# Patient Record
Sex: MALE | Race: WHITE | NOT HISPANIC OR LATINO | URBAN - METROPOLITAN AREA
[De-identification: names, ages, dates, MRNs, and addresses within clinical notes are randomized per-mention and may not be internally consistent; named-entity substitution may affect disease eponyms.]

---

## 2017-03-08 ENCOUNTER — INPATIENT (INPATIENT)
Facility: HOSPITAL | Age: 77
LOS: 11 days | Discharge: NURSING, REHAB OR LONG-TERM CARE | End: 2017-03-20
Attending: SURGERY | Admitting: SURGERY
Payer: MEDICARE

## 2017-03-08 ENCOUNTER — APPOINTMENT (OUTPATIENT)
Dept: VASCULAR SURGERY | Facility: CLINIC | Age: 77
End: 2017-03-08

## 2017-03-08 VITALS
WEIGHT: 226 LBS | BODY MASS INDEX: 29.95 KG/M2 | HEART RATE: 80 BPM | SYSTOLIC BLOOD PRESSURE: 149 MMHG | HEIGHT: 73 IN | DIASTOLIC BLOOD PRESSURE: 72 MMHG | TEMPERATURE: 98.2 F

## 2017-03-08 VITALS
OXYGEN SATURATION: 95 % | HEART RATE: 87 BPM | RESPIRATION RATE: 18 BRPM | DIASTOLIC BLOOD PRESSURE: 64 MMHG | SYSTOLIC BLOOD PRESSURE: 146 MMHG | TEMPERATURE: 98 F

## 2017-03-08 DIAGNOSIS — I96 GANGRENE, NOT ELSEWHERE CLASSIFIED: ICD-10-CM

## 2017-03-08 LAB
ALBUMIN SERPL ELPH-MCNC: 3.9 G/DL — SIGNIFICANT CHANGE UP (ref 3.3–5)
ALP SERPL-CCNC: 81 U/L — SIGNIFICANT CHANGE UP (ref 40–120)
ALT FLD-CCNC: 22 U/L — SIGNIFICANT CHANGE UP (ref 4–41)
AST SERPL-CCNC: 21 U/L — SIGNIFICANT CHANGE UP (ref 4–40)
BASE EXCESS BLDV CALC-SCNC: 1.3 MMOL/L — SIGNIFICANT CHANGE UP
BASOPHILS # BLD AUTO: 0.02 K/UL — SIGNIFICANT CHANGE UP (ref 0–0.2)
BASOPHILS NFR BLD AUTO: 0.4 % — SIGNIFICANT CHANGE UP (ref 0–2)
BILIRUB SERPL-MCNC: 0.5 MG/DL — SIGNIFICANT CHANGE UP (ref 0.2–1.2)
BLD GP AB SCN SERPL QL: NEGATIVE — SIGNIFICANT CHANGE UP
BLOOD GAS VENOUS - CREATININE: 1.63 MG/DL — HIGH (ref 0.5–1.3)
BUN SERPL-MCNC: 26 MG/DL — HIGH (ref 7–23)
CALCIUM SERPL-MCNC: 9.6 MG/DL — SIGNIFICANT CHANGE UP (ref 8.4–10.5)
CHLORIDE BLDV-SCNC: 104 MMOL/L — SIGNIFICANT CHANGE UP (ref 96–108)
CHLORIDE SERPL-SCNC: 100 MMOL/L — SIGNIFICANT CHANGE UP (ref 98–107)
CO2 SERPL-SCNC: 25 MMOL/L — SIGNIFICANT CHANGE UP (ref 22–31)
CREAT SERPL-MCNC: 1.66 MG/DL — HIGH (ref 0.5–1.3)
EOSINOPHIL # BLD AUTO: 0.06 K/UL — SIGNIFICANT CHANGE UP (ref 0–0.5)
EOSINOPHIL NFR BLD AUTO: 1.1 % — SIGNIFICANT CHANGE UP (ref 0–6)
GAS PNL BLDV: 139 MMOL/L — SIGNIFICANT CHANGE UP (ref 136–146)
GLUCOSE BLDV-MCNC: 311 — HIGH (ref 70–99)
GLUCOSE SERPL-MCNC: 312 MG/DL — HIGH (ref 70–99)
HCO3 BLDV-SCNC: 24 MMOL/L — SIGNIFICANT CHANGE UP (ref 20–27)
HCT VFR BLD CALC: 37.1 % — LOW (ref 39–50)
HCT VFR BLDV CALC: 39 % — SIGNIFICANT CHANGE UP (ref 39–51)
HGB BLD-MCNC: 12.5 G/DL — LOW (ref 13–17)
HGB BLDV-MCNC: 12.7 G/DL — LOW (ref 13–17)
IMM GRANULOCYTES NFR BLD AUTO: 0 % — SIGNIFICANT CHANGE UP (ref 0–1.5)
INR BLD: 1.18 — SIGNIFICANT CHANGE UP (ref 0.87–1.18)
LACTATE BLDV-MCNC: 2 MMOL/L — SIGNIFICANT CHANGE UP (ref 0.5–2)
LYMPHOCYTES # BLD AUTO: 1.37 K/UL — SIGNIFICANT CHANGE UP (ref 1–3.3)
LYMPHOCYTES # BLD AUTO: 25.5 % — SIGNIFICANT CHANGE UP (ref 13–44)
MCHC RBC-ENTMCNC: 30.2 PG — SIGNIFICANT CHANGE UP (ref 27–34)
MCHC RBC-ENTMCNC: 33.7 % — SIGNIFICANT CHANGE UP (ref 32–36)
MCV RBC AUTO: 89.6 FL — SIGNIFICANT CHANGE UP (ref 80–100)
MONOCYTES # BLD AUTO: 0.4 K/UL — SIGNIFICANT CHANGE UP (ref 0–0.9)
MONOCYTES NFR BLD AUTO: 7.4 % — SIGNIFICANT CHANGE UP (ref 2–14)
NEUTROPHILS # BLD AUTO: 3.52 K/UL — SIGNIFICANT CHANGE UP (ref 1.8–7.4)
NEUTROPHILS NFR BLD AUTO: 65.6 % — SIGNIFICANT CHANGE UP (ref 43–77)
PCO2 BLDV: 47 MMHG — SIGNIFICANT CHANGE UP (ref 41–51)
PH BLDV: 7.36 PH — SIGNIFICANT CHANGE UP (ref 7.32–7.43)
PLATELET # BLD AUTO: 144 K/UL — LOW (ref 150–400)
PMV BLD: 10.7 FL — SIGNIFICANT CHANGE UP (ref 7–13)
PO2 BLDV: 32 MMHG — LOW (ref 35–40)
POTASSIUM BLDV-SCNC: 4.4 MMOL/L — SIGNIFICANT CHANGE UP (ref 3.4–4.5)
POTASSIUM SERPL-MCNC: 4.7 MMOL/L — SIGNIFICANT CHANGE UP (ref 3.5–5.3)
POTASSIUM SERPL-SCNC: 4.7 MMOL/L — SIGNIFICANT CHANGE UP (ref 3.5–5.3)
PROT SERPL-MCNC: 7.2 G/DL — SIGNIFICANT CHANGE UP (ref 6–8.3)
PROTHROM AB SERPL-ACNC: 13.5 SEC — HIGH (ref 10–13.1)
RBC # BLD: 4.14 M/UL — LOW (ref 4.2–5.8)
RBC # FLD: 13.7 % — SIGNIFICANT CHANGE UP (ref 10.3–14.5)
RH IG SCN BLD-IMP: POSITIVE — SIGNIFICANT CHANGE UP
SAO2 % BLDV: 56.5 % — LOW (ref 60–85)
SODIUM SERPL-SCNC: 140 MMOL/L — SIGNIFICANT CHANGE UP (ref 135–145)
WBC # BLD: 5.37 K/UL — SIGNIFICANT CHANGE UP (ref 3.8–10.5)
WBC # FLD AUTO: 5.37 K/UL — SIGNIFICANT CHANGE UP (ref 3.8–10.5)

## 2017-03-08 RX ORDER — INSULIN DETEMIR 100/ML (3)
30 INSULIN PEN (ML) SUBCUTANEOUS
Qty: 0 | Refills: 0 | Status: DISCONTINUED | OUTPATIENT
Start: 2017-03-08 | End: 2017-03-09

## 2017-03-08 RX ORDER — INSULIN LISPRO 100/ML
VIAL (ML) SUBCUTANEOUS
Qty: 0 | Refills: 0 | Status: DISCONTINUED | OUTPATIENT
Start: 2017-03-08 | End: 2017-03-16

## 2017-03-08 RX ORDER — DEXTROSE 50 % IN WATER 50 %
12.5 SYRINGE (ML) INTRAVENOUS ONCE
Qty: 0 | Refills: 0 | Status: DISCONTINUED | OUTPATIENT
Start: 2017-03-08 | End: 2017-03-16

## 2017-03-08 RX ORDER — HEPARIN SODIUM 5000 [USP'U]/ML
5000 INJECTION INTRAVENOUS; SUBCUTANEOUS EVERY 8 HOURS
Qty: 0 | Refills: 0 | Status: DISCONTINUED | OUTPATIENT
Start: 2017-03-08 | End: 2017-03-16

## 2017-03-08 RX ORDER — GLUCAGON INJECTION, SOLUTION 0.5 MG/.1ML
1 INJECTION, SOLUTION SUBCUTANEOUS ONCE
Qty: 0 | Refills: 0 | Status: DISCONTINUED | OUTPATIENT
Start: 2017-03-08 | End: 2017-03-16

## 2017-03-08 RX ORDER — DEXTROSE 50 % IN WATER 50 %
25 SYRINGE (ML) INTRAVENOUS ONCE
Qty: 0 | Refills: 0 | Status: DISCONTINUED | OUTPATIENT
Start: 2017-03-08 | End: 2017-03-16

## 2017-03-08 RX ORDER — VANCOMYCIN HCL 1 G
1000 VIAL (EA) INTRAVENOUS ONCE
Qty: 0 | Refills: 0 | Status: DISCONTINUED | OUTPATIENT
Start: 2017-03-08 | End: 2017-03-08

## 2017-03-08 RX ORDER — DEXTROSE 50 % IN WATER 50 %
1 SYRINGE (ML) INTRAVENOUS ONCE
Qty: 0 | Refills: 0 | Status: DISCONTINUED | OUTPATIENT
Start: 2017-03-08 | End: 2017-03-16

## 2017-03-08 RX ORDER — ASPIRIN/CALCIUM CARB/MAGNESIUM 324 MG
81 TABLET ORAL DAILY
Qty: 0 | Refills: 0 | Status: DISCONTINUED | OUTPATIENT
Start: 2017-03-08 | End: 2017-03-16

## 2017-03-08 RX ORDER — AMPICILLIN SODIUM AND SULBACTAM SODIUM 250; 125 MG/ML; MG/ML
3 INJECTION, POWDER, FOR SUSPENSION INTRAMUSCULAR; INTRAVENOUS ONCE
Qty: 0 | Refills: 0 | Status: COMPLETED | OUTPATIENT
Start: 2017-03-08 | End: 2017-03-08

## 2017-03-08 RX ORDER — PIPERACILLIN AND TAZOBACTAM 4; .5 G/20ML; G/20ML
3.38 INJECTION, POWDER, LYOPHILIZED, FOR SOLUTION INTRAVENOUS ONCE
Qty: 0 | Refills: 0 | Status: COMPLETED | OUTPATIENT
Start: 2017-03-08 | End: 2017-03-08

## 2017-03-08 RX ORDER — SODIUM CHLORIDE 9 MG/ML
1000 INJECTION INTRAMUSCULAR; INTRAVENOUS; SUBCUTANEOUS ONCE
Qty: 0 | Refills: 0 | Status: COMPLETED | OUTPATIENT
Start: 2017-03-08 | End: 2017-03-08

## 2017-03-08 RX ORDER — ACETAMINOPHEN 500 MG
650 TABLET ORAL EVERY 6 HOURS
Qty: 0 | Refills: 0 | Status: DISCONTINUED | OUTPATIENT
Start: 2017-03-08 | End: 2017-03-16

## 2017-03-08 RX ORDER — INSULIN LISPRO 100/ML
10 VIAL (ML) SUBCUTANEOUS
Qty: 0 | Refills: 0 | Status: DISCONTINUED | OUTPATIENT
Start: 2017-03-08 | End: 2017-03-08

## 2017-03-08 RX ORDER — SODIUM CHLORIDE 9 MG/ML
1000 INJECTION, SOLUTION INTRAVENOUS
Qty: 0 | Refills: 0 | Status: DISCONTINUED | OUTPATIENT
Start: 2017-03-08 | End: 2017-03-16

## 2017-03-08 RX ORDER — INSULIN LISPRO 100/ML
10 VIAL (ML) SUBCUTANEOUS
Qty: 0 | Refills: 0 | Status: DISCONTINUED | OUTPATIENT
Start: 2017-03-08 | End: 2017-03-16

## 2017-03-08 RX ADMIN — AMPICILLIN SODIUM AND SULBACTAM SODIUM 200 GRAM(S): 250; 125 INJECTION, POWDER, FOR SUSPENSION INTRAMUSCULAR; INTRAVENOUS at 19:24

## 2017-03-08 RX ADMIN — SODIUM CHLORIDE 1000 MILLILITER(S): 9 INJECTION INTRAMUSCULAR; INTRAVENOUS; SUBCUTANEOUS at 18:14

## 2017-03-08 RX ADMIN — PIPERACILLIN AND TAZOBACTAM 200 GRAM(S): 4; .5 INJECTION, POWDER, LYOPHILIZED, FOR SOLUTION INTRAVENOUS at 18:14

## 2017-03-08 NOTE — ED PROVIDER NOTE - OBJECTIVE STATEMENT
76 y/o male pmh htn, dm, PVD sent in by vascular Surgeon, Dr. Landeros for admission for gangrene to R 4th toe. Pt admits to having wound on R toe x3 weeks which has progressively worsened. Pt admits to numbness to toe. Pt was treated with PO amoxicillin. Pt denies chest pain, sob, abd pain, n/v/d, dizziness, weakness, fever or chills. denies drainage from wound.

## 2017-03-08 NOTE — ED PROVIDER NOTE - ATTENDING CONTRIBUTION TO CARE
ED Attending Dr. Cook: 78 yo male with AAA, DM, in ED with 2 weeks of worsening black discoloration to right 4th toe.  Sent to ED from podiatry due to need for likely amputation of toe.  On exam right 4th toe with dry gangrene to mid-medial aspect of digit with numbness to palpation.  No CP/SOB, N/V/D or abdominal pain.

## 2017-03-08 NOTE — ED PROVIDER NOTE - PMH
AAA (Abdominal Aortic Aneurysm)  hx of  Diabetes Mellitus    Gallstones    Pleural Effusion    Viral Meningitis

## 2017-03-08 NOTE — H&P ADULT. - ASSESSMENT
77 M former smoker with RLE claudications symptoms with R foot ulcer with necrosis and surrounding cellulitis

## 2017-03-08 NOTE — H&P ADULT. - PSH
Aortocoronary Artery Bypass Graft (ICD9 V45.81)    Cataract  bilateral IOL  History of Aortic Valve Replacement (ICD9 V43.3)    History of Appendectomy    S/P Laparoscopic Cholecystectomy  10/2009  S/P Tonsillectomy

## 2017-03-08 NOTE — H&P ADULT. - HISTORY OF PRESENT ILLNESS
77 male with a PMH of DM, HTN and AVR presents to ED for R foot ulcer. The patient has had some tinglings and numbness in his R foot for the past few weeks. He reports some symptoms of claudication in his R foot and calf. He says that his calf cramps up. At times, he also reports intermittent resting foot pain. No fever or chills. He is functional at home and is able to ambulate without a walker. He is a former smoker (200 pack years), quit 9 years ago.     PSH:  CABG x1 and Aortic Valve Repair and R VATS, Cholecystectomy

## 2017-03-08 NOTE — ED ADULT TRIAGE NOTE - CHIEF COMPLAINT QUOTE
Pt sent by PMD for IV antibiotics, states he has gangrene to right toe. Skin pink, warm to touch. Pt c/o pain to heel of right foot, intermittent. Denies fever. PMH of DM,

## 2017-03-08 NOTE — ED ADULT NURSE NOTE - PSH
Aortocoronary Artery Bypass Graft (ICD9 V45.81)    Cataract  bilateral IOL  Cataract    History of Aortic Valve Replacement (ICD9 V43.3)    History of Appendectomy    S/P Laparoscopic Cholecystectomy  10/2009  S/P Tonsillectomy

## 2017-03-08 NOTE — ED ADULT NURSE NOTE - OBJECTIVE STATEMENT
Receive pt in spot 17 alert and oriented x 3 sent by PCP with gangrene to R Large toe.  Redness, and swelling noted to area.. No drainage or odor noted.  Positive pulses.  IV placed.  Laba sent.  VS see charting Receive pt in spot 17 alert and oriented x 3 sent by PCP with gangrene to R 4 th toe.  Redness, and swelling noted to area.. No drainage or odor noted.  Positive pulses.  IV placed.  Laba sent.  VS see charting

## 2017-03-09 LAB
APTT BLD: 28.7 SEC — SIGNIFICANT CHANGE UP (ref 27.5–37.4)
BUN SERPL-MCNC: 23 MG/DL — SIGNIFICANT CHANGE UP (ref 7–23)
CALCIUM SERPL-MCNC: 9.6 MG/DL — SIGNIFICANT CHANGE UP (ref 8.4–10.5)
CHLORIDE SERPL-SCNC: 108 MMOL/L — HIGH (ref 98–107)
CO2 SERPL-SCNC: 24 MMOL/L — SIGNIFICANT CHANGE UP (ref 22–31)
CREAT SERPL-MCNC: 1.59 MG/DL — HIGH (ref 0.5–1.3)
GLUCOSE SERPL-MCNC: 289 MG/DL — HIGH (ref 70–99)
HBA1C BLD-MCNC: 11 % — HIGH (ref 4–5.6)
HCT VFR BLD CALC: 36.1 % — LOW (ref 39–50)
HGB BLD-MCNC: 12.4 G/DL — LOW (ref 13–17)
INR BLD: 1.2 — HIGH (ref 0.87–1.18)
MCHC RBC-ENTMCNC: 30.9 PG — SIGNIFICANT CHANGE UP (ref 27–34)
MCHC RBC-ENTMCNC: 34.3 % — SIGNIFICANT CHANGE UP (ref 32–36)
MCV RBC AUTO: 90 FL — SIGNIFICANT CHANGE UP (ref 80–100)
PLATELET # BLD AUTO: 140 K/UL — LOW (ref 150–400)
PMV BLD: 10.7 FL — SIGNIFICANT CHANGE UP (ref 7–13)
POTASSIUM SERPL-MCNC: 5.2 MMOL/L — SIGNIFICANT CHANGE UP (ref 3.5–5.3)
POTASSIUM SERPL-SCNC: 5.2 MMOL/L — SIGNIFICANT CHANGE UP (ref 3.5–5.3)
PROTHROM AB SERPL-ACNC: 13.7 SEC — HIGH (ref 10–13.1)
RBC # BLD: 4.01 M/UL — LOW (ref 4.2–5.8)
RBC # FLD: 13.6 % — SIGNIFICANT CHANGE UP (ref 10.3–14.5)
SODIUM SERPL-SCNC: 145 MMOL/L — SIGNIFICANT CHANGE UP (ref 135–145)
SPECIMEN SOURCE: SIGNIFICANT CHANGE UP
SPECIMEN SOURCE: SIGNIFICANT CHANGE UP
WBC # BLD: 5.63 K/UL — SIGNIFICANT CHANGE UP (ref 3.8–10.5)
WBC # FLD AUTO: 5.63 K/UL — SIGNIFICANT CHANGE UP (ref 3.8–10.5)

## 2017-03-09 PROCEDURE — 93306 TTE W/DOPPLER COMPLETE: CPT | Mod: 26

## 2017-03-09 PROCEDURE — 93010 ELECTROCARDIOGRAM REPORT: CPT

## 2017-03-09 PROCEDURE — 99232 SBSQ HOSP IP/OBS MODERATE 35: CPT

## 2017-03-09 PROCEDURE — 71010: CPT | Mod: 26

## 2017-03-09 RX ORDER — ACETYLCYSTEINE 200 MG/ML
1200 VIAL (ML) MISCELLANEOUS
Qty: 0 | Refills: 0 | Status: COMPLETED | OUTPATIENT
Start: 2017-03-09 | End: 2017-03-11

## 2017-03-09 RX ORDER — ASCORBIC ACID 60 MG
500 TABLET,CHEWABLE ORAL DAILY
Qty: 0 | Refills: 0 | Status: DISCONTINUED | OUTPATIENT
Start: 2017-03-09 | End: 2017-03-16

## 2017-03-09 RX ORDER — SODIUM CHLORIDE 9 MG/ML
1000 INJECTION, SOLUTION INTRAVENOUS
Qty: 0 | Refills: 0 | Status: DISCONTINUED | OUTPATIENT
Start: 2017-03-09 | End: 2017-03-09

## 2017-03-09 RX ORDER — AMPICILLIN SODIUM AND SULBACTAM SODIUM 250; 125 MG/ML; MG/ML
1.5 INJECTION, POWDER, FOR SUSPENSION INTRAMUSCULAR; INTRAVENOUS EVERY 6 HOURS
Qty: 0 | Refills: 0 | Status: DISCONTINUED | OUTPATIENT
Start: 2017-03-09 | End: 2017-03-16

## 2017-03-09 RX ORDER — ATORVASTATIN CALCIUM 80 MG/1
40 TABLET, FILM COATED ORAL AT BEDTIME
Qty: 0 | Refills: 0 | Status: COMPLETED | OUTPATIENT
Start: 2017-03-09 | End: 2017-03-09

## 2017-03-09 RX ORDER — SODIUM CHLORIDE 9 MG/ML
1000 INJECTION, SOLUTION INTRAVENOUS
Qty: 0 | Refills: 0 | Status: DISCONTINUED | OUTPATIENT
Start: 2017-03-10 | End: 2017-03-10

## 2017-03-09 RX ORDER — MAGNESIUM SULFATE 500 MG/ML
2 VIAL (ML) INJECTION ONCE
Qty: 0 | Refills: 0 | Status: COMPLETED | OUTPATIENT
Start: 2017-03-09 | End: 2017-03-09

## 2017-03-09 RX ORDER — INSULIN DETEMIR 100/ML (3)
15 INSULIN PEN (ML) SUBCUTANEOUS
Qty: 0 | Refills: 0 | Status: DISCONTINUED | OUTPATIENT
Start: 2017-03-09 | End: 2017-03-16

## 2017-03-09 RX ADMIN — Medication 10 UNIT(S): at 13:03

## 2017-03-09 RX ADMIN — HEPARIN SODIUM 5000 UNIT(S): 5000 INJECTION INTRAVENOUS; SUBCUTANEOUS at 13:03

## 2017-03-09 RX ADMIN — Medication 50 GRAM(S): at 19:55

## 2017-03-09 RX ADMIN — AMPICILLIN SODIUM AND SULBACTAM SODIUM 100 GRAM(S): 250; 125 INJECTION, POWDER, FOR SUSPENSION INTRAMUSCULAR; INTRAVENOUS at 13:03

## 2017-03-09 RX ADMIN — HEPARIN SODIUM 5000 UNIT(S): 5000 INJECTION INTRAVENOUS; SUBCUTANEOUS at 22:22

## 2017-03-09 RX ADMIN — Medication 10 UNIT(S): at 17:16

## 2017-03-09 RX ADMIN — Medication 500 MILLIGRAM(S): at 19:48

## 2017-03-09 RX ADMIN — Medication 1200 MILLIGRAM(S): at 19:47

## 2017-03-09 RX ADMIN — HEPARIN SODIUM 5000 UNIT(S): 5000 INJECTION INTRAVENOUS; SUBCUTANEOUS at 05:13

## 2017-03-09 RX ADMIN — Medication 15 UNIT(S): at 19:55

## 2017-03-09 RX ADMIN — Medication 4: at 07:49

## 2017-03-09 RX ADMIN — Medication 4: at 13:04

## 2017-03-09 RX ADMIN — AMPICILLIN SODIUM AND SULBACTAM SODIUM 100 GRAM(S): 250; 125 INJECTION, POWDER, FOR SUSPENSION INTRAMUSCULAR; INTRAVENOUS at 17:15

## 2017-03-09 RX ADMIN — Medication 30 UNIT(S): at 07:51

## 2017-03-09 RX ADMIN — ATORVASTATIN CALCIUM 40 MILLIGRAM(S): 80 TABLET, FILM COATED ORAL at 22:39

## 2017-03-09 RX ADMIN — Medication 10 UNIT(S): at 07:49

## 2017-03-09 RX ADMIN — AMPICILLIN SODIUM AND SULBACTAM SODIUM 100 GRAM(S): 250; 125 INJECTION, POWDER, FOR SUSPENSION INTRAMUSCULAR; INTRAVENOUS at 06:36

## 2017-03-09 RX ADMIN — Medication 2: at 17:16

## 2017-03-09 RX ADMIN — Medication 81 MILLIGRAM(S): at 13:02

## 2017-03-10 LAB
BLD GP AB SCN SERPL QL: NEGATIVE — SIGNIFICANT CHANGE UP
BUN SERPL-MCNC: 18 MG/DL — SIGNIFICANT CHANGE UP (ref 7–23)
CALCIUM SERPL-MCNC: 9.2 MG/DL — SIGNIFICANT CHANGE UP (ref 8.4–10.5)
CHLORIDE SERPL-SCNC: 104 MMOL/L — SIGNIFICANT CHANGE UP (ref 98–107)
CO2 SERPL-SCNC: 20 MMOL/L — LOW (ref 22–31)
CREAT SERPL-MCNC: 1.37 MG/DL — HIGH (ref 0.5–1.3)
GLUCOSE SERPL-MCNC: 93 MG/DL — SIGNIFICANT CHANGE UP (ref 70–99)
MAGNESIUM SERPL-MCNC: 2.2 MG/DL — SIGNIFICANT CHANGE UP (ref 1.6–2.6)
PHOSPHATE SERPL-MCNC: 3.1 MG/DL — SIGNIFICANT CHANGE UP (ref 2.5–4.5)
POTASSIUM SERPL-MCNC: 4.4 MMOL/L — SIGNIFICANT CHANGE UP (ref 3.5–5.3)
POTASSIUM SERPL-SCNC: 4.4 MMOL/L — SIGNIFICANT CHANGE UP (ref 3.5–5.3)
RH IG SCN BLD-IMP: POSITIVE — SIGNIFICANT CHANGE UP
SODIUM SERPL-SCNC: 143 MMOL/L — SIGNIFICANT CHANGE UP (ref 135–145)

## 2017-03-10 PROCEDURE — 75625 CONTRAST EXAM ABDOMINL AORTA: CPT | Mod: 26

## 2017-03-10 PROCEDURE — 78452 HT MUSCLE IMAGE SPECT MULT: CPT | Mod: 26

## 2017-03-10 PROCEDURE — 36245 INS CATH ABD/L-EXT ART 1ST: CPT

## 2017-03-10 PROCEDURE — 93018 CV STRESS TEST I&R ONLY: CPT | Mod: GC

## 2017-03-10 PROCEDURE — 73660 X-RAY EXAM OF TOE(S): CPT | Mod: 26,RT

## 2017-03-10 PROCEDURE — 75710 ARTERY X-RAYS ARM/LEG: CPT | Mod: 26

## 2017-03-10 PROCEDURE — 93016 CV STRESS TEST SUPVJ ONLY: CPT | Mod: GC

## 2017-03-10 PROCEDURE — 93971 EXTREMITY STUDY: CPT | Mod: 26

## 2017-03-10 RX ORDER — SODIUM CHLORIDE 9 MG/ML
500 INJECTION, SOLUTION INTRAVENOUS
Qty: 0 | Refills: 0 | Status: DISCONTINUED | OUTPATIENT
Start: 2017-03-10 | End: 2017-03-14

## 2017-03-10 RX ORDER — ASCORBIC ACID 60 MG
500 TABLET,CHEWABLE ORAL DAILY
Qty: 0 | Refills: 0 | Status: DISCONTINUED | OUTPATIENT
Start: 2017-03-10 | End: 2017-03-10

## 2017-03-10 RX ORDER — ACETYLCYSTEINE 200 MG/ML
1200 VIAL (ML) MISCELLANEOUS EVERY 12 HOURS
Qty: 0 | Refills: 0 | Status: COMPLETED | OUTPATIENT
Start: 2017-03-12 | End: 2017-03-13

## 2017-03-10 RX ADMIN — Medication 1200 MILLIGRAM(S): at 05:44

## 2017-03-10 RX ADMIN — Medication 15 UNIT(S): at 19:50

## 2017-03-10 RX ADMIN — AMPICILLIN SODIUM AND SULBACTAM SODIUM 100 GRAM(S): 250; 125 INJECTION, POWDER, FOR SUSPENSION INTRAMUSCULAR; INTRAVENOUS at 00:16

## 2017-03-10 RX ADMIN — Medication 1200 MILLIGRAM(S): at 17:52

## 2017-03-10 RX ADMIN — HEPARIN SODIUM 5000 UNIT(S): 5000 INJECTION INTRAVENOUS; SUBCUTANEOUS at 05:43

## 2017-03-10 RX ADMIN — HEPARIN SODIUM 5000 UNIT(S): 5000 INJECTION INTRAVENOUS; SUBCUTANEOUS at 21:40

## 2017-03-10 RX ADMIN — HEPARIN SODIUM 5000 UNIT(S): 5000 INJECTION INTRAVENOUS; SUBCUTANEOUS at 15:02

## 2017-03-10 RX ADMIN — SODIUM CHLORIDE 75 MILLILITER(S): 9 INJECTION, SOLUTION INTRAVENOUS at 00:16

## 2017-03-10 RX ADMIN — AMPICILLIN SODIUM AND SULBACTAM SODIUM 100 GRAM(S): 250; 125 INJECTION, POWDER, FOR SUSPENSION INTRAMUSCULAR; INTRAVENOUS at 17:11

## 2017-03-10 RX ADMIN — Medication 10 UNIT(S): at 17:09

## 2017-03-10 RX ADMIN — AMPICILLIN SODIUM AND SULBACTAM SODIUM 100 GRAM(S): 250; 125 INJECTION, POWDER, FOR SUSPENSION INTRAMUSCULAR; INTRAVENOUS at 05:42

## 2017-03-11 LAB
APTT BLD: 32.2 SEC — SIGNIFICANT CHANGE UP (ref 27.5–37.4)
BUN SERPL-MCNC: 20 MG/DL — SIGNIFICANT CHANGE UP (ref 7–23)
CALCIUM SERPL-MCNC: 9.2 MG/DL — SIGNIFICANT CHANGE UP (ref 8.4–10.5)
CHLORIDE SERPL-SCNC: 104 MMOL/L — SIGNIFICANT CHANGE UP (ref 98–107)
CO2 SERPL-SCNC: 25 MMOL/L — SIGNIFICANT CHANGE UP (ref 22–31)
CREAT SERPL-MCNC: 1.55 MG/DL — HIGH (ref 0.5–1.3)
GLUCOSE SERPL-MCNC: 136 MG/DL — HIGH (ref 70–99)
HCT VFR BLD CALC: 36.1 % — LOW (ref 39–50)
HGB BLD-MCNC: 12.2 G/DL — LOW (ref 13–17)
INR BLD: 1.18 — SIGNIFICANT CHANGE UP (ref 0.87–1.18)
MAGNESIUM SERPL-MCNC: 2.1 MG/DL — SIGNIFICANT CHANGE UP (ref 1.6–2.6)
MCHC RBC-ENTMCNC: 30.4 PG — SIGNIFICANT CHANGE UP (ref 27–34)
MCHC RBC-ENTMCNC: 33.8 % — SIGNIFICANT CHANGE UP (ref 32–36)
MCV RBC AUTO: 90 FL — SIGNIFICANT CHANGE UP (ref 80–100)
PHOSPHATE SERPL-MCNC: 2.7 MG/DL — SIGNIFICANT CHANGE UP (ref 2.5–4.5)
PLATELET # BLD AUTO: 145 K/UL — LOW (ref 150–400)
PMV BLD: 10.7 FL — SIGNIFICANT CHANGE UP (ref 7–13)
POTASSIUM SERPL-MCNC: 4.6 MMOL/L — SIGNIFICANT CHANGE UP (ref 3.5–5.3)
POTASSIUM SERPL-SCNC: 4.6 MMOL/L — SIGNIFICANT CHANGE UP (ref 3.5–5.3)
PROTHROM AB SERPL-ACNC: 13.5 SEC — HIGH (ref 10–13.1)
RBC # BLD: 4.01 M/UL — LOW (ref 4.2–5.8)
RBC # FLD: 13.8 % — SIGNIFICANT CHANGE UP (ref 10.3–14.5)
SODIUM SERPL-SCNC: 143 MMOL/L — SIGNIFICANT CHANGE UP (ref 135–145)
WBC # BLD: 5.13 K/UL — SIGNIFICANT CHANGE UP (ref 3.8–10.5)
WBC # FLD AUTO: 5.13 K/UL — SIGNIFICANT CHANGE UP (ref 3.8–10.5)

## 2017-03-11 RX ORDER — SODIUM CHLORIDE 9 MG/ML
1000 INJECTION INTRAMUSCULAR; INTRAVENOUS; SUBCUTANEOUS
Qty: 0 | Refills: 0 | Status: DISCONTINUED | OUTPATIENT
Start: 2017-03-11 | End: 2017-03-12

## 2017-03-11 RX ADMIN — HEPARIN SODIUM 5000 UNIT(S): 5000 INJECTION INTRAVENOUS; SUBCUTANEOUS at 15:05

## 2017-03-11 RX ADMIN — Medication 81 MILLIGRAM(S): at 12:08

## 2017-03-11 RX ADMIN — AMPICILLIN SODIUM AND SULBACTAM SODIUM 100 GRAM(S): 250; 125 INJECTION, POWDER, FOR SUSPENSION INTRAMUSCULAR; INTRAVENOUS at 18:20

## 2017-03-11 RX ADMIN — SODIUM CHLORIDE 50 MILLILITER(S): 9 INJECTION INTRAMUSCULAR; INTRAVENOUS; SUBCUTANEOUS at 21:33

## 2017-03-11 RX ADMIN — AMPICILLIN SODIUM AND SULBACTAM SODIUM 100 GRAM(S): 250; 125 INJECTION, POWDER, FOR SUSPENSION INTRAMUSCULAR; INTRAVENOUS at 00:01

## 2017-03-11 RX ADMIN — HEPARIN SODIUM 5000 UNIT(S): 5000 INJECTION INTRAVENOUS; SUBCUTANEOUS at 21:33

## 2017-03-11 RX ADMIN — Medication 15 UNIT(S): at 20:29

## 2017-03-11 RX ADMIN — SODIUM CHLORIDE 50 MILLILITER(S): 9 INJECTION INTRAMUSCULAR; INTRAVENOUS; SUBCUTANEOUS at 18:20

## 2017-03-11 RX ADMIN — Medication 10 UNIT(S): at 16:54

## 2017-03-11 RX ADMIN — AMPICILLIN SODIUM AND SULBACTAM SODIUM 100 GRAM(S): 250; 125 INJECTION, POWDER, FOR SUSPENSION INTRAMUSCULAR; INTRAVENOUS at 12:05

## 2017-03-11 RX ADMIN — Medication 15 UNIT(S): at 08:00

## 2017-03-11 RX ADMIN — Medication 1200 MILLIGRAM(S): at 06:32

## 2017-03-11 RX ADMIN — Medication 10 UNIT(S): at 12:06

## 2017-03-11 RX ADMIN — Medication 500 MILLIGRAM(S): at 12:07

## 2017-03-11 RX ADMIN — AMPICILLIN SODIUM AND SULBACTAM SODIUM 100 GRAM(S): 250; 125 INJECTION, POWDER, FOR SUSPENSION INTRAMUSCULAR; INTRAVENOUS at 06:31

## 2017-03-11 RX ADMIN — HEPARIN SODIUM 5000 UNIT(S): 5000 INJECTION INTRAVENOUS; SUBCUTANEOUS at 06:32

## 2017-03-11 RX ADMIN — Medication 10 UNIT(S): at 08:00

## 2017-03-11 NOTE — PROVIDER CONTACT NOTE (OTHER) - ACTION/TREATMENT ORDERED:
Ordered to give Mag 2 gm as ordered for afib.
She came to see pt. Ordered to recheck bp in an hour, will medicate if pt continues to be elevated. Will continue to monitor.
Provider aware. Will come to bedside and change dressing. Will continue to monitor.

## 2017-03-11 NOTE — PROVIDER CONTACT NOTE (OTHER) - ASSESSMENT
No c/o pain. Pt is asymptomatic.
pt stable
Pt in no discomfort and no distress at this time. Tube feeding continued.

## 2017-03-12 LAB
BUN SERPL-MCNC: 22 MG/DL — SIGNIFICANT CHANGE UP (ref 7–23)
CALCIUM SERPL-MCNC: 9.1 MG/DL — SIGNIFICANT CHANGE UP (ref 8.4–10.5)
CHLORIDE SERPL-SCNC: 106 MMOL/L — SIGNIFICANT CHANGE UP (ref 98–107)
CO2 SERPL-SCNC: 24 MMOL/L — SIGNIFICANT CHANGE UP (ref 22–31)
CREAT SERPL-MCNC: 1.55 MG/DL — HIGH (ref 0.5–1.3)
GLUCOSE SERPL-MCNC: 105 MG/DL — HIGH (ref 70–99)
POTASSIUM SERPL-MCNC: 3.9 MMOL/L — SIGNIFICANT CHANGE UP (ref 3.5–5.3)
POTASSIUM SERPL-SCNC: 3.9 MMOL/L — SIGNIFICANT CHANGE UP (ref 3.5–5.3)
SODIUM SERPL-SCNC: 145 MMOL/L — SIGNIFICANT CHANGE UP (ref 135–145)

## 2017-03-12 RX ADMIN — Medication 1200 MILLIGRAM(S): at 09:57

## 2017-03-12 RX ADMIN — AMPICILLIN SODIUM AND SULBACTAM SODIUM 100 GRAM(S): 250; 125 INJECTION, POWDER, FOR SUSPENSION INTRAMUSCULAR; INTRAVENOUS at 00:26

## 2017-03-12 RX ADMIN — AMPICILLIN SODIUM AND SULBACTAM SODIUM 100 GRAM(S): 250; 125 INJECTION, POWDER, FOR SUSPENSION INTRAMUSCULAR; INTRAVENOUS at 23:56

## 2017-03-12 RX ADMIN — Medication 1200 MILLIGRAM(S): at 22:37

## 2017-03-12 RX ADMIN — Medication 15 UNIT(S): at 19:53

## 2017-03-12 RX ADMIN — HEPARIN SODIUM 5000 UNIT(S): 5000 INJECTION INTRAVENOUS; SUBCUTANEOUS at 22:37

## 2017-03-12 RX ADMIN — Medication 10 UNIT(S): at 11:54

## 2017-03-12 RX ADMIN — HEPARIN SODIUM 5000 UNIT(S): 5000 INJECTION INTRAVENOUS; SUBCUTANEOUS at 13:15

## 2017-03-12 RX ADMIN — Medication 81 MILLIGRAM(S): at 11:54

## 2017-03-12 RX ADMIN — Medication 15 UNIT(S): at 07:58

## 2017-03-12 RX ADMIN — Medication 10 UNIT(S): at 17:06

## 2017-03-12 RX ADMIN — Medication 500 MILLIGRAM(S): at 11:54

## 2017-03-12 RX ADMIN — AMPICILLIN SODIUM AND SULBACTAM SODIUM 100 GRAM(S): 250; 125 INJECTION, POWDER, FOR SUSPENSION INTRAMUSCULAR; INTRAVENOUS at 17:06

## 2017-03-12 RX ADMIN — HEPARIN SODIUM 5000 UNIT(S): 5000 INJECTION INTRAVENOUS; SUBCUTANEOUS at 05:48

## 2017-03-12 RX ADMIN — AMPICILLIN SODIUM AND SULBACTAM SODIUM 100 GRAM(S): 250; 125 INJECTION, POWDER, FOR SUSPENSION INTRAMUSCULAR; INTRAVENOUS at 11:54

## 2017-03-12 RX ADMIN — Medication 10 UNIT(S): at 07:57

## 2017-03-12 RX ADMIN — AMPICILLIN SODIUM AND SULBACTAM SODIUM 100 GRAM(S): 250; 125 INJECTION, POWDER, FOR SUSPENSION INTRAMUSCULAR; INTRAVENOUS at 05:48

## 2017-03-13 ENCOUNTER — TRANSCRIPTION ENCOUNTER (OUTPATIENT)
Age: 77
End: 2017-03-13

## 2017-03-13 LAB
BACTERIA BLD CULT: SIGNIFICANT CHANGE UP
BACTERIA BLD CULT: SIGNIFICANT CHANGE UP
BUN SERPL-MCNC: 20 MG/DL — SIGNIFICANT CHANGE UP (ref 7–23)
BUN SERPL-MCNC: 20 MG/DL — SIGNIFICANT CHANGE UP (ref 7–23)
CALCIUM SERPL-MCNC: 9.7 MG/DL — SIGNIFICANT CHANGE UP (ref 8.4–10.5)
CALCIUM SERPL-MCNC: 9.7 MG/DL — SIGNIFICANT CHANGE UP (ref 8.4–10.5)
CHLORIDE SERPL-SCNC: 104 MMOL/L — SIGNIFICANT CHANGE UP (ref 98–107)
CHLORIDE SERPL-SCNC: 104 MMOL/L — SIGNIFICANT CHANGE UP (ref 98–107)
CO2 SERPL-SCNC: 24 MMOL/L — SIGNIFICANT CHANGE UP (ref 22–31)
CO2 SERPL-SCNC: 24 MMOL/L — SIGNIFICANT CHANGE UP (ref 22–31)
CREAT SERPL-MCNC: 1.5 MG/DL — HIGH (ref 0.5–1.3)
CREAT SERPL-MCNC: 1.5 MG/DL — HIGH (ref 0.5–1.3)
GLUCOSE SERPL-MCNC: 147 MG/DL — HIGH (ref 70–99)
GLUCOSE SERPL-MCNC: 147 MG/DL — HIGH (ref 70–99)
MAGNESIUM SERPL-MCNC: 1.8 MG/DL — SIGNIFICANT CHANGE UP (ref 1.6–2.6)
PHOSPHATE SERPL-MCNC: 3.2 MG/DL — SIGNIFICANT CHANGE UP (ref 2.5–4.5)
POTASSIUM SERPL-MCNC: 4.1 MMOL/L — SIGNIFICANT CHANGE UP (ref 3.5–5.3)
POTASSIUM SERPL-MCNC: 4.1 MMOL/L — SIGNIFICANT CHANGE UP (ref 3.5–5.3)
POTASSIUM SERPL-SCNC: 4.1 MMOL/L — SIGNIFICANT CHANGE UP (ref 3.5–5.3)
POTASSIUM SERPL-SCNC: 4.1 MMOL/L — SIGNIFICANT CHANGE UP (ref 3.5–5.3)
SODIUM SERPL-SCNC: 141 MMOL/L — SIGNIFICANT CHANGE UP (ref 135–145)
SODIUM SERPL-SCNC: 141 MMOL/L — SIGNIFICANT CHANGE UP (ref 135–145)

## 2017-03-13 PROCEDURE — 99232 SBSQ HOSP IP/OBS MODERATE 35: CPT

## 2017-03-13 PROCEDURE — 93010 ELECTROCARDIOGRAM REPORT: CPT

## 2017-03-13 RX ADMIN — Medication 15 UNIT(S): at 07:57

## 2017-03-13 RX ADMIN — Medication 81 MILLIGRAM(S): at 12:02

## 2017-03-13 RX ADMIN — Medication 500 MILLIGRAM(S): at 12:02

## 2017-03-13 RX ADMIN — Medication 10 UNIT(S): at 17:21

## 2017-03-13 RX ADMIN — Medication 10 UNIT(S): at 12:03

## 2017-03-13 RX ADMIN — Medication 1200 MILLIGRAM(S): at 21:14

## 2017-03-13 RX ADMIN — AMPICILLIN SODIUM AND SULBACTAM SODIUM 100 GRAM(S): 250; 125 INJECTION, POWDER, FOR SUSPENSION INTRAMUSCULAR; INTRAVENOUS at 06:09

## 2017-03-13 RX ADMIN — Medication 1200 MILLIGRAM(S): at 09:50

## 2017-03-13 RX ADMIN — HEPARIN SODIUM 5000 UNIT(S): 5000 INJECTION INTRAVENOUS; SUBCUTANEOUS at 21:13

## 2017-03-13 RX ADMIN — AMPICILLIN SODIUM AND SULBACTAM SODIUM 100 GRAM(S): 250; 125 INJECTION, POWDER, FOR SUSPENSION INTRAMUSCULAR; INTRAVENOUS at 17:22

## 2017-03-13 RX ADMIN — AMPICILLIN SODIUM AND SULBACTAM SODIUM 100 GRAM(S): 250; 125 INJECTION, POWDER, FOR SUSPENSION INTRAMUSCULAR; INTRAVENOUS at 12:02

## 2017-03-13 RX ADMIN — HEPARIN SODIUM 5000 UNIT(S): 5000 INJECTION INTRAVENOUS; SUBCUTANEOUS at 06:06

## 2017-03-13 RX ADMIN — Medication 2: at 12:03

## 2017-03-13 RX ADMIN — Medication 15 UNIT(S): at 19:40

## 2017-03-13 RX ADMIN — HEPARIN SODIUM 5000 UNIT(S): 5000 INJECTION INTRAVENOUS; SUBCUTANEOUS at 15:00

## 2017-03-13 RX ADMIN — Medication 10 UNIT(S): at 07:54

## 2017-03-13 NOTE — DISCHARGE NOTE ADULT - FINDINGS/TREATMENT
You have undergone a Right lower extremity angiogram on 3/10 and a right femoral-popliteal bypass, Rgith 4th toe amputation on 3/16.

## 2017-03-13 NOTE — DISCHARGE NOTE ADULT - COMMUNITY RESOURCES
[facility] [address] [place] [address] [phone]. . [facility] [place] [phone]

## 2017-03-13 NOTE — DISCHARGE NOTE ADULT - MEDICATION SUMMARY - MEDICATIONS TO TAKE
I will START or STAY ON the medications listed below when I get home from the hospital:    acetaminophen 325 mg oral tablet  -- 2 tab(s) by mouth every 6 hours, As needed, Mild Pain (1 - 3)  -- Indication: For mild pain    oxyCODONE 5 mg oral tablet  -- 1 tab(s) by mouth every 6 hours, As needed, Moderate Pain (4 - 6) to severe pain MDD:4  -- Indication: For moderate to severe pain    Eliquis 5 mg oral tablet  -- 1 tab(s) by mouth 2 times a day  -- Check with your doctor before becoming pregnant.  It is very important that you take or use this exactly as directed.  Do not skip doses or discontinue unless directed by your doctor.  Obtain medical advice before taking any non-prescription drugs as some may affect the action of this medication.    -- Indication: For Antiplatelet post bypass    insulin lispro 100 units/mL subcutaneous solution  -- 10 unit(s) subcutaneous 3 times a day (before meals)  -- Indication: For diabetes    insulin detemir 100 units/mL subcutaneous solution  -- 15 unit(s) subcutaneous 2 times a day  -- Indication: For diabetes    atorvastatin 40 mg oral tablet  -- 1 tab(s) by mouth once a day (at bedtime)  -- Indication: For HLD    Metoprolol Tartrate 25 mg oral tablet  -- 0.5 tab(s) by mouth 2 times a day  -- It is very important that you take or use this exactly as directed.  Do not skip doses or discontinue unless directed by your doctor.  May cause drowsiness.  Alcohol may intensify this effect.  Use care when operating dangerous machinery.  Some non-prescription drugs may aggravate your condition.  Read all labels carefully.  If a warning appears, check with your doctor before taking.  Take with food or milk.  This drug may impair the ability to drive or operate machinery.  Use care until you become familiar with its effects.    -- Indication: For paroxysmal a fib/ HTN    docusate sodium 100 mg oral capsule  -- 1 cap(s) by mouth once a day  -- Indication: For constipation    cilostazol 50 mg oral tablet  -- 1 tab(s) by mouth 2 times a day.  -- Indication: For post bypass antiplatelet    amoxicillin-clavulanate 250 mg-125 mg oral tablet  -- 1 milligram(s) by mouth 2 times a day  -- Finish all this medication unless otherwise directed by prescriber.  Take with food or milk.    -- Indication: For antibiotic    ascorbic acid 500 mg oral tablet  -- 1 tab(s) by mouth once a day  -- Indication: For vitamin I will START or STAY ON the medications listed below when I get home from the hospital:    acetaminophen 325 mg oral tablet  -- 2 tab(s) by mouth every 6 hours, As needed, Mild Pain (1 - 3)  -- Indication: For mild pain    oxyCODONE 5 mg oral tablet  -- 1 tab(s) by mouth every 6 hours, As needed, Moderate Pain (4 - 6) to severe pain  -- Indication: For moderate to severe pain    Eliquis 5 mg oral tablet  -- 1 tab(s) by mouth 2 times a day  -- Indication: For anticoagulation after bypass    insulin lispro 100 units/mL subcutaneous solution  -- 10 unit(s) subcutaneous 3 times a day (before meals)  -- Indication: For diabetes    insulin detemir 100 units/mL subcutaneous solution  -- 15 unit(s) subcutaneous 2 times a day  -- Indication: For diabetes    atorvastatin 40 mg oral tablet  -- 1 tab(s) by mouth once a day (at bedtime)  -- Indication: For hLD    metoprolol  -- 12.5 milligram(s) by mouth 2 times a day  -- Indication: For  paroxysmal a fib/ htn    docusate sodium 100 mg oral capsule  -- 1 cap(s) by mouth once a day  -- Indication: For constipation    cilostazol 50 mg oral tablet  -- 1 tab(s) by mouth 2 times a day  -- Indication: For a fib    Augmentin 250 mg-125 mg oral tablet  -- 1 tab(s) by mouth 2 times a day through 3/25   -- Indication: For antibiotic    ascorbic acid 500 mg oral tablet  -- 1 tab(s) by mouth once a day  -- Indication: For vitamin

## 2017-03-13 NOTE — DISCHARGE NOTE ADULT - HOSPITAL COURSE
77 male with a PMH of DM, HTN and AVR presents to ED for R foot ulcer. The patient has had some tinglings and numbness in his R foot for the past few weeks. He reports some symptoms of claudication in his R foot and calf. He says that his calf cramps up. At times, he also reports intermittent resting foot pain. No fever or chills. He is functional at home and is able to ambulate without a walker. He is a former smoker (200 pack years), quit 9 years ago.     PSH:  CABG x1 and Aortic Valve Repair and R VATS, Cholecystectomy     Pt was admitted to [facility] vascular surgery service.  Pt was started on IV antibiotics for his right 4th toe ulcer and underwent vascular studies. Pt then underwent a Right lower extremity angiogram on 3/10 which revealed multiple stenotic vessels.  Pt was then seen and evaluated by cardiology as pt was recommended to undergo a RLE bypass. He had a TTE and a nuclear stress test which showed    TTE: CONCLUSIONS:  1. Mitral annular calcification, otherwise normal mitral  valve. Minimal mitral regurgitation.  2. Bioprosthetic aortic valve replacement, which was poorly  visualized. Peak transaortic valve gradient equals 31 mm  Hg, mean transaortic valve gradient equals 15 mm Hg, which  is probably normal in the presence of a prosthetic valve.  3. Endocardium not well visualized; grossly normal left  ventricular systolic function.  Endocardial visualization  enhanced with intravenous injection of echo contrast  (Definity).  4. Unable to accurately evaluate right ventricular size or  systolic function.  Stress test: IMPRESSIONS:Abnormal Study  * Myocardial Perfusion SPECT results are abnormal.  * There are small, mild defects in the apex and  anteroseptal wall that are partially reversible suggestive  of infarct with mild jinny-infarct ischemia. The are small,  mild defects in the inferior and inferoseptal walls that  are fixed, suggestive of infarct.  * Post-stress gated wall motion analysis was performed  (LVEF = 55 %;LVEDV = 115 ml.), revealing normal overall LV  function. Paradoxical septal motion is seen consistent  with post-CABG state.      After cardiac workup, pt underwent RLE Fem-Pop PTFE bypass with profundaplasty with R 4th toe amputation on 3/16.  Pt was noted to have EKG changes jinny-operatively, (an episode of rapid A fib controlled with metoprolol and concern for ST changes) and was transferred to a tele monitoring floor post procedure .  As per the cardiologist as pt has known BBB and a history of paroxysmal A Fib.   Troponins were negative x 2 there was no intervention required immediately.  However pt's 3rd troponin was noted to be elevated and  requested pt to start a betablocker, statin and continue ASA.   Pt remained in rate controlled A fib and was recommended to start therapeutic anticoagulation.  Pt refused coumadin but was in agreement to start Eliquis upon discharge.  Pt did not have any further episodes of A fib with RVR and recovered without complication.  His jaquez was removed, pt was voiding and tolerating a regular diet.  Pain was well controlled with oral pain meds.  PT evaluated pt and pt will require home PT upon discharge.  At this time he is stable for discharge with follow up in the office.

## 2017-03-13 NOTE — DISCHARGE NOTE ADULT - CARE PLAN
Principal Discharge DX:	Gangrene  Goal:	Pain control after your bypass  Instructions for follow-up, activity and diet:	-Please leave your staples intact and follow up with  within 1 week of discharge.    Ok to shower and rinse wound with warm soapy water.  Do not scrub wound, pat dry.  Please cover your right 4th toe ulcer with dry gauze daily (OK to wrap with Kerlix or tape to keep intact).  Keep dressing dry. Please call the doctor immed iately if you develop fever, chills, inability to tolerate liquid or food, diarrhea, nausea, vomiting or increased pain at surgical incisions, numbness, tingling and/or redness, bleeding or purulent drainage. Do not drive or operate machinery while taking narcotic pain medication. Please follow up with your surgeon and primary care doctor within 1 week of discharge. Please follow up with cardiology within 1 week of discharge -  as you have been started on Eliquis, Metoprolol and Lipitor for discharge. Principal Discharge DX:	Gangrene  Goal:	Pain control after your bypass  Instructions for follow-up, activity and diet:	-Please leave your staples intact and follow up with  within 1 week of discharge.    Ok to shower and rinse wound with warm soapy water.  Do not scrub wound, pat dry.  Please cover your right 4th toe ulcer with dry gauze daily (OK to wrap with Kerlix or tape to keep intact).  Keep dressing dry. Please call the doctor immed iately if you develop fever, chills, inability to tolerate liquid or food, diarrhea, nausea, vomiting or increased pain at surgical incisions, numbness, tingling and/or redness, bleeding or purulent drainage. Do not drive or operate machinery while taking narcotic pain medication. Please follow up with your surgeon and primary care doctor within 1 week of discharge.  You will be taking pletal and eliquis post operatively/  Secondary Diagnosis:	Atrial fibrillation  Goal:	Follow up with the cardiologist  Instructions for follow-up, activity and diet:	Follow up with Dr. Calvert within 1-2 weeks of discharge.  Continue metoprolol, lipitor and eliquis (the new anticoagulation) you have been started on along with pletal (post bypass antiplatelet).

## 2017-03-13 NOTE — DISCHARGE NOTE ADULT - PATIENT PORTAL LINK FT
“You can access the Follow[facility] Patient Portal, offered by [facility], by registering with the following website: [email or web address]”

## 2017-03-13 NOTE — DISCHARGE NOTE ADULT - CARE PROVIDER_API CALL
Isaias Landeros), Vascular Surgery  [address] [place] [address]  Phone: [phone]  Fax: [phone]    Yan Calvert (MD), Cardiovascular Disease; Internal Medicine  [address] [id number]  Phone: [phone]  Fax: [phone]

## 2017-03-13 NOTE — DISCHARGE NOTE ADULT - CARE PROVIDERS DIRECT ADDRESSES
[email or web address]

## 2017-03-13 NOTE — DISCHARGE NOTE ADULT - INSTRUCTIONS
Resume your usual diabetic diet Resume your usual diabetic diet, Do not take aspirin as you will be starting Eliquis

## 2017-03-13 NOTE — DISCHARGE NOTE ADULT - CONDITIONS AT DISCHARGE
Pt remained hemodynamically stable.  Pt denies any pain or shortness of breath. Right groin/right thigh/ right calf/right foot staples and dressings dry and intact. Pt being discharged to rehab.

## 2017-03-13 NOTE — DISCHARGE NOTE ADULT - PLAN OF CARE
Pain control after your bypass -Please leave your staples intact and follow up with  within 1 week of discharge.    Ok to shower and rinse wound with warm soapy water.  Do not scrub wound, pat dry.  Please cover your right 4th toe ulcer with dry gauze daily (OK to wrap with Kerlix or tape to keep intact).  Keep dressing dry. Please call the doctor immed iately if you develop fever, chills, inability to tolerate liquid or food, diarrhea, nausea, vomiting or increased pain at surgical incisions, numbness, tingling and/or redness, bleeding or purulent drainage. Do not drive or operate machinery while taking narcotic pain medication. Please follow up with your surgeon and primary care doctor within 1 week of discharge. Please follow up with cardiology within 1 week of discharge -  as you have been started on Eliquis, Metoprolol and Lipitor for discharge. Follow up with the cardiologist Follow up with Dr. Calvert within 1-2 weeks of discharge.  Continue metoprolol, lipitor and eliquis (the new anticoagulation) you have been started on along with pletal (post bypass antiplatelet). -Please leave your staples intact and follow up with  within 1 week of discharge.    Ok to shower and rinse wound with warm soapy water.  Do not scrub wound, pat dry.  Please cover your right 4th toe ulcer with dry gauze daily (OK to wrap with Kerlix or tape to keep intact).  Keep dressing dry. Please call the doctor immed iately if you develop fever, chills, inability to tolerate liquid or food, diarrhea, nausea, vomiting or increased pain at surgical incisions, numbness, tingling and/or redness, bleeding or purulent drainage. Do not drive or operate machinery while taking narcotic pain medication. Please follow up with your surgeon and primary care doctor within 1 week of discharge.  You will be taking pletal and eliquis post operatively/

## 2017-03-14 LAB
BUN SERPL-MCNC: 18 MG/DL — SIGNIFICANT CHANGE UP (ref 7–23)
CALCIUM SERPL-MCNC: 9.3 MG/DL — SIGNIFICANT CHANGE UP (ref 8.4–10.5)
CHLORIDE SERPL-SCNC: 104 MMOL/L — SIGNIFICANT CHANGE UP (ref 98–107)
CO2 SERPL-SCNC: 24 MMOL/L — SIGNIFICANT CHANGE UP (ref 22–31)
CREAT SERPL-MCNC: 1.39 MG/DL — HIGH (ref 0.5–1.3)
GLUCOSE SERPL-MCNC: 102 MG/DL — HIGH (ref 70–99)
HCT VFR BLD CALC: 36.4 % — LOW (ref 39–50)
HGB BLD-MCNC: 12.2 G/DL — LOW (ref 13–17)
MAGNESIUM SERPL-MCNC: 1.8 MG/DL — SIGNIFICANT CHANGE UP (ref 1.6–2.6)
MCHC RBC-ENTMCNC: 30.4 PG — SIGNIFICANT CHANGE UP (ref 27–34)
MCHC RBC-ENTMCNC: 33.5 % — SIGNIFICANT CHANGE UP (ref 32–36)
MCV RBC AUTO: 90.8 FL — SIGNIFICANT CHANGE UP (ref 80–100)
PHOSPHATE SERPL-MCNC: 3 MG/DL — SIGNIFICANT CHANGE UP (ref 2.5–4.5)
PLATELET # BLD AUTO: 134 K/UL — LOW (ref 150–400)
PMV BLD: 10.4 FL — SIGNIFICANT CHANGE UP (ref 7–13)
POTASSIUM SERPL-MCNC: 3.9 MMOL/L — SIGNIFICANT CHANGE UP (ref 3.5–5.3)
POTASSIUM SERPL-SCNC: 3.9 MMOL/L — SIGNIFICANT CHANGE UP (ref 3.5–5.3)
RBC # BLD: 4.01 M/UL — LOW (ref 4.2–5.8)
RBC # FLD: 13.9 % — SIGNIFICANT CHANGE UP (ref 10.3–14.5)
SODIUM SERPL-SCNC: 142 MMOL/L — SIGNIFICANT CHANGE UP (ref 135–145)
WBC # BLD: 5.2 K/UL — SIGNIFICANT CHANGE UP (ref 3.8–10.5)
WBC # FLD AUTO: 5.2 K/UL — SIGNIFICANT CHANGE UP (ref 3.8–10.5)

## 2017-03-14 PROCEDURE — 93010 ELECTROCARDIOGRAM REPORT: CPT

## 2017-03-14 PROCEDURE — 99232 SBSQ HOSP IP/OBS MODERATE 35: CPT

## 2017-03-14 RX ORDER — POTASSIUM CHLORIDE 20 MEQ
20 PACKET (EA) ORAL ONCE
Qty: 0 | Refills: 0 | Status: COMPLETED | OUTPATIENT
Start: 2017-03-14 | End: 2017-03-14

## 2017-03-14 RX ORDER — DOCUSATE SODIUM 100 MG
100 CAPSULE ORAL DAILY
Qty: 0 | Refills: 0 | Status: DISCONTINUED | OUTPATIENT
Start: 2017-03-14 | End: 2017-03-16

## 2017-03-14 RX ORDER — MAGNESIUM OXIDE 400 MG ORAL TABLET 241.3 MG
400 TABLET ORAL ONCE
Qty: 0 | Refills: 0 | Status: COMPLETED | OUTPATIENT
Start: 2017-03-14 | End: 2017-03-14

## 2017-03-14 RX ADMIN — Medication 10 UNIT(S): at 11:28

## 2017-03-14 RX ADMIN — HEPARIN SODIUM 5000 UNIT(S): 5000 INJECTION INTRAVENOUS; SUBCUTANEOUS at 06:14

## 2017-03-14 RX ADMIN — AMPICILLIN SODIUM AND SULBACTAM SODIUM 100 GRAM(S): 250; 125 INJECTION, POWDER, FOR SUSPENSION INTRAMUSCULAR; INTRAVENOUS at 16:47

## 2017-03-14 RX ADMIN — Medication 20 MILLIEQUIVALENT(S): at 11:22

## 2017-03-14 RX ADMIN — AMPICILLIN SODIUM AND SULBACTAM SODIUM 100 GRAM(S): 250; 125 INJECTION, POWDER, FOR SUSPENSION INTRAMUSCULAR; INTRAVENOUS at 01:38

## 2017-03-14 RX ADMIN — Medication 500 MILLIGRAM(S): at 11:22

## 2017-03-14 RX ADMIN — MAGNESIUM OXIDE 400 MG ORAL TABLET 400 MILLIGRAM(S): 241.3 TABLET ORAL at 11:22

## 2017-03-14 RX ADMIN — Medication 15 UNIT(S): at 20:12

## 2017-03-14 RX ADMIN — Medication 15 UNIT(S): at 07:53

## 2017-03-14 RX ADMIN — Medication 81 MILLIGRAM(S): at 11:22

## 2017-03-14 RX ADMIN — HEPARIN SODIUM 5000 UNIT(S): 5000 INJECTION INTRAVENOUS; SUBCUTANEOUS at 13:47

## 2017-03-14 RX ADMIN — AMPICILLIN SODIUM AND SULBACTAM SODIUM 100 GRAM(S): 250; 125 INJECTION, POWDER, FOR SUSPENSION INTRAMUSCULAR; INTRAVENOUS at 10:30

## 2017-03-14 RX ADMIN — AMPICILLIN SODIUM AND SULBACTAM SODIUM 100 GRAM(S): 250; 125 INJECTION, POWDER, FOR SUSPENSION INTRAMUSCULAR; INTRAVENOUS at 22:32

## 2017-03-14 RX ADMIN — Medication 2: at 16:46

## 2017-03-14 RX ADMIN — Medication 10 UNIT(S): at 07:53

## 2017-03-14 RX ADMIN — Medication 10 UNIT(S): at 16:47

## 2017-03-14 RX ADMIN — HEPARIN SODIUM 5000 UNIT(S): 5000 INJECTION INTRAVENOUS; SUBCUTANEOUS at 22:32

## 2017-03-15 ENCOUNTER — RESULT REVIEW (OUTPATIENT)
Age: 77
End: 2017-03-15

## 2017-03-15 LAB
APTT BLD: 33.6 SEC — SIGNIFICANT CHANGE UP (ref 27.5–37.4)
BLD GP AB SCN SERPL QL: NEGATIVE — SIGNIFICANT CHANGE UP
BUN SERPL-MCNC: 20 MG/DL — SIGNIFICANT CHANGE UP (ref 7–23)
CALCIUM SERPL-MCNC: 9.2 MG/DL — SIGNIFICANT CHANGE UP (ref 8.4–10.5)
CHLORIDE SERPL-SCNC: 105 MMOL/L — SIGNIFICANT CHANGE UP (ref 98–107)
CO2 SERPL-SCNC: 22 MMOL/L — SIGNIFICANT CHANGE UP (ref 22–31)
CREAT SERPL-MCNC: 1.47 MG/DL — HIGH (ref 0.5–1.3)
GLUCOSE SERPL-MCNC: 127 MG/DL — HIGH (ref 70–99)
HCT VFR BLD CALC: 36.6 % — LOW (ref 39–50)
HGB BLD-MCNC: 12 G/DL — LOW (ref 13–17)
INR BLD: 1.11 — SIGNIFICANT CHANGE UP (ref 0.87–1.18)
MAGNESIUM SERPL-MCNC: 1.8 MG/DL — SIGNIFICANT CHANGE UP (ref 1.6–2.6)
MCHC RBC-ENTMCNC: 29.6 PG — SIGNIFICANT CHANGE UP (ref 27–34)
MCHC RBC-ENTMCNC: 32.8 % — SIGNIFICANT CHANGE UP (ref 32–36)
MCV RBC AUTO: 90.1 FL — SIGNIFICANT CHANGE UP (ref 80–100)
PHOSPHATE SERPL-MCNC: 3 MG/DL — SIGNIFICANT CHANGE UP (ref 2.5–4.5)
PLATELET # BLD AUTO: 150 K/UL — SIGNIFICANT CHANGE UP (ref 150–400)
PMV BLD: 10.7 FL — SIGNIFICANT CHANGE UP (ref 7–13)
POTASSIUM SERPL-MCNC: 4.2 MMOL/L — SIGNIFICANT CHANGE UP (ref 3.5–5.3)
POTASSIUM SERPL-SCNC: 4.2 MMOL/L — SIGNIFICANT CHANGE UP (ref 3.5–5.3)
PROTHROM AB SERPL-ACNC: 12.7 SEC — SIGNIFICANT CHANGE UP (ref 10–13.1)
RBC # BLD: 4.06 M/UL — LOW (ref 4.2–5.8)
RBC # FLD: 13.9 % — SIGNIFICANT CHANGE UP (ref 10.3–14.5)
RH IG SCN BLD-IMP: POSITIVE — SIGNIFICANT CHANGE UP
SODIUM SERPL-SCNC: 142 MMOL/L — SIGNIFICANT CHANGE UP (ref 135–145)
WBC # BLD: 5.44 K/UL — SIGNIFICANT CHANGE UP (ref 3.8–10.5)
WBC # FLD AUTO: 5.44 K/UL — SIGNIFICANT CHANGE UP (ref 3.8–10.5)

## 2017-03-15 PROCEDURE — 71010: CPT | Mod: 26

## 2017-03-15 PROCEDURE — 99232 SBSQ HOSP IP/OBS MODERATE 35: CPT

## 2017-03-15 RX ORDER — MAGNESIUM SULFATE 500 MG/ML
1 VIAL (ML) INJECTION ONCE
Qty: 0 | Refills: 0 | Status: COMPLETED | OUTPATIENT
Start: 2017-03-15 | End: 2017-03-15

## 2017-03-15 RX ORDER — DEXTROSE MONOHYDRATE, SODIUM CHLORIDE, AND POTASSIUM CHLORIDE 50; .745; 4.5 G/1000ML; G/1000ML; G/1000ML
1000 INJECTION, SOLUTION INTRAVENOUS
Qty: 0 | Refills: 0 | Status: DISCONTINUED | OUTPATIENT
Start: 2017-03-15 | End: 2017-03-16

## 2017-03-15 RX ADMIN — Medication 100 MILLIGRAM(S): at 12:07

## 2017-03-15 RX ADMIN — AMPICILLIN SODIUM AND SULBACTAM SODIUM 100 GRAM(S): 250; 125 INJECTION, POWDER, FOR SUSPENSION INTRAMUSCULAR; INTRAVENOUS at 12:06

## 2017-03-15 RX ADMIN — Medication 81 MILLIGRAM(S): at 12:07

## 2017-03-15 RX ADMIN — Medication 10 UNIT(S): at 08:07

## 2017-03-15 RX ADMIN — Medication 10 UNIT(S): at 12:08

## 2017-03-15 RX ADMIN — HEPARIN SODIUM 5000 UNIT(S): 5000 INJECTION INTRAVENOUS; SUBCUTANEOUS at 05:24

## 2017-03-15 RX ADMIN — Medication 10 UNIT(S): at 17:06

## 2017-03-15 RX ADMIN — Medication 100 GRAM(S): at 10:36

## 2017-03-15 RX ADMIN — Medication 500 MILLIGRAM(S): at 12:07

## 2017-03-15 RX ADMIN — Medication 15 UNIT(S): at 08:06

## 2017-03-15 RX ADMIN — Medication 15 UNIT(S): at 19:16

## 2017-03-15 RX ADMIN — AMPICILLIN SODIUM AND SULBACTAM SODIUM 100 GRAM(S): 250; 125 INJECTION, POWDER, FOR SUSPENSION INTRAMUSCULAR; INTRAVENOUS at 17:10

## 2017-03-15 RX ADMIN — AMPICILLIN SODIUM AND SULBACTAM SODIUM 100 GRAM(S): 250; 125 INJECTION, POWDER, FOR SUSPENSION INTRAMUSCULAR; INTRAVENOUS at 05:23

## 2017-03-15 RX ADMIN — HEPARIN SODIUM 5000 UNIT(S): 5000 INJECTION INTRAVENOUS; SUBCUTANEOUS at 21:29

## 2017-03-15 RX ADMIN — HEPARIN SODIUM 5000 UNIT(S): 5000 INJECTION INTRAVENOUS; SUBCUTANEOUS at 13:22

## 2017-03-16 LAB
APTT BLD: 23.7 SEC — LOW (ref 27.5–37.4)
APTT BLD: 28.8 SEC — SIGNIFICANT CHANGE UP (ref 27.5–37.4)
BASE EXCESS BLDA CALC-SCNC: -1.1 MMOL/L — SIGNIFICANT CHANGE UP
BASE EXCESS BLDA CALC-SCNC: 0.9 MMOL/L — SIGNIFICANT CHANGE UP
BUN SERPL-MCNC: 18 MG/DL — SIGNIFICANT CHANGE UP (ref 7–23)
BUN SERPL-MCNC: 22 MG/DL — SIGNIFICANT CHANGE UP (ref 7–23)
CA-I BLDA-SCNC: 1.13 MMOL/L — LOW (ref 1.15–1.29)
CA-I BLDA-SCNC: 1.22 MMOL/L — SIGNIFICANT CHANGE UP (ref 1.15–1.29)
CALCIUM SERPL-MCNC: 8.7 MG/DL — SIGNIFICANT CHANGE UP (ref 8.4–10.5)
CALCIUM SERPL-MCNC: 9.4 MG/DL — SIGNIFICANT CHANGE UP (ref 8.4–10.5)
CHLORIDE SERPL-SCNC: 106 MMOL/L — SIGNIFICANT CHANGE UP (ref 98–107)
CHLORIDE SERPL-SCNC: 107 MMOL/L — SIGNIFICANT CHANGE UP (ref 98–107)
CK MB BLD-MCNC: 1.09 NG/ML — SIGNIFICANT CHANGE UP (ref 1–6.6)
CK SERPL-CCNC: 39 U/L — SIGNIFICANT CHANGE UP (ref 30–200)
CO2 SERPL-SCNC: 22 MMOL/L — SIGNIFICANT CHANGE UP (ref 22–31)
CO2 SERPL-SCNC: 24 MMOL/L — SIGNIFICANT CHANGE UP (ref 22–31)
CREAT SERPL-MCNC: 1.34 MG/DL — HIGH (ref 0.5–1.3)
CREAT SERPL-MCNC: 1.47 MG/DL — HIGH (ref 0.5–1.3)
GLUCOSE BLDA-MCNC: 159 MG/DL — HIGH (ref 70–99)
GLUCOSE BLDA-MCNC: 188 MG/DL — HIGH (ref 70–99)
GLUCOSE SERPL-MCNC: 193 MG/DL — HIGH (ref 70–99)
GLUCOSE SERPL-MCNC: 224 MG/DL — HIGH (ref 70–99)
HCO3 BLDA-SCNC: 24 MMOL/L — SIGNIFICANT CHANGE UP (ref 22–26)
HCO3 BLDA-SCNC: 25 MMOL/L — SIGNIFICANT CHANGE UP (ref 22–26)
HCT VFR BLD CALC: 29.9 % — LOW (ref 39–50)
HCT VFR BLD CALC: 34.8 % — LOW (ref 39–50)
HCT VFR BLDA CALC: 29 % — LOW (ref 39–51)
HCT VFR BLDA CALC: 37.2 % — LOW (ref 39–51)
HGB BLD-MCNC: 11.5 G/DL — LOW (ref 13–17)
HGB BLD-MCNC: 9.9 G/DL — LOW (ref 13–17)
HGB BLDA-MCNC: 12.1 G/DL — LOW (ref 13–17)
HGB BLDA-MCNC: 9.4 G/DL — LOW (ref 13–17)
INR BLD: 1.12 — SIGNIFICANT CHANGE UP (ref 0.87–1.18)
MAGNESIUM SERPL-MCNC: 2 MG/DL — SIGNIFICANT CHANGE UP (ref 1.6–2.6)
MCHC RBC-ENTMCNC: 29.7 PG — SIGNIFICANT CHANGE UP (ref 27–34)
MCHC RBC-ENTMCNC: 29.9 PG — SIGNIFICANT CHANGE UP (ref 27–34)
MCHC RBC-ENTMCNC: 33 % — SIGNIFICANT CHANGE UP (ref 32–36)
MCHC RBC-ENTMCNC: 33.1 % — SIGNIFICANT CHANGE UP (ref 32–36)
MCV RBC AUTO: 89.9 FL — SIGNIFICANT CHANGE UP (ref 80–100)
MCV RBC AUTO: 90.3 FL — SIGNIFICANT CHANGE UP (ref 80–100)
PCO2 BLDA: 38 MMHG — SIGNIFICANT CHANGE UP (ref 35–48)
PCO2 BLDA: 40 MMHG — SIGNIFICANT CHANGE UP (ref 35–48)
PH BLDA: 7.4 PH — SIGNIFICANT CHANGE UP (ref 7.35–7.45)
PH BLDA: 7.41 PH — SIGNIFICANT CHANGE UP (ref 7.35–7.45)
PHOSPHATE SERPL-MCNC: 2.7 MG/DL — SIGNIFICANT CHANGE UP (ref 2.5–4.5)
PLATELET # BLD AUTO: 128 K/UL — LOW (ref 150–400)
PLATELET # BLD AUTO: 150 K/UL — SIGNIFICANT CHANGE UP (ref 150–400)
PMV BLD: 10.2 FL — SIGNIFICANT CHANGE UP (ref 7–13)
PMV BLD: 10.6 FL — SIGNIFICANT CHANGE UP (ref 7–13)
PO2 BLDA: 103 MMHG — SIGNIFICANT CHANGE UP (ref 83–108)
PO2 BLDA: 416 MMHG — HIGH (ref 83–108)
POTASSIUM BLDA-SCNC: 3.9 MMOL/L — SIGNIFICANT CHANGE UP (ref 3.4–4.5)
POTASSIUM BLDA-SCNC: 4.4 MMOL/L — SIGNIFICANT CHANGE UP (ref 3.4–4.5)
POTASSIUM SERPL-MCNC: 4.2 MMOL/L — SIGNIFICANT CHANGE UP (ref 3.5–5.3)
POTASSIUM SERPL-MCNC: 4.3 MMOL/L — SIGNIFICANT CHANGE UP (ref 3.5–5.3)
POTASSIUM SERPL-SCNC: 4.2 MMOL/L — SIGNIFICANT CHANGE UP (ref 3.5–5.3)
POTASSIUM SERPL-SCNC: 4.3 MMOL/L — SIGNIFICANT CHANGE UP (ref 3.5–5.3)
PROTHROM AB SERPL-ACNC: 12.8 SEC — SIGNIFICANT CHANGE UP (ref 10–13.1)
RBC # BLD: 3.31 M/UL — LOW (ref 4.2–5.8)
RBC # BLD: 3.87 M/UL — LOW (ref 4.2–5.8)
RBC # FLD: 14 % — SIGNIFICANT CHANGE UP (ref 10.3–14.5)
RBC # FLD: 14 % — SIGNIFICANT CHANGE UP (ref 10.3–14.5)
RH IG SCN BLD-IMP: POSITIVE — SIGNIFICANT CHANGE UP
SAO2 % BLDA: 100 % — HIGH (ref 95–99)
SAO2 % BLDA: 98.5 % — SIGNIFICANT CHANGE UP (ref 95–99)
SODIUM BLDA-SCNC: 138 MMOL/L — SIGNIFICANT CHANGE UP (ref 136–146)
SODIUM BLDA-SCNC: 139 MMOL/L — SIGNIFICANT CHANGE UP (ref 136–146)
SODIUM SERPL-SCNC: 142 MMOL/L — SIGNIFICANT CHANGE UP (ref 135–145)
SODIUM SERPL-SCNC: 143 MMOL/L — SIGNIFICANT CHANGE UP (ref 135–145)
TROPONIN T SERPL-MCNC: < 0.06 NG/ML — SIGNIFICANT CHANGE UP (ref 0–0.06)
WBC # BLD: 5.61 K/UL — SIGNIFICANT CHANGE UP (ref 3.8–10.5)
WBC # BLD: 7.97 K/UL — SIGNIFICANT CHANGE UP (ref 3.8–10.5)
WBC # FLD AUTO: 5.61 K/UL — SIGNIFICANT CHANGE UP (ref 3.8–10.5)
WBC # FLD AUTO: 7.97 K/UL — SIGNIFICANT CHANGE UP (ref 3.8–10.5)

## 2017-03-16 PROCEDURE — 35656 BPG FEMORAL-POPLITEAL: CPT | Mod: 59,RT

## 2017-03-16 PROCEDURE — 71010: CPT | Mod: 26

## 2017-03-16 PROCEDURE — 28820 AMPUTATION OF TOE: CPT | Mod: T8

## 2017-03-16 PROCEDURE — 93010 ELECTROCARDIOGRAM REPORT: CPT

## 2017-03-16 PROCEDURE — 35141 REPAIR DEFECT OF ARTERY: CPT | Mod: RT

## 2017-03-16 PROCEDURE — 93010 ELECTROCARDIOGRAM REPORT: CPT | Mod: 77

## 2017-03-16 PROCEDURE — 88304 TISSUE EXAM BY PATHOLOGIST: CPT | Mod: 26

## 2017-03-16 PROCEDURE — 88311 DECALCIFY TISSUE: CPT | Mod: 26

## 2017-03-16 RX ORDER — DEXTROSE MONOHYDRATE, SODIUM CHLORIDE, AND POTASSIUM CHLORIDE 50; .745; 4.5 G/1000ML; G/1000ML; G/1000ML
1000 INJECTION, SOLUTION INTRAVENOUS
Qty: 0 | Refills: 0 | Status: DISCONTINUED | OUTPATIENT
Start: 2017-03-16 | End: 2017-03-16

## 2017-03-16 RX ORDER — METOPROLOL TARTRATE 50 MG
5 TABLET ORAL ONCE
Qty: 0 | Refills: 0 | Status: COMPLETED | OUTPATIENT
Start: 2017-03-16 | End: 2017-03-16

## 2017-03-16 RX ORDER — FENTANYL CITRATE 50 UG/ML
25 INJECTION INTRAVENOUS
Qty: 0 | Refills: 0 | Status: DISCONTINUED | OUTPATIENT
Start: 2017-03-16 | End: 2017-03-16

## 2017-03-16 RX ORDER — INSULIN LISPRO 100/ML
VIAL (ML) SUBCUTANEOUS
Qty: 0 | Refills: 0 | Status: DISCONTINUED | OUTPATIENT
Start: 2017-03-16 | End: 2017-03-20

## 2017-03-16 RX ORDER — MAGNESIUM SULFATE 500 MG/ML
2 VIAL (ML) INJECTION ONCE
Qty: 0 | Refills: 0 | Status: COMPLETED | OUTPATIENT
Start: 2017-03-16 | End: 2017-03-16

## 2017-03-16 RX ORDER — ASCORBIC ACID 60 MG
500 TABLET,CHEWABLE ORAL DAILY
Qty: 0 | Refills: 0 | Status: DISCONTINUED | OUTPATIENT
Start: 2017-03-16 | End: 2017-03-20

## 2017-03-16 RX ORDER — GLUCAGON INJECTION, SOLUTION 0.5 MG/.1ML
1 INJECTION, SOLUTION SUBCUTANEOUS ONCE
Qty: 0 | Refills: 0 | Status: DISCONTINUED | OUTPATIENT
Start: 2017-03-16 | End: 2017-03-20

## 2017-03-16 RX ORDER — DEXTROSE 50 % IN WATER 50 %
1 SYRINGE (ML) INTRAVENOUS ONCE
Qty: 0 | Refills: 0 | Status: DISCONTINUED | OUTPATIENT
Start: 2017-03-16 | End: 2017-03-20

## 2017-03-16 RX ORDER — MORPHINE SULFATE 50 MG/1
2 CAPSULE, EXTENDED RELEASE ORAL EVERY 4 HOURS
Qty: 0 | Refills: 0 | Status: DISCONTINUED | OUTPATIENT
Start: 2017-03-16 | End: 2017-03-20

## 2017-03-16 RX ORDER — DOCUSATE SODIUM 100 MG
100 CAPSULE ORAL DAILY
Qty: 0 | Refills: 0 | Status: DISCONTINUED | OUTPATIENT
Start: 2017-03-16 | End: 2017-03-20

## 2017-03-16 RX ORDER — DEXTROSE 50 % IN WATER 50 %
12.5 SYRINGE (ML) INTRAVENOUS ONCE
Qty: 0 | Refills: 0 | Status: DISCONTINUED | OUTPATIENT
Start: 2017-03-16 | End: 2017-03-20

## 2017-03-16 RX ORDER — FENTANYL CITRATE 50 UG/ML
50 INJECTION INTRAVENOUS
Qty: 0 | Refills: 0 | Status: DISCONTINUED | OUTPATIENT
Start: 2017-03-16 | End: 2017-03-16

## 2017-03-16 RX ORDER — ONDANSETRON 8 MG/1
4 TABLET, FILM COATED ORAL ONCE
Qty: 0 | Refills: 0 | Status: COMPLETED | OUTPATIENT
Start: 2017-03-16 | End: 2017-03-16

## 2017-03-16 RX ORDER — INSULIN DETEMIR 100/ML (3)
15 INSULIN PEN (ML) SUBCUTANEOUS
Qty: 0 | Refills: 0 | Status: DISCONTINUED | OUTPATIENT
Start: 2017-03-16 | End: 2017-03-20

## 2017-03-16 RX ORDER — ACETAMINOPHEN 500 MG
650 TABLET ORAL EVERY 6 HOURS
Qty: 0 | Refills: 0 | Status: DISCONTINUED | OUTPATIENT
Start: 2017-03-16 | End: 2017-03-20

## 2017-03-16 RX ORDER — AMPICILLIN SODIUM AND SULBACTAM SODIUM 250; 125 MG/ML; MG/ML
1.5 INJECTION, POWDER, FOR SUSPENSION INTRAMUSCULAR; INTRAVENOUS EVERY 6 HOURS
Qty: 0 | Refills: 0 | Status: DISCONTINUED | OUTPATIENT
Start: 2017-03-16 | End: 2017-03-16

## 2017-03-16 RX ORDER — AMPICILLIN SODIUM AND SULBACTAM SODIUM 250; 125 MG/ML; MG/ML
1.5 INJECTION, POWDER, FOR SUSPENSION INTRAMUSCULAR; INTRAVENOUS EVERY 12 HOURS
Qty: 0 | Refills: 0 | Status: DISCONTINUED | OUTPATIENT
Start: 2017-03-16 | End: 2017-03-20

## 2017-03-16 RX ORDER — HEPARIN SODIUM 5000 [USP'U]/ML
5000 INJECTION INTRAVENOUS; SUBCUTANEOUS EVERY 8 HOURS
Qty: 0 | Refills: 0 | Status: DISCONTINUED | OUTPATIENT
Start: 2017-03-16 | End: 2017-03-20

## 2017-03-16 RX ORDER — SODIUM CHLORIDE 9 MG/ML
1000 INJECTION, SOLUTION INTRAVENOUS
Qty: 0 | Refills: 0 | Status: DISCONTINUED | OUTPATIENT
Start: 2017-03-16 | End: 2017-03-20

## 2017-03-16 RX ORDER — LABETALOL HCL 100 MG
10 TABLET ORAL ONCE
Qty: 0 | Refills: 0 | Status: DISCONTINUED | OUTPATIENT
Start: 2017-03-16 | End: 2017-03-16

## 2017-03-16 RX ORDER — INSULIN LISPRO 100/ML
10 VIAL (ML) SUBCUTANEOUS
Qty: 0 | Refills: 0 | Status: DISCONTINUED | OUTPATIENT
Start: 2017-03-16 | End: 2017-03-20

## 2017-03-16 RX ADMIN — Medication 6: at 17:05

## 2017-03-16 RX ADMIN — ONDANSETRON 4 MILLIGRAM(S): 8 TABLET, FILM COATED ORAL at 13:00

## 2017-03-16 RX ADMIN — Medication 200 MILLIGRAM(S): at 13:39

## 2017-03-16 RX ADMIN — HEPARIN SODIUM 5000 UNIT(S): 5000 INJECTION INTRAVENOUS; SUBCUTANEOUS at 23:39

## 2017-03-16 RX ADMIN — Medication 15 UNIT(S): at 23:39

## 2017-03-16 RX ADMIN — HEPARIN SODIUM 5000 UNIT(S): 5000 INJECTION INTRAVENOUS; SUBCUTANEOUS at 05:04

## 2017-03-16 RX ADMIN — Medication 5 MILLIGRAM(S): at 16:30

## 2017-03-16 RX ADMIN — Medication 50 GRAM(S): at 19:53

## 2017-03-16 RX ADMIN — Medication 5 MILLIGRAM(S): at 19:53

## 2017-03-16 RX ADMIN — AMPICILLIN SODIUM AND SULBACTAM SODIUM 100 GRAM(S): 250; 125 INJECTION, POWDER, FOR SUSPENSION INTRAMUSCULAR; INTRAVENOUS at 05:04

## 2017-03-16 RX ADMIN — DEXTROSE MONOHYDRATE, SODIUM CHLORIDE, AND POTASSIUM CHLORIDE 75 MILLILITER(S): 50; .745; 4.5 INJECTION, SOLUTION INTRAVENOUS at 00:02

## 2017-03-16 RX ADMIN — HEPARIN SODIUM 5000 UNIT(S): 5000 INJECTION INTRAVENOUS; SUBCUTANEOUS at 14:19

## 2017-03-16 RX ADMIN — AMPICILLIN SODIUM AND SULBACTAM SODIUM 100 GRAM(S): 250; 125 INJECTION, POWDER, FOR SUSPENSION INTRAMUSCULAR; INTRAVENOUS at 00:02

## 2017-03-16 RX ADMIN — AMPICILLIN SODIUM AND SULBACTAM SODIUM 100 GRAM(S): 250; 125 INJECTION, POWDER, FOR SUSPENSION INTRAMUSCULAR; INTRAVENOUS at 17:11

## 2017-03-17 ENCOUNTER — TRANSCRIPTION ENCOUNTER (OUTPATIENT)
Age: 77
End: 2017-03-17

## 2017-03-17 LAB
APTT BLD: 31.2 SEC — SIGNIFICANT CHANGE UP (ref 27.5–37.4)
BUN SERPL-MCNC: 21 MG/DL — SIGNIFICANT CHANGE UP (ref 7–23)
CALCIUM SERPL-MCNC: 8.7 MG/DL — SIGNIFICANT CHANGE UP (ref 8.4–10.5)
CHLORIDE SERPL-SCNC: 102 MMOL/L — SIGNIFICANT CHANGE UP (ref 98–107)
CK SERPL-CCNC: 131 U/L — SIGNIFICANT CHANGE UP (ref 30–200)
CK SERPL-CCNC: 156 U/L — SIGNIFICANT CHANGE UP (ref 30–200)
CK SERPL-CCNC: 66 U/L — SIGNIFICANT CHANGE UP (ref 30–200)
CO2 SERPL-SCNC: 23 MMOL/L — SIGNIFICANT CHANGE UP (ref 22–31)
CREAT SERPL-MCNC: 1.67 MG/DL — HIGH (ref 0.5–1.3)
GLUCOSE SERPL-MCNC: 272 MG/DL — HIGH (ref 70–99)
HCT VFR BLD CALC: 30.7 % — LOW (ref 39–50)
HGB BLD-MCNC: 10.2 G/DL — LOW (ref 13–17)
INR BLD: 1.34 — HIGH (ref 0.87–1.18)
MAGNESIUM SERPL-MCNC: 2.2 MG/DL — SIGNIFICANT CHANGE UP (ref 1.6–2.6)
MCHC RBC-ENTMCNC: 30.9 PG — SIGNIFICANT CHANGE UP (ref 27–34)
MCHC RBC-ENTMCNC: 33.2 % — SIGNIFICANT CHANGE UP (ref 32–36)
MCV RBC AUTO: 93 FL — SIGNIFICANT CHANGE UP (ref 80–100)
PHOSPHATE SERPL-MCNC: 3.1 MG/DL — SIGNIFICANT CHANGE UP (ref 2.5–4.5)
PLATELET # BLD AUTO: 143 K/UL — LOW (ref 150–400)
PMV BLD: 10.6 FL — SIGNIFICANT CHANGE UP (ref 7–13)
POTASSIUM SERPL-MCNC: 5.2 MMOL/L — SIGNIFICANT CHANGE UP (ref 3.5–5.3)
POTASSIUM SERPL-SCNC: 5.2 MMOL/L — SIGNIFICANT CHANGE UP (ref 3.5–5.3)
PROTHROM AB SERPL-ACNC: 15.3 SEC — HIGH (ref 10–13.1)
RBC # BLD: 3.3 M/UL — LOW (ref 4.2–5.8)
RBC # FLD: 14.5 % — SIGNIFICANT CHANGE UP (ref 10.3–14.5)
SODIUM SERPL-SCNC: 140 MMOL/L — SIGNIFICANT CHANGE UP (ref 135–145)
TROPONIN T SERPL-MCNC: 0.1 NG/ML — HIGH (ref 0–0.06)
TROPONIN T SERPL-MCNC: 0.13 NG/ML — HIGH (ref 0–0.06)
TROPONIN T SERPL-MCNC: < 0.06 NG/ML — SIGNIFICANT CHANGE UP (ref 0–0.06)
WBC # BLD: 7.78 K/UL — SIGNIFICANT CHANGE UP (ref 3.8–10.5)
WBC # FLD AUTO: 7.78 K/UL — SIGNIFICANT CHANGE UP (ref 3.8–10.5)

## 2017-03-17 PROCEDURE — 93010 ELECTROCARDIOGRAM REPORT: CPT

## 2017-03-17 RX ORDER — METOPROLOL TARTRATE 50 MG
12.5 TABLET ORAL ONCE
Qty: 0 | Refills: 0 | Status: COMPLETED | OUTPATIENT
Start: 2017-03-17 | End: 2017-03-17

## 2017-03-17 RX ORDER — METOPROLOL TARTRATE 50 MG
25 TABLET ORAL
Qty: 0 | Refills: 0 | Status: DISCONTINUED | OUTPATIENT
Start: 2017-03-17 | End: 2017-03-17

## 2017-03-17 RX ORDER — ASPIRIN/CALCIUM CARB/MAGNESIUM 324 MG
81 TABLET ORAL DAILY
Qty: 0 | Refills: 0 | Status: DISCONTINUED | OUTPATIENT
Start: 2017-03-17 | End: 2017-03-20

## 2017-03-17 RX ORDER — METOPROLOL TARTRATE 50 MG
12.5 TABLET ORAL
Qty: 0 | Refills: 0 | Status: DISCONTINUED | OUTPATIENT
Start: 2017-03-17 | End: 2017-03-20

## 2017-03-17 RX ORDER — METOPROLOL TARTRATE 50 MG
25 TABLET ORAL ONCE
Qty: 0 | Refills: 0 | Status: DISCONTINUED | OUTPATIENT
Start: 2017-03-17 | End: 2017-03-17

## 2017-03-17 RX ORDER — ATORVASTATIN CALCIUM 80 MG/1
40 TABLET, FILM COATED ORAL AT BEDTIME
Qty: 0 | Refills: 0 | Status: DISCONTINUED | OUTPATIENT
Start: 2017-03-17 | End: 2017-03-20

## 2017-03-17 RX ORDER — CILOSTAZOL 100 MG/1
50 TABLET ORAL
Qty: 0 | Refills: 0 | Status: DISCONTINUED | OUTPATIENT
Start: 2017-03-17 | End: 2017-03-20

## 2017-03-17 RX ADMIN — HEPARIN SODIUM 5000 UNIT(S): 5000 INJECTION INTRAVENOUS; SUBCUTANEOUS at 06:11

## 2017-03-17 RX ADMIN — Medication 2: at 12:32

## 2017-03-17 RX ADMIN — Medication 10 UNIT(S): at 17:16

## 2017-03-17 RX ADMIN — AMPICILLIN SODIUM AND SULBACTAM SODIUM 100 GRAM(S): 250; 125 INJECTION, POWDER, FOR SUSPENSION INTRAMUSCULAR; INTRAVENOUS at 06:11

## 2017-03-17 RX ADMIN — HEPARIN SODIUM 5000 UNIT(S): 5000 INJECTION INTRAVENOUS; SUBCUTANEOUS at 13:36

## 2017-03-17 RX ADMIN — Medication 500 MILLIGRAM(S): at 11:36

## 2017-03-17 RX ADMIN — Medication 650 MILLIGRAM(S): at 06:11

## 2017-03-17 RX ADMIN — CILOSTAZOL 50 MILLIGRAM(S): 100 TABLET ORAL at 18:14

## 2017-03-17 RX ADMIN — Medication 81 MILLIGRAM(S): at 11:36

## 2017-03-17 RX ADMIN — Medication 15 UNIT(S): at 21:40

## 2017-03-17 RX ADMIN — Medication 4: at 09:22

## 2017-03-17 RX ADMIN — Medication 12.5 MILLIGRAM(S): at 16:29

## 2017-03-17 RX ADMIN — AMPICILLIN SODIUM AND SULBACTAM SODIUM 100 GRAM(S): 250; 125 INJECTION, POWDER, FOR SUSPENSION INTRAMUSCULAR; INTRAVENOUS at 18:14

## 2017-03-17 RX ADMIN — Medication 10 UNIT(S): at 12:33

## 2017-03-17 RX ADMIN — Medication 2: at 17:15

## 2017-03-17 RX ADMIN — Medication 650 MILLIGRAM(S): at 09:21

## 2017-03-17 RX ADMIN — ATORVASTATIN CALCIUM 40 MILLIGRAM(S): 80 TABLET, FILM COATED ORAL at 21:38

## 2017-03-17 RX ADMIN — Medication 15 UNIT(S): at 09:23

## 2017-03-17 RX ADMIN — Medication 10 UNIT(S): at 09:22

## 2017-03-17 RX ADMIN — HEPARIN SODIUM 5000 UNIT(S): 5000 INJECTION INTRAVENOUS; SUBCUTANEOUS at 21:38

## 2017-03-17 RX ADMIN — Medication 100 MILLIGRAM(S): at 11:36

## 2017-03-17 RX ADMIN — Medication 12.5 MILLIGRAM(S): at 18:58

## 2017-03-17 NOTE — DIETITIAN INITIAL EVALUATION ADULT. - NS AS NUTRI INTERV MEALS SNACK
Diets modified for specific foods and ingredients/1. Suggest: PO diet rx: Consistent Carbohydrate (evening snack), DASH/TLC (cholesterol and sodium restricted);                2. Monitor PO diet tolerance;             3. Monitor labs, weights, hydration status;            4. Suggest outpatient follow up with an endocrinologist to ensure long-term DM diet comprehension and compliance. Suggest outpatient follow up with appropriate RD for the purposes of long-term nutrition evaluation and diet education;/Other (specify)

## 2017-03-17 NOTE — DIETITIAN INITIAL EVALUATION ADULT. - OTHER INFO
Pt seen for length of stay. Pt 78 yo male appears alert, oriented. Per Pt his appetite good; no chew/swallow problem; no nausea/vomiting/diarrhea @ present. Pt's HbA1c level 11.0% (3/9) noted. Consistent Carbohydrate diet discussed with Pt; written materials on diet also provided. Pt verbalized understanding. RDN remains available.

## 2017-03-18 RX ADMIN — AMPICILLIN SODIUM AND SULBACTAM SODIUM 100 GRAM(S): 250; 125 INJECTION, POWDER, FOR SUSPENSION INTRAMUSCULAR; INTRAVENOUS at 17:45

## 2017-03-18 RX ADMIN — Medication 500 MILLIGRAM(S): at 12:12

## 2017-03-18 RX ADMIN — Medication 2: at 12:12

## 2017-03-18 RX ADMIN — Medication 15 UNIT(S): at 21:32

## 2017-03-18 RX ADMIN — Medication 10 UNIT(S): at 17:44

## 2017-03-18 RX ADMIN — Medication 650 MILLIGRAM(S): at 11:10

## 2017-03-18 RX ADMIN — HEPARIN SODIUM 5000 UNIT(S): 5000 INJECTION INTRAVENOUS; SUBCUTANEOUS at 05:12

## 2017-03-18 RX ADMIN — Medication 15 UNIT(S): at 08:43

## 2017-03-18 RX ADMIN — CILOSTAZOL 50 MILLIGRAM(S): 100 TABLET ORAL at 05:11

## 2017-03-18 RX ADMIN — HEPARIN SODIUM 5000 UNIT(S): 5000 INJECTION INTRAVENOUS; SUBCUTANEOUS at 21:32

## 2017-03-18 RX ADMIN — HEPARIN SODIUM 5000 UNIT(S): 5000 INJECTION INTRAVENOUS; SUBCUTANEOUS at 15:09

## 2017-03-18 RX ADMIN — CILOSTAZOL 50 MILLIGRAM(S): 100 TABLET ORAL at 17:45

## 2017-03-18 RX ADMIN — Medication 12.5 MILLIGRAM(S): at 05:11

## 2017-03-18 RX ADMIN — Medication 100 MILLIGRAM(S): at 12:12

## 2017-03-18 RX ADMIN — Medication 81 MILLIGRAM(S): at 12:12

## 2017-03-18 RX ADMIN — Medication 10 UNIT(S): at 12:12

## 2017-03-18 RX ADMIN — Medication 10 UNIT(S): at 08:43

## 2017-03-18 RX ADMIN — AMPICILLIN SODIUM AND SULBACTAM SODIUM 100 GRAM(S): 250; 125 INJECTION, POWDER, FOR SUSPENSION INTRAMUSCULAR; INTRAVENOUS at 05:12

## 2017-03-18 RX ADMIN — Medication 650 MILLIGRAM(S): at 10:18

## 2017-03-18 RX ADMIN — Medication 12.5 MILLIGRAM(S): at 17:45

## 2017-03-18 RX ADMIN — ATORVASTATIN CALCIUM 40 MILLIGRAM(S): 80 TABLET, FILM COATED ORAL at 21:32

## 2017-03-18 NOTE — PHYSICAL THERAPY INITIAL EVALUATION ADULT - ADDITIONAL COMMENTS
Pt reports that he lives on top floor of 2 family house, many steps to enter (at least 1 flight), ambulated independently without assistive devices prior to admission.

## 2017-03-18 NOTE — PHYSICAL THERAPY INITIAL EVALUATION ADULT - PERTINENT HX OF CURRENT PROBLEM, REHAB EVAL
77 male with a PMH of DM, HTN and AVR presented to ED for R foot ulcer. Now status post right lower extremity bypass and toe amputation.

## 2017-03-19 RX ADMIN — CILOSTAZOL 50 MILLIGRAM(S): 100 TABLET ORAL at 17:04

## 2017-03-19 RX ADMIN — Medication 81 MILLIGRAM(S): at 12:40

## 2017-03-19 RX ADMIN — ATORVASTATIN CALCIUM 40 MILLIGRAM(S): 80 TABLET, FILM COATED ORAL at 22:10

## 2017-03-19 RX ADMIN — AMPICILLIN SODIUM AND SULBACTAM SODIUM 100 GRAM(S): 250; 125 INJECTION, POWDER, FOR SUSPENSION INTRAMUSCULAR; INTRAVENOUS at 05:10

## 2017-03-19 RX ADMIN — HEPARIN SODIUM 5000 UNIT(S): 5000 INJECTION INTRAVENOUS; SUBCUTANEOUS at 22:10

## 2017-03-19 RX ADMIN — Medication 10 UNIT(S): at 17:10

## 2017-03-19 RX ADMIN — Medication 10 UNIT(S): at 12:42

## 2017-03-19 RX ADMIN — Medication 12.5 MILLIGRAM(S): at 18:25

## 2017-03-19 RX ADMIN — Medication 500 MILLIGRAM(S): at 12:40

## 2017-03-19 RX ADMIN — Medication 10 UNIT(S): at 08:17

## 2017-03-19 RX ADMIN — Medication 12.5 MILLIGRAM(S): at 05:10

## 2017-03-19 RX ADMIN — CILOSTAZOL 50 MILLIGRAM(S): 100 TABLET ORAL at 05:10

## 2017-03-19 RX ADMIN — Medication 15 UNIT(S): at 08:16

## 2017-03-19 RX ADMIN — AMPICILLIN SODIUM AND SULBACTAM SODIUM 100 GRAM(S): 250; 125 INJECTION, POWDER, FOR SUSPENSION INTRAMUSCULAR; INTRAVENOUS at 17:03

## 2017-03-19 RX ADMIN — Medication 100 MILLIGRAM(S): at 12:40

## 2017-03-19 RX ADMIN — HEPARIN SODIUM 5000 UNIT(S): 5000 INJECTION INTRAVENOUS; SUBCUTANEOUS at 05:10

## 2017-03-19 RX ADMIN — Medication 4: at 17:09

## 2017-03-19 RX ADMIN — Medication 2: at 12:42

## 2017-03-19 RX ADMIN — HEPARIN SODIUM 5000 UNIT(S): 5000 INJECTION INTRAVENOUS; SUBCUTANEOUS at 15:35

## 2017-03-19 RX ADMIN — Medication 15 UNIT(S): at 22:09

## 2017-03-20 VITALS
OXYGEN SATURATION: 98 % | RESPIRATION RATE: 18 BRPM | SYSTOLIC BLOOD PRESSURE: 137 MMHG | TEMPERATURE: 99 F | DIASTOLIC BLOOD PRESSURE: 48 MMHG | HEART RATE: 82 BPM

## 2017-03-20 LAB
BUN SERPL-MCNC: 26 MG/DL — HIGH (ref 7–23)
CALCIUM SERPL-MCNC: 9.3 MG/DL — SIGNIFICANT CHANGE UP (ref 8.4–10.5)
CHLORIDE SERPL-SCNC: 104 MMOL/L — SIGNIFICANT CHANGE UP (ref 98–107)
CO2 SERPL-SCNC: 23 MMOL/L — SIGNIFICANT CHANGE UP (ref 22–31)
CREAT SERPL-MCNC: 1.51 MG/DL — HIGH (ref 0.5–1.3)
GLUCOSE SERPL-MCNC: 124 MG/DL — HIGH (ref 70–99)
POTASSIUM SERPL-MCNC: 3.8 MMOL/L — SIGNIFICANT CHANGE UP (ref 3.5–5.3)
POTASSIUM SERPL-SCNC: 3.8 MMOL/L — SIGNIFICANT CHANGE UP (ref 3.5–5.3)
SODIUM SERPL-SCNC: 142 MMOL/L — SIGNIFICANT CHANGE UP (ref 135–145)

## 2017-03-20 RX ORDER — OXYCODONE HYDROCHLORIDE 5 MG/1
1 TABLET ORAL
Qty: 16 | Refills: 0 | OUTPATIENT
Start: 2017-03-20

## 2017-03-20 RX ORDER — CILOSTAZOL 100 MG/1
1 TABLET ORAL
Qty: 0 | Refills: 0 | COMMUNITY
Start: 2017-03-20

## 2017-03-20 RX ORDER — INSULIN LISPRO 100/ML
10 VIAL (ML) SUBCUTANEOUS
Qty: 0 | Refills: 0 | COMMUNITY
Start: 2017-03-20

## 2017-03-20 RX ORDER — ATORVASTATIN CALCIUM 80 MG/1
1 TABLET, FILM COATED ORAL
Qty: 14 | Refills: 0 | OUTPATIENT
Start: 2017-03-20 | End: 2017-04-03

## 2017-03-20 RX ORDER — DOCUSATE SODIUM 100 MG
1 CAPSULE ORAL
Qty: 0 | Refills: 0 | COMMUNITY
Start: 2017-03-20

## 2017-03-20 RX ORDER — INSULIN DETEMIR 100/ML (3)
15 INSULIN PEN (ML) SUBCUTANEOUS
Qty: 0 | Refills: 0 | COMMUNITY
Start: 2017-03-20

## 2017-03-20 RX ORDER — CILOSTAZOL 100 MG/1
1 TABLET ORAL
Qty: 28 | Refills: 0 | OUTPATIENT
Start: 2017-03-20 | End: 2017-04-03

## 2017-03-20 RX ORDER — APIXABAN 2.5 MG/1
1 TABLET, FILM COATED ORAL
Qty: 28 | Refills: 0 | OUTPATIENT
Start: 2017-03-20 | End: 2017-04-03

## 2017-03-20 RX ORDER — OXYCODONE HYDROCHLORIDE 5 MG/1
5 TABLET ORAL EVERY 6 HOURS
Qty: 0 | Refills: 0 | Status: DISCONTINUED | OUTPATIENT
Start: 2017-03-20 | End: 2017-03-20

## 2017-03-20 RX ORDER — METOPROLOL TARTRATE 50 MG
0.5 TABLET ORAL
Qty: 14 | Refills: 0 | OUTPATIENT
Start: 2017-03-20 | End: 2017-04-03

## 2017-03-20 RX ORDER — ASCORBIC ACID 60 MG
1 TABLET,CHEWABLE ORAL
Qty: 0 | Refills: 0 | COMMUNITY
Start: 2017-03-20

## 2017-03-20 RX ORDER — METOPROLOL TARTRATE 50 MG
12.5 TABLET ORAL
Qty: 0 | Refills: 0 | COMMUNITY
Start: 2017-03-20

## 2017-03-20 RX ORDER — ACETAMINOPHEN 500 MG
2 TABLET ORAL
Qty: 0 | Refills: 0 | DISCHARGE
Start: 2017-03-20

## 2017-03-20 RX ORDER — ATORVASTATIN CALCIUM 80 MG/1
1 TABLET, FILM COATED ORAL
Qty: 0 | Refills: 0 | COMMUNITY
Start: 2017-03-20

## 2017-03-20 RX ORDER — OXYCODONE HYDROCHLORIDE 5 MG/1
1 TABLET ORAL
Qty: 0 | Refills: 0 | COMMUNITY
Start: 2017-03-20

## 2017-03-20 RX ADMIN — Medication 100 MILLIGRAM(S): at 12:25

## 2017-03-20 RX ADMIN — CILOSTAZOL 50 MILLIGRAM(S): 100 TABLET ORAL at 16:58

## 2017-03-20 RX ADMIN — Medication 10 UNIT(S): at 08:40

## 2017-03-20 RX ADMIN — AMPICILLIN SODIUM AND SULBACTAM SODIUM 100 GRAM(S): 250; 125 INJECTION, POWDER, FOR SUSPENSION INTRAMUSCULAR; INTRAVENOUS at 05:19

## 2017-03-20 RX ADMIN — HEPARIN SODIUM 5000 UNIT(S): 5000 INJECTION INTRAVENOUS; SUBCUTANEOUS at 05:19

## 2017-03-20 RX ADMIN — Medication 12.5 MILLIGRAM(S): at 05:19

## 2017-03-20 RX ADMIN — Medication 15 UNIT(S): at 08:39

## 2017-03-20 RX ADMIN — Medication 500 MILLIGRAM(S): at 12:25

## 2017-03-20 RX ADMIN — Medication 2: at 08:40

## 2017-03-20 RX ADMIN — Medication 81 MILLIGRAM(S): at 12:25

## 2017-03-20 RX ADMIN — HEPARIN SODIUM 5000 UNIT(S): 5000 INJECTION INTRAVENOUS; SUBCUTANEOUS at 13:40

## 2017-03-20 RX ADMIN — Medication 10 UNIT(S): at 12:23

## 2017-03-20 RX ADMIN — Medication 2: at 12:22

## 2017-03-20 RX ADMIN — CILOSTAZOL 50 MILLIGRAM(S): 100 TABLET ORAL at 05:19

## 2017-03-22 LAB — SURGICAL PATHOLOGY STUDY: SIGNIFICANT CHANGE UP

## 2017-03-23 ENCOUNTER — APPOINTMENT (OUTPATIENT)
Dept: VASCULAR SURGERY | Facility: CLINIC | Age: 77
End: 2017-03-23

## 2017-03-29 ENCOUNTER — APPOINTMENT (OUTPATIENT)
Dept: VASCULAR SURGERY | Facility: CLINIC | Age: 77
End: 2017-03-29

## 2017-03-29 VITALS
HEART RATE: 94 BPM | SYSTOLIC BLOOD PRESSURE: 133 MMHG | HEIGHT: 73 IN | BODY MASS INDEX: 29.95 KG/M2 | TEMPERATURE: 98 F | WEIGHT: 226 LBS | DIASTOLIC BLOOD PRESSURE: 66 MMHG

## 2017-04-04 ENCOUNTER — APPOINTMENT (OUTPATIENT)
Dept: VASCULAR SURGERY | Facility: CLINIC | Age: 77
End: 2017-04-04

## 2017-04-04 VITALS
DIASTOLIC BLOOD PRESSURE: 77 MMHG | SYSTOLIC BLOOD PRESSURE: 144 MMHG | TEMPERATURE: 98.2 F | HEART RATE: 76 BPM | WEIGHT: 226 LBS | BODY MASS INDEX: 29.95 KG/M2 | HEIGHT: 73 IN

## 2017-04-04 RX ORDER — INSULIN DETEMIR 100 [IU]/ML
INJECTION, SOLUTION SUBCUTANEOUS
Refills: 0 | Status: ACTIVE | COMMUNITY

## 2017-04-14 ENCOUNTER — APPOINTMENT (OUTPATIENT)
Dept: VASCULAR SURGERY | Facility: CLINIC | Age: 77
End: 2017-04-14

## 2017-04-14 VITALS
WEIGHT: 226 LBS | TEMPERATURE: 98.5 F | BODY MASS INDEX: 29.95 KG/M2 | HEIGHT: 73 IN | DIASTOLIC BLOOD PRESSURE: 61 MMHG | SYSTOLIC BLOOD PRESSURE: 113 MMHG | HEART RATE: 92 BPM

## 2017-05-10 ENCOUNTER — APPOINTMENT (OUTPATIENT)
Dept: VASCULAR SURGERY | Facility: CLINIC | Age: 77
End: 2017-05-10

## 2017-05-10 VITALS
BODY MASS INDEX: 28.63 KG/M2 | HEART RATE: 80 BPM | WEIGHT: 216 LBS | HEIGHT: 73 IN | TEMPERATURE: 98.1 F | DIASTOLIC BLOOD PRESSURE: 75 MMHG | SYSTOLIC BLOOD PRESSURE: 159 MMHG

## 2017-05-10 RX ORDER — SIMVASTATIN 40 MG/1
40 TABLET, FILM COATED ORAL DAILY
Qty: 30 | Refills: 6 | Status: ACTIVE | COMMUNITY
Start: 2017-05-10 | End: 1900-01-01

## 2017-05-10 RX ORDER — SIMVASTATIN 40 MG/1
TABLET, FILM COATED ORAL
Refills: 0 | Status: DISCONTINUED | COMMUNITY
End: 2017-05-10

## 2017-09-13 ENCOUNTER — APPOINTMENT (OUTPATIENT)
Dept: VASCULAR SURGERY | Facility: CLINIC | Age: 77
End: 2017-09-13

## 2017-10-06 ENCOUNTER — APPOINTMENT (OUTPATIENT)
Dept: VASCULAR SURGERY | Facility: CLINIC | Age: 77
End: 2017-10-06
Payer: MEDICARE

## 2017-10-06 VITALS
HEIGHT: 73 IN | SYSTOLIC BLOOD PRESSURE: 109 MMHG | DIASTOLIC BLOOD PRESSURE: 61 MMHG | WEIGHT: 216 LBS | BODY MASS INDEX: 28.63 KG/M2 | HEART RATE: 80 BPM | TEMPERATURE: 98.6 F

## 2017-10-06 PROCEDURE — 99213 OFFICE O/P EST LOW 20 MIN: CPT | Mod: 25

## 2017-10-06 PROCEDURE — XXXXX: CPT

## 2017-10-06 PROCEDURE — 93978 VASCULAR STUDY: CPT

## 2017-10-06 RX ORDER — PEN NEEDLE, DIABETIC 31 GX5/16"
31G X 8 MM NEEDLE, DISPOSABLE MISCELLANEOUS
Qty: 100 | Refills: 0 | Status: ACTIVE | COMMUNITY
Start: 2017-10-02

## 2017-10-06 RX ORDER — PEN NEEDLE, DIABETIC 31 GX3/16"
31G X 5 MM NEEDLE, DISPOSABLE MISCELLANEOUS
Qty: 100 | Refills: 0 | Status: ACTIVE | COMMUNITY
Start: 2017-08-31

## 2017-10-06 RX ORDER — INSULIN DETEMIR 100 [IU]/ML
100 INJECTION, SOLUTION SUBCUTANEOUS
Qty: 15 | Refills: 0 | Status: ACTIVE | COMMUNITY
Start: 2017-09-30

## 2017-11-15 ENCOUNTER — APPOINTMENT (OUTPATIENT)
Dept: VASCULAR SURGERY | Facility: CLINIC | Age: 77
End: 2017-11-15
Payer: MEDICARE

## 2017-11-15 VITALS
SYSTOLIC BLOOD PRESSURE: 182 MMHG | HEART RATE: 94 BPM | DIASTOLIC BLOOD PRESSURE: 67 MMHG | TEMPERATURE: 98 F | BODY MASS INDEX: 28.63 KG/M2 | WEIGHT: 216 LBS | HEIGHT: 73 IN

## 2017-11-15 DIAGNOSIS — I71.4 ABDOMINAL AORTIC ANEURYSM, W/OUT RUPTURE: ICD-10-CM

## 2017-11-15 DIAGNOSIS — I73.9 PERIPHERAL VASCULAR DISEASE, UNSPECIFIED: ICD-10-CM

## 2017-11-15 DIAGNOSIS — I74.09 OTHER ARTERIAL EMBOLISM AND THROMBOSIS OF ABDOMINAL AORTA: ICD-10-CM

## 2017-11-15 PROCEDURE — 99214 OFFICE O/P EST MOD 30 MIN: CPT | Mod: 25

## 2017-11-15 PROCEDURE — 93923 UPR/LXTR ART STDY 3+ LVLS: CPT

## 2017-11-15 PROCEDURE — 93926 LOWER EXTREMITY STUDY: CPT

## 2018-11-29 ENCOUNTER — APPOINTMENT (OUTPATIENT)
Dept: SURGERY | Facility: CLINIC | Age: 78
End: 2018-11-29
Payer: MEDICARE

## 2018-11-29 VITALS
HEART RATE: 88 BPM | BODY MASS INDEX: 28.49 KG/M2 | SYSTOLIC BLOOD PRESSURE: 115 MMHG | WEIGHT: 215 LBS | DIASTOLIC BLOOD PRESSURE: 66 MMHG | HEIGHT: 73 IN

## 2018-11-29 PROCEDURE — 31575 DIAGNOSTIC LARYNGOSCOPY: CPT

## 2018-11-29 PROCEDURE — 99204 OFFICE O/P NEW MOD 45 MIN: CPT | Mod: 25

## 2018-11-29 RX ORDER — AMOXICILLIN 500 MG/1
500 CAPSULE ORAL
Refills: 0 | Status: COMPLETED | COMMUNITY
End: 2018-11-29

## 2018-11-29 RX ORDER — AMOXICILLIN AND CLAVULANATE POTASSIUM 500; 125 MG/1; MG/1
500-125 TABLET, FILM COATED ORAL
Qty: 20 | Refills: 1 | Status: COMPLETED | COMMUNITY
Start: 2017-04-04 | End: 2018-11-29

## 2018-12-04 ENCOUNTER — RESULT REVIEW (OUTPATIENT)
Age: 78
End: 2018-12-04

## 2018-12-07 ENCOUNTER — OTHER (OUTPATIENT)
Age: 78
End: 2018-12-07

## 2018-12-09 ENCOUNTER — FORM ENCOUNTER (OUTPATIENT)
Age: 78
End: 2018-12-09

## 2018-12-17 ENCOUNTER — FORM ENCOUNTER (OUTPATIENT)
Age: 78
End: 2018-12-17

## 2018-12-18 ENCOUNTER — OUTPATIENT (OUTPATIENT)
Dept: OUTPATIENT SERVICES | Facility: HOSPITAL | Age: 78
LOS: 1 days | End: 2018-12-18
Payer: MEDICARE

## 2018-12-18 ENCOUNTER — APPOINTMENT (OUTPATIENT)
Dept: CT IMAGING | Facility: IMAGING CENTER | Age: 78
End: 2018-12-18
Payer: MEDICARE

## 2018-12-18 ENCOUNTER — APPOINTMENT (OUTPATIENT)
Dept: NUCLEAR MEDICINE | Facility: IMAGING CENTER | Age: 78
End: 2018-12-18
Payer: MEDICARE

## 2018-12-18 DIAGNOSIS — Z00.8 ENCOUNTER FOR OTHER GENERAL EXAMINATION: ICD-10-CM

## 2018-12-18 PROCEDURE — 70491 CT SOFT TISSUE NECK W/DYE: CPT | Mod: 26

## 2018-12-18 PROCEDURE — 78815 PET IMAGE W/CT SKULL-THIGH: CPT | Mod: 26,PI

## 2018-12-18 PROCEDURE — A9552: CPT

## 2018-12-18 PROCEDURE — 70491 CT SOFT TISSUE NECK W/DYE: CPT

## 2018-12-18 PROCEDURE — 78815 PET IMAGE W/CT SKULL-THIGH: CPT

## 2018-12-26 ENCOUNTER — OTHER (OUTPATIENT)
Age: 78
End: 2018-12-26

## 2018-12-27 ENCOUNTER — APPOINTMENT (OUTPATIENT)
Dept: SURGERY | Facility: CLINIC | Age: 78
End: 2018-12-27
Payer: MEDICARE

## 2018-12-27 PROCEDURE — 99214 OFFICE O/P EST MOD 30 MIN: CPT | Mod: 25

## 2018-12-27 PROCEDURE — 31575 DIAGNOSTIC LARYNGOSCOPY: CPT

## 2019-01-07 ENCOUNTER — FORM ENCOUNTER (OUTPATIENT)
Age: 79
End: 2019-01-07

## 2019-01-08 ENCOUNTER — APPOINTMENT (OUTPATIENT)
Dept: ULTRASOUND IMAGING | Facility: IMAGING CENTER | Age: 79
End: 2019-01-08
Payer: MEDICARE

## 2019-01-08 ENCOUNTER — OUTPATIENT (OUTPATIENT)
Dept: OUTPATIENT SERVICES | Facility: HOSPITAL | Age: 79
LOS: 1 days | End: 2019-01-08
Payer: MEDICARE

## 2019-01-08 ENCOUNTER — RESULT REVIEW (OUTPATIENT)
Age: 79
End: 2019-01-08

## 2019-01-08 DIAGNOSIS — R59.0 LOCALIZED ENLARGED LYMPH NODES: ICD-10-CM

## 2019-01-08 PROCEDURE — 88173 CYTOPATH EVAL FNA REPORT: CPT | Mod: 26

## 2019-01-08 PROCEDURE — 88173 CYTOPATH EVAL FNA REPORT: CPT

## 2019-01-08 PROCEDURE — 20206 BIOPSY MUSCLE PERQ NEEDLE: CPT

## 2019-01-08 PROCEDURE — 88305 TISSUE EXAM BY PATHOLOGIST: CPT | Mod: 26

## 2019-01-08 PROCEDURE — 76942 ECHO GUIDE FOR BIOPSY: CPT | Mod: 26

## 2019-01-08 PROCEDURE — 88172 CYTP DX EVAL FNA 1ST EA SITE: CPT

## 2019-01-08 PROCEDURE — 88305 TISSUE EXAM BY PATHOLOGIST: CPT

## 2019-01-08 PROCEDURE — 76942 ECHO GUIDE FOR BIOPSY: CPT

## 2019-01-09 ENCOUNTER — APPOINTMENT (OUTPATIENT)
Dept: VASCULAR SURGERY | Facility: CLINIC | Age: 79
End: 2019-01-09

## 2019-01-10 LAB — NON-GYNECOLOGICAL CYTOLOGY STUDY: SIGNIFICANT CHANGE UP

## 2019-01-11 ENCOUNTER — OTHER (OUTPATIENT)
Age: 79
End: 2019-01-11

## 2019-01-15 ENCOUNTER — FORM ENCOUNTER (OUTPATIENT)
Age: 79
End: 2019-01-15

## 2019-01-16 ENCOUNTER — RESULT REVIEW (OUTPATIENT)
Age: 79
End: 2019-01-16

## 2019-01-16 ENCOUNTER — APPOINTMENT (OUTPATIENT)
Dept: ULTRASOUND IMAGING | Facility: IMAGING CENTER | Age: 79
End: 2019-01-16
Payer: MEDICARE

## 2019-01-16 ENCOUNTER — OUTPATIENT (OUTPATIENT)
Dept: OUTPATIENT SERVICES | Facility: HOSPITAL | Age: 79
LOS: 1 days | End: 2019-01-16

## 2019-01-16 DIAGNOSIS — R59.0 LOCALIZED ENLARGED LYMPH NODES: ICD-10-CM

## 2019-01-16 PROCEDURE — 20206 BIOPSY MUSCLE PERQ NEEDLE: CPT

## 2019-01-16 PROCEDURE — 88341 IMHCHEM/IMCYTCHM EA ADD ANTB: CPT

## 2019-01-16 PROCEDURE — 88342 IMHCHEM/IMCYTCHM 1ST ANTB: CPT | Mod: 26

## 2019-01-16 PROCEDURE — 88173 CYTOPATH EVAL FNA REPORT: CPT | Mod: 26

## 2019-01-16 PROCEDURE — 88172 CYTP DX EVAL FNA 1ST EA SITE: CPT

## 2019-01-16 PROCEDURE — 88305 TISSUE EXAM BY PATHOLOGIST: CPT

## 2019-01-16 PROCEDURE — 76942 ECHO GUIDE FOR BIOPSY: CPT

## 2019-01-16 PROCEDURE — 88365 INSITU HYBRIDIZATION (FISH): CPT

## 2019-01-16 PROCEDURE — 88365 INSITU HYBRIDIZATION (FISH): CPT | Mod: 26

## 2019-01-16 PROCEDURE — 88173 CYTOPATH EVAL FNA REPORT: CPT

## 2019-01-16 PROCEDURE — 76942 ECHO GUIDE FOR BIOPSY: CPT | Mod: 26

## 2019-01-16 PROCEDURE — 88305 TISSUE EXAM BY PATHOLOGIST: CPT | Mod: 26

## 2019-01-17 LAB — NON-GYNECOLOGICAL CYTOLOGY STUDY: SIGNIFICANT CHANGE UP

## 2019-01-22 ENCOUNTER — OTHER (OUTPATIENT)
Age: 79
End: 2019-01-22

## 2019-01-23 ENCOUNTER — OUTPATIENT (OUTPATIENT)
Dept: OUTPATIENT SERVICES | Facility: HOSPITAL | Age: 79
LOS: 1 days | Discharge: HOME | End: 2019-01-23

## 2019-01-23 DIAGNOSIS — C44.321 SQUAMOUS CELL CARCINOMA OF SKIN OF NOSE: ICD-10-CM

## 2019-01-30 ENCOUNTER — OUTPATIENT (OUTPATIENT)
Dept: OUTPATIENT SERVICES | Facility: HOSPITAL | Age: 79
LOS: 1 days | Discharge: HOME | End: 2019-01-30
Payer: MEDICARE

## 2019-01-30 ENCOUNTER — APPOINTMENT (OUTPATIENT)
Dept: RADIATION ONCOLOGY | Facility: CLINIC | Age: 79
End: 2019-01-30
Payer: MEDICARE

## 2019-01-30 ENCOUNTER — APPOINTMENT (OUTPATIENT)
Dept: HEMATOLOGY ONCOLOGY | Facility: CLINIC | Age: 79
End: 2019-01-30
Payer: MEDICARE

## 2019-01-30 VITALS
SYSTOLIC BLOOD PRESSURE: 161 MMHG | HEART RATE: 82 BPM | BODY MASS INDEX: 28.28 KG/M2 | TEMPERATURE: 97.9 F | DIASTOLIC BLOOD PRESSURE: 75 MMHG | RESPIRATION RATE: 16 BRPM | OXYGEN SATURATION: 98 % | HEIGHT: 73 IN | WEIGHT: 213.4 LBS

## 2019-01-30 VITALS
WEIGHT: 213.63 LBS | HEIGHT: 72 IN | DIASTOLIC BLOOD PRESSURE: 81 MMHG | BODY MASS INDEX: 28.93 KG/M2 | HEART RATE: 87 BPM | TEMPERATURE: 98.2 F | RESPIRATION RATE: 16 BRPM | SYSTOLIC BLOOD PRESSURE: 156 MMHG | OXYGEN SATURATION: 96 %

## 2019-01-30 DIAGNOSIS — Z87.891 PERSONAL HISTORY OF NICOTINE DEPENDENCE: ICD-10-CM

## 2019-01-30 DIAGNOSIS — I96 GANGRENE, NOT ELSEWHERE CLASSIFIED: ICD-10-CM

## 2019-01-30 DIAGNOSIS — E11.9 TYPE 2 DIABETES MELLITUS W/OUT COMPLICATIONS: ICD-10-CM

## 2019-01-30 DIAGNOSIS — L03.115 CELLULITIS OF RIGHT LOWER LIMB: ICD-10-CM

## 2019-01-30 DIAGNOSIS — Z83.3 FAMILY HISTORY OF DIABETES MELLITUS: ICD-10-CM

## 2019-01-30 PROCEDURE — 99205 OFFICE O/P NEW HI 60 MIN: CPT | Mod: 25

## 2019-01-30 PROCEDURE — 99205 OFFICE O/P NEW HI 60 MIN: CPT

## 2019-01-30 PROCEDURE — 77263 THER RADIOLOGY TX PLNG CPLX: CPT

## 2019-01-30 PROCEDURE — 93010 ELECTROCARDIOGRAM REPORT: CPT

## 2019-01-30 RX ORDER — APIXABAN 5 MG/1
5 TABLET, FILM COATED ORAL
Refills: 0 | Status: DISCONTINUED | COMMUNITY
End: 2019-01-30

## 2019-01-30 RX ORDER — LISINOPRIL 5 MG/1
5 TABLET ORAL
Qty: 90 | Refills: 0 | Status: DISCONTINUED | COMMUNITY
Start: 2017-05-31 | End: 2019-01-30

## 2019-01-30 RX ORDER — METOPROLOL TARTRATE 75 MG/1
TABLET, FILM COATED ORAL
Refills: 0 | Status: DISCONTINUED | COMMUNITY
End: 2019-01-30

## 2019-01-30 RX ORDER — METOCLOPRAMIDE 5 MG/1
5 TABLET ORAL
Qty: 60 | Refills: 1 | Status: ACTIVE | COMMUNITY
Start: 2019-01-30 | End: 1900-01-01

## 2019-01-30 RX ORDER — CILOSTAZOL 100 MG/1
TABLET ORAL
Refills: 0 | Status: DISCONTINUED | COMMUNITY
End: 2019-01-30

## 2019-01-30 RX ORDER — SIMVASTATIN 20 MG/1
20 TABLET, FILM COATED ORAL DAILY
Qty: 60 | Refills: 6 | Status: DISCONTINUED | COMMUNITY
Start: 2017-05-10 | End: 2019-01-30

## 2019-01-30 RX ORDER — CILOSTAZOL 50 MG/1
50 TABLET ORAL
Qty: 60 | Refills: 6 | Status: DISCONTINUED | COMMUNITY
Start: 2017-05-10 | End: 2019-01-30

## 2019-01-30 NOTE — SOCIAL HISTORY
[Former  Cigarette ___ Pack Year(s)] : former pack year(s) of cigarette use: [unfilled] [Date (Year) Stopped: _____] : Date (Year) Stopped: [unfilled]

## 2019-02-01 NOTE — HISTORY OF PRESENT ILLNESS
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 Ultrasound of the neck on 10/26/18 showed a 2.6 cm posterior right neck mass separate from the submandibular and parotid glands. Benign cervical nodes also noted.\par 12/18/18 CT soft tissue neck showed 2.6 cm right lesion inferior to the right parotid tail, findings concerning for metastatic adenopathy and perineural involvement. Scalp soft tissue irregularity left greater than right. 12/18/18 PET/CT showed right base of tongue SUV 4.9, right parotid tail centrally necrotic nodule SUV 7.5, 2.6 cm. Mild left greater than right avidity in the frontal region subcutaneous tissue, SUV 9.4. Possibly muscular activity.\par 1/8/19 ultrasound-guided biopsy of a necrotic right cervical node atypical findings\par 1/16/19 right intraparotid lesion biopsy squamous cell carcinoma, negative P. 16 and HPV. \par

## 2019-02-01 NOTE — PHYSICAL EXAM
[Normal] : oriented to person, place and time, the affect was normal, the mood was normal and not anxious [de-identified] : Right neck palpable node

## 2019-02-06 ENCOUNTER — APPOINTMENT (OUTPATIENT)
Dept: HEMATOLOGY ONCOLOGY | Facility: CLINIC | Age: 79
End: 2019-02-06

## 2019-02-06 ENCOUNTER — RESULT REVIEW (OUTPATIENT)
Age: 79
End: 2019-02-06

## 2019-02-06 LAB
ALBUMIN SERPL ELPH-MCNC: 4.4 G/DL
ALP BLD-CCNC: 92 U/L
ALT SERPL-CCNC: 19 U/L
ANION GAP SERPL CALC-SCNC: 13 MMOL/L
AST SERPL-CCNC: 18 U/L
BASOPHILS # BLD AUTO: 0 K/UL — SIGNIFICANT CHANGE UP (ref 0–0.2)
BASOPHILS NFR BLD AUTO: 0.2 % — SIGNIFICANT CHANGE UP (ref 0–2)
BILIRUB SERPL-MCNC: 0.4 MG/DL
BUN SERPL-MCNC: 30 MG/DL
CALCIUM SERPL-MCNC: 10.3 MG/DL
CHLORIDE SERPL-SCNC: 101 MMOL/L
CO2 SERPL-SCNC: 25 MMOL/L
CREAT SERPL-MCNC: 1.7 MG/DL
EOSINOPHIL # BLD AUTO: 0.1 K/UL — SIGNIFICANT CHANGE UP (ref 0–0.5)
EOSINOPHIL NFR BLD AUTO: 1.1 % — SIGNIFICANT CHANGE UP (ref 0–6)
HAV IGM SER QL: NONREACTIVE
HBV CORE IGM SER QL: NONREACTIVE
HBV SURFACE AG SER QL: NONREACTIVE
HCT VFR BLD CALC: 39 % — SIGNIFICANT CHANGE UP (ref 39–50)
HCV AB SER QL: NONREACTIVE
HCV S/CO RATIO: 0.04 S/CO
HGB BLD-MCNC: 13.1 G/DL — SIGNIFICANT CHANGE UP (ref 13–17)
LYMPHOCYTES # BLD AUTO: 1.2 K/UL — SIGNIFICANT CHANGE UP (ref 1–3.3)
LYMPHOCYTES # BLD AUTO: 20.2 % — SIGNIFICANT CHANGE UP (ref 13–44)
MAGNESIUM SERPL-MCNC: 1.9 MG/DL
MCHC RBC-ENTMCNC: 29.8 PG — SIGNIFICANT CHANGE UP (ref 27–34)
MCHC RBC-ENTMCNC: 33.7 G/DL — SIGNIFICANT CHANGE UP (ref 32–36)
MCV RBC AUTO: 88.6 FL — SIGNIFICANT CHANGE UP (ref 80–100)
MONOCYTES # BLD AUTO: 0.6 K/UL — SIGNIFICANT CHANGE UP (ref 0–0.9)
MONOCYTES NFR BLD AUTO: 9.3 % — SIGNIFICANT CHANGE UP (ref 2–14)
NEUTROPHILS # BLD AUTO: 4.2 K/UL — SIGNIFICANT CHANGE UP (ref 1.8–7.4)
NEUTROPHILS NFR BLD AUTO: 69.2 % — SIGNIFICANT CHANGE UP (ref 43–77)
PLATELET # BLD AUTO: 135 K/UL — LOW (ref 150–400)
POTASSIUM SERPL-SCNC: 5.2 MMOL/L
PROT SERPL-MCNC: 7.4 G/DL
RBC # BLD: 4.4 M/UL — SIGNIFICANT CHANGE UP (ref 4.2–5.8)
RBC # FLD: 12 % — SIGNIFICANT CHANGE UP (ref 10.3–14.5)
SODIUM SERPL-SCNC: 139 MMOL/L
WBC # BLD: 6 K/UL — SIGNIFICANT CHANGE UP (ref 3.8–10.5)
WBC # FLD AUTO: 6 K/UL — SIGNIFICANT CHANGE UP (ref 3.8–10.5)

## 2019-02-15 ENCOUNTER — APPOINTMENT (OUTPATIENT)
Dept: INFUSION THERAPY | Facility: HOSPITAL | Age: 79
End: 2019-02-15

## 2019-02-15 ENCOUNTER — APPOINTMENT (OUTPATIENT)
Dept: HEMATOLOGY ONCOLOGY | Facility: CLINIC | Age: 79
End: 2019-02-15
Payer: MEDICARE

## 2019-02-15 ENCOUNTER — RESULT REVIEW (OUTPATIENT)
Age: 79
End: 2019-02-15

## 2019-02-15 VITALS
DIASTOLIC BLOOD PRESSURE: 70 MMHG | WEIGHT: 212.5 LBS | RESPIRATION RATE: 16 BRPM | TEMPERATURE: 98.1 F | OXYGEN SATURATION: 97 % | SYSTOLIC BLOOD PRESSURE: 131 MMHG | BODY MASS INDEX: 28.82 KG/M2 | HEART RATE: 83 BPM

## 2019-02-15 LAB
HCT VFR BLD CALC: 39 % — SIGNIFICANT CHANGE UP (ref 39–50)
HGB BLD-MCNC: 13.9 G/DL — SIGNIFICANT CHANGE UP (ref 13–17)
MCHC RBC-ENTMCNC: 31.5 PG — SIGNIFICANT CHANGE UP (ref 27–34)
MCHC RBC-ENTMCNC: 35.6 G/DL — SIGNIFICANT CHANGE UP (ref 32–36)
MCV RBC AUTO: 88.6 FL — SIGNIFICANT CHANGE UP (ref 80–100)
PLATELET # BLD AUTO: 126 K/UL — LOW (ref 150–400)
RBC # BLD: 4.4 M/UL — SIGNIFICANT CHANGE UP (ref 4.2–5.8)
RBC # FLD: 12.1 % — SIGNIFICANT CHANGE UP (ref 10.3–14.5)
WBC # BLD: 5.8 K/UL — SIGNIFICANT CHANGE UP (ref 3.8–10.5)
WBC # FLD AUTO: 5.8 K/UL — SIGNIFICANT CHANGE UP (ref 3.8–10.5)

## 2019-02-15 PROCEDURE — 99215 OFFICE O/P EST HI 40 MIN: CPT

## 2019-02-15 RX ORDER — APIXABAN 2.5 MG/1
1 TABLET, FILM COATED ORAL
Qty: 0 | Refills: 0 | COMMUNITY

## 2019-02-15 NOTE — CONSULT LETTER
[Dear  ___] : Dear  [unfilled], [Consult Letter:] : I had the pleasure of evaluating your patient, [unfilled]. [Please see my note below.] : Please see my note below. [Consult Closing:] : Thank you very much for allowing me to participate in the care of this patient.  If you have any questions, please do not hesitate to contact me. [Sincerely,] : Sincerely, [DrBrenden  ___] : Dr. GONZALES [DrBrenden ___] : Dr. GONZALES [FreeTextEntry2] : Otilio Portillo MD\par [facility]\par 3rd Floor\par [address] [facility]\par [place] [id number] [FreeTextEntry3] : Mikael Doll

## 2019-02-15 NOTE — ASSESSMENT
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 The squamous caner may be a cancer of unknown origin ; however it is near the site of a previously resected skin carcinoma; there is also a hypopharynx (base of tongue) area of  FDG uptake on CT/PET) : opening the possibility of stage 3 cutaneous squamous cell carcinoma or stage 3 hypopharynx carcinoma. There is no imaging or physical examination evidence of a hypopharynx mass. \par Given the patient's age, comorbidity , PVD and the risk of kidney damage with cisplatin, I would avoid this medication in the current plan of treatment with radiation. He has tinnitus which may be worsened with cisplatin.\par I discussed the concomitant use of cetuximab and radiation therapy with the patient and the family. The patient was given printed educational material on the use and side effects of cetuximab medication . Side effects discussed included and were not limited to first dose allergic phenomena, hypotension, support with ventilator; death has been reported with severe allergy. The patient may experience rash, electrolyte disturbance, infection, oral ulcers, difficulty swallowing lowering of bloods cell counts, need for transfusion, nervous system, liver and renal side effects. He has signed written informed consent for chemotherapy treatment.\par He is asked to attend new patient educational class for further discussion of support at [facility].\par Medication prescribed in treatment of side effects included diphenhydramine, minocycline, and metoclopramide as listed above.\par patient will see me in three weeks. Laboratory studies requested. ECG personally performed by Ms Emeka AZUL and me and interpreted by me. Ms Hendrickson provided patient education material.

## 2019-02-15 NOTE — HISTORY OF PRESENT ILLNESS
[Treatment Protocol] : Treatment Protocol [Cardiovascular] : Cardiovascular [Constitutional] : Constitutional [ENT] : ENT [Dermatologic] : Dermatologic [Endocrine] : Endocrine [Gastrointestinal] : Gastrointestinal [Genitourinary] : Genitourinary [Gynecologic] : Gynecologic [Infectious] : Infectious [Musculoskeletal] : Musculoskeletal [Neurologic] : Neurologic [Pain] : Pain [Pulmonary] : Pulmonary [Hematologic] : Hematologic [Date: ____________] : Patient's last distress assessment performed on [unfilled]. [1 - Distress Level] : Distress Level: 1 [Disease: _____________________] : Disease: [unfilled] [T: ___] : T[unfilled] [N: ___] : N[unfilled] [M: ___] : M[unfilled] [AJCC Stage: ____] : AJCC Stage: [unfilled] [de-identified] : The patient noted a mass in the right side of his neck in November 2018; he described the mass as increasing and decreasing in size. There was  no weight loss. The mass does not cause pain.\par He visited Dr Santo Reveles of [place] who referred him for a biopsy of the right neck mass. The biopsy results were interpreted a suspicious for malignancy.\par He was referred to Dr Otilio Portillo who preformed an additional biopsy.A CT of the neck and a PET/CT were performed on December 18 2018.\par Imaging results showed a 2.2 cm X 2.1 cm X 2.6  cm lesion in the right neck superior to the right parotid. There was additional small lymphadenopathy on the contralateral side. CT/PET revealed hypermetabolism over the right neck mass and at the base of the tongue. 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 \par He has a history of skin cancer on his right cheek face (3 or 4 years ago) and arms (different times in the past 5 years).\par He had 10 Mohs surgeries at various times and locations in his body. \par He has 40 year history of type 2 diabetes (for which he receives insulin daily) and peripheral vascular disease.He had right toe gangrene resulting in amputation of the right fourth toe in 2016.\par he has an abdominal aortic aneysm\par He had ARV open surgery approximately 10 years ago with concurrent CABG X 3. Neither the patient or family members recollect the exact year [de-identified] : squamous cell carcinoma;  [de-identified] : HPV, p 16 undetermined : too few cells for analysis [FreeTextEntry1] : discussion of cetuximab [de-identified] : The patient feels unchanged. He does not have symptoms from the neck mass. He has met with radiation medicine today and he in aware of a plan to use chemotherapy and radiation treatment

## 2019-02-15 NOTE — REASON FOR VISIT
[Initial Consultation] : an initial consultation [Spouse] : spouse [Family Member] : family member [Other: _____] : [unfilled] [FreeTextEntry2] : I am here for the evaluation of a lump on my right neck

## 2019-02-15 NOTE — REVIEW OF SYSTEMS
[Fatigue] : fatigue [Vision Problems] : vision problems [Loss of Hearing] : loss of hearing [Leg Claudication] : intermittent leg claudication [Lower Ext Edema] : lower extremity edema [Shortness Of Breath] : shortness of breath [Cough] : cough [SOB on Exertion] : shortness of breath during exertion [Easy Bruising] : a tendency for easy bruising [Negative] : Allergic/Immunologic [Fever] : no fever [Chills] : no chills [Night Sweats] : no night sweats [Recent Change In Weight] : ~T no recent weight change [Eye Pain] : no eye pain [Red Eyes] : eyes not red [Dry Eyes] : no dryness of the eyes [Dysphagia] : no dysphagia [Nosebleeds] : no nosebleeds [Hoarseness] : no hoarseness [Odynophagia] : no odynophagia [Mucosal Pain] : no mucosal pain [Chest Pain] : no chest pain [Palpitations] : no palpitations [Wheezing] : no wheezing [Abdominal Pain] : no abdominal pain [Vomiting] : no vomiting [Constipation] : no constipation [Diarrhea] : no diarrhea [Dysuria] : no dysuria [Incontinence] : no incontinence [Proptosis] : no proptosis [Hot Flashes] : no hot flashes [Muscle Weakness] : no muscle weakness [Deepening Of The Voice] : no deepening of the voice [Easy Bleeding] : no tendency for easy bleeding [Swollen Glands] : no swollen glands [FreeTextEntry4] : right ear tinnitus [FreeTextEntry9] : right fourth toe resection [de-identified] : diabetes

## 2019-02-19 ENCOUNTER — APPOINTMENT (OUTPATIENT)
Dept: SURGERY | Facility: CLINIC | Age: 79
End: 2019-02-19

## 2019-02-19 ENCOUNTER — OUTPATIENT (OUTPATIENT)
Dept: OUTPATIENT SERVICES | Facility: HOSPITAL | Age: 79
LOS: 1 days | Discharge: HOME | End: 2019-02-19

## 2019-02-19 DIAGNOSIS — C44.321 SQUAMOUS CELL CARCINOMA OF SKIN OF NOSE: ICD-10-CM

## 2019-02-19 DIAGNOSIS — Z51.11 ENCOUNTER FOR ANTINEOPLASTIC CHEMOTHERAPY: ICD-10-CM

## 2019-02-19 DIAGNOSIS — R11.2 NAUSEA WITH VOMITING, UNSPECIFIED: ICD-10-CM

## 2019-02-19 PROCEDURE — 77301 RADIOTHERAPY DOSE PLAN IMRT: CPT | Mod: 26

## 2019-02-19 PROCEDURE — 77338 DESIGN MLC DEVICE FOR IMRT: CPT | Mod: 26

## 2019-02-19 PROCEDURE — 77300 RADIATION THERAPY DOSE PLAN: CPT | Mod: 26

## 2019-02-20 NOTE — HISTORY OF PRESENT ILLNESS
[0 - No Distress] : Distress Level: 0 [Disease: _____________________] : Disease: [unfilled] [T: ___] : T[unfilled] [N: ___] : N[unfilled] [M: ___] : M[unfilled] [AJCC Stage: ____] : AJCC Stage: [unfilled] [Treatment Protocol] : Treatment Protocol [Cardiovascular] : Cardiovascular [Constitutional] : Constitutional [ENT] : ENT [Dermatologic] : Dermatologic [Endocrine] : Endocrine [Gastrointestinal] : Gastrointestinal [Genitourinary] : Genitourinary [Gynecologic] : Gynecologic [Infectious] : Infectious [Musculoskeletal] : Musculoskeletal [Neurologic] : Neurologic [Pain] : Pain [Pulmonary] : Pulmonary [Hematologic] : Hematologic [Date: ____________] : Patient's last distress assessment performed on [unfilled]. [5 - Distress Level] : Distress Level: 5 [de-identified] : The patient noted a mass in the right side of his neck in November 2018; he described the mass as increasing and decreasing in size. There was  no weight loss. The mass does not cause pain.\par He visited Dr Santo Reveles of [place] who referred him for a biopsy of the right neck mass. The biopsy results were interpreted a suspicious for malignancy.\par He was referred to Dr Otilio Portillo who preformed an additional biopsy.A CT of the neck and a PET/CT were performed on December 18 2018.\par Imaging results showed a 2.2 cm X 2.1 cm X 2.6  cm lesion in the right neck superior to the right parotid. There was additional small lymphadenopathy on the contralateral side. CT/PET revealed hypermetabolism over the right neck mass and at the base of the tongue. 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 \par He has a history of skin cancer on his right cheek face (3 or 4 years ago) and arms (different times in the past 5 years).\par He had 10 Mohs surgeries at various times and locations in his body. \par He has 40 year history of type 2 diabetes (for which he receives insulin daily) and peripheral vascular disease.He had right toe gangrene resulting in amputation of the right fourth toe in 2016.\par he has an abdominal aortic aneurysm\par He had ARV open surgery approximately 10 years ago with concurrent CABG X 3. Neither the patient or family members recollect the exact year [de-identified] : squamous cell carcinoma;  [de-identified] : HPV, p 16 undetermined : too few cells for analysis [FreeTextEntry1] : initial cetuximab [de-identified] : He has been feeling well. Planned first dose of cetuximab today. He has taken oral Benadryl prior to today's visit. Sleeping well. Family  is buying oral nutrional supplements

## 2019-02-20 NOTE — ASSESSMENT
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 he is in the process of receiving radiation therapy and he is accompanied by family today. He has reviewed the proffered educational material about the use and the side effects of the treatment; he has previously signed  consent for treatment in his earlier visit. He will receive first dose of cetuximab today. He has reviewed the educational material and I have discussed the possibly of acute allergic reaction. Follow up in 2 weeks to see me or Mr Evert MCGHEE

## 2019-02-20 NOTE — RESULTS/DATA
[FreeTextEntry1] : review of initial laboratory studies and today's CBC remarkable for platelet count of 121 000

## 2019-02-20 NOTE — PHYSICAL EXAM
[Restricted in physically strenuous activity but ambulatory and able to carry out work of a light or sedentary nature] : Status 1- Restricted in physically strenuous activity but ambulatory and able to carry out work of a light or sedentary nature, e.g., light house work, office work [Normal] : affect appropriate [Ulcers] : no ulcers [Mucositis] : no mucositis [Thrush] : no thrush [de-identified] : elderly  [de-identified] : bilateral cataracts [de-identified] : right sub mental mass level 1 ; 2 cm X 2 cm [de-identified] : right 4 th toe amputation [de-identified] : seborrhea keratosis over scalp back,chest  [de-identified] : forgetful

## 2019-02-20 NOTE — HISTORY OF PRESENT ILLNESS
[0 - No Distress] : Distress Level: 0 [Disease: _____________________] : Disease: [unfilled] [T: ___] : T[unfilled] [N: ___] : N[unfilled] [M: ___] : M[unfilled] [AJCC Stage: ____] : AJCC Stage: [unfilled] [Treatment Protocol] : Treatment Protocol [Cardiovascular] : Cardiovascular [Constitutional] : Constitutional [ENT] : ENT [Dermatologic] : Dermatologic [Endocrine] : Endocrine [Gastrointestinal] : Gastrointestinal [Genitourinary] : Genitourinary [Gynecologic] : Gynecologic [Infectious] : Infectious [Musculoskeletal] : Musculoskeletal [Neurologic] : Neurologic [Pain] : Pain [Pulmonary] : Pulmonary [Hematologic] : Hematologic [Date: ____________] : Patient's last distress assessment performed on [unfilled]. [5 - Distress Level] : Distress Level: 5 [de-identified] : The patient noted a mass in the right side of his neck in November 2018; he described the mass as increasing and decreasing in size. There was  no weight loss. The mass does not cause pain.\par He visited Dr Santo Reveles of [place] who referred him for a biopsy of the right neck mass. The biopsy results were interpreted a suspicious for malignancy.\par He was referred to Dr Otilio Portillo who preformed an additional biopsy.A CT of the neck and a PET/CT were performed on December 18 2018.\par Imaging results showed a 2.2 cm X 2.1 cm X 2.6  cm lesion in the right neck superior to the right parotid. There was additional small lymphadenopathy on the contralateral side. CT/PET revealed hypermetabolism over the right neck mass and at the base of the tongue. 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 \par He has a history of skin cancer on his right cheek face (3 or 4 years ago) and arms (different times in the past 5 years).\par He had 10 Mohs surgeries at various times and locations in his body. \par He has 40 year history of type 2 diabetes (for which he receives insulin daily) and peripheral vascular disease.He had right toe gangrene resulting in amputation of the right fourth toe in 2016.\par he has an abdominal aortic aneurysm\par He had ARV open surgery approximately 10 years ago with concurrent CABG X 3. Neither the patient or family members recollect the exact year [de-identified] : squamous cell carcinoma;  [de-identified] : HPV, p 16 undetermined : too few cells for analysis [FreeTextEntry1] : initial cetuximab [de-identified] : He has been feeling well. Planned first dose of cetuximab today. He has taken oral Benadryl prior to today's visit. Sleeping well. Family  is buying oral nutrional supplements

## 2019-02-20 NOTE — HISTORY OF PRESENT ILLNESS
[0 - No Distress] : Distress Level: 0 [Disease: _____________________] : Disease: [unfilled] [T: ___] : T[unfilled] [N: ___] : N[unfilled] [M: ___] : M[unfilled] [AJCC Stage: ____] : AJCC Stage: [unfilled] [Treatment Protocol] : Treatment Protocol [Cardiovascular] : Cardiovascular [Constitutional] : Constitutional [ENT] : ENT [Dermatologic] : Dermatologic [Endocrine] : Endocrine [Gastrointestinal] : Gastrointestinal [Genitourinary] : Genitourinary [Gynecologic] : Gynecologic [Infectious] : Infectious [Musculoskeletal] : Musculoskeletal [Neurologic] : Neurologic [Pain] : Pain [Pulmonary] : Pulmonary [Hematologic] : Hematologic [Date: ____________] : Patient's last distress assessment performed on [unfilled]. [5 - Distress Level] : Distress Level: 5 [de-identified] : The patient noted a mass in the right side of his neck in November 2018; he described the mass as increasing and decreasing in size. There was  no weight loss. The mass does not cause pain.\par He visited Dr Santo Reveles of [place] who referred him for a biopsy of the right neck mass. The biopsy results were interpreted a suspicious for malignancy.\par He was referred to Dr Otilio Portillo who preformed an additional biopsy.A CT of the neck and a PET/CT were performed on December 18 2018.\par Imaging results showed a 2.2 cm X 2.1 cm X 2.6  cm lesion in the right neck superior to the right parotid. There was additional small lymphadenopathy on the contralateral side. CT/PET revealed hypermetabolism over the right neck mass and at the base of the tongue. 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 \par He has a history of skin cancer on his right cheek face (3 or 4 years ago) and arms (different times in the past 5 years).\par He had 10 Mohs surgeries at various times and locations in his body. \par He has 40 year history of type 2 diabetes (for which he receives insulin daily) and peripheral vascular disease.He had right toe gangrene resulting in amputation of the right fourth toe in 2016.\par he has an abdominal aortic aneurysm\par He had ARV open surgery approximately 10 years ago with concurrent CABG X 3. Neither the patient or family members recollect the exact year [de-identified] : squamous cell carcinoma;  [de-identified] : HPV, p 16 undetermined : too few cells for analysis [FreeTextEntry1] : initial cetuximab [de-identified] : He has been feeling well. Planned first dose of cetuximab today. He has taken oral Benadryl prior to today's visit. Sleeping well. Family  is buying oral nutrional supplements

## 2019-02-20 NOTE — PHYSICAL EXAM
[Restricted in physically strenuous activity but ambulatory and able to carry out work of a light or sedentary nature] : Status 1- Restricted in physically strenuous activity but ambulatory and able to carry out work of a light or sedentary nature, e.g., light house work, office work [Normal] : affect appropriate [Ulcers] : no ulcers [Mucositis] : no mucositis [Thrush] : no thrush [de-identified] : elderly  [de-identified] : bilateral cataracts [de-identified] : right sub mental mass level 1 ; 2 cm X 2 cm [de-identified] : right 4 th toe amputation [de-identified] : seborrhea keratosis over scalp back,chest  [de-identified] : forgetful

## 2019-02-22 PROCEDURE — 77387B: CUSTOM | Mod: 26

## 2019-02-22 PROCEDURE — 77427 RADIATION TX MANAGEMENT X5: CPT

## 2019-02-25 ENCOUNTER — APPOINTMENT (OUTPATIENT)
Dept: INFUSION THERAPY | Facility: HOSPITAL | Age: 79
End: 2019-02-25

## 2019-02-25 ENCOUNTER — RESULT REVIEW (OUTPATIENT)
Age: 79
End: 2019-02-25

## 2019-02-25 LAB
HCT VFR BLD CALC: 38.8 % — LOW (ref 39–50)
HGB BLD-MCNC: 13.5 G/DL — SIGNIFICANT CHANGE UP (ref 13–17)
MCHC RBC-ENTMCNC: 30.4 PG — SIGNIFICANT CHANGE UP (ref 27–34)
MCHC RBC-ENTMCNC: 34.7 G/DL — SIGNIFICANT CHANGE UP (ref 32–36)
MCV RBC AUTO: 87.4 FL — SIGNIFICANT CHANGE UP (ref 80–100)
PLATELET # BLD AUTO: 125 K/UL — LOW (ref 150–400)
RBC # BLD: 4.44 M/UL — SIGNIFICANT CHANGE UP (ref 4.2–5.8)
RBC # FLD: 12.2 % — SIGNIFICANT CHANGE UP (ref 10.3–14.5)
WBC # BLD: 5 K/UL — SIGNIFICANT CHANGE UP (ref 3.8–10.5)
WBC # FLD AUTO: 5 K/UL — SIGNIFICANT CHANGE UP (ref 3.8–10.5)

## 2019-02-25 PROCEDURE — 77387B: CUSTOM | Mod: 26

## 2019-02-26 VITALS — BODY MASS INDEX: 29.12 KG/M2 | WEIGHT: 215 LBS | RESPIRATION RATE: 16 BRPM | HEIGHT: 72 IN

## 2019-02-26 DIAGNOSIS — R11.2 NAUSEA WITH VOMITING, UNSPECIFIED: ICD-10-CM

## 2019-02-26 DIAGNOSIS — Z51.11 ENCOUNTER FOR ANTINEOPLASTIC CHEMOTHERAPY: ICD-10-CM

## 2019-02-26 PROCEDURE — 77387B: CUSTOM | Mod: 26

## 2019-02-27 PROCEDURE — 77387B: CUSTOM | Mod: 26

## 2019-02-28 PROCEDURE — 77014: CPT | Mod: 26

## 2019-03-01 PROCEDURE — 77427 RADIATION TX MANAGEMENT X5: CPT

## 2019-03-01 PROCEDURE — 77387B: CUSTOM | Mod: 26

## 2019-03-04 ENCOUNTER — APPOINTMENT (OUTPATIENT)
Dept: INFUSION THERAPY | Facility: HOSPITAL | Age: 79
End: 2019-03-04

## 2019-03-05 ENCOUNTER — LABORATORY RESULT (OUTPATIENT)
Age: 79
End: 2019-03-05

## 2019-03-05 ENCOUNTER — APPOINTMENT (OUTPATIENT)
Dept: HEMATOLOGY ONCOLOGY | Facility: CLINIC | Age: 79
End: 2019-03-05
Payer: MEDICARE

## 2019-03-05 ENCOUNTER — APPOINTMENT (OUTPATIENT)
Dept: INFUSION THERAPY | Facility: HOSPITAL | Age: 79
End: 2019-03-05

## 2019-03-05 ENCOUNTER — RESULT REVIEW (OUTPATIENT)
Age: 79
End: 2019-03-05

## 2019-03-05 DIAGNOSIS — K59.00 CONSTIPATION, UNSPECIFIED: ICD-10-CM

## 2019-03-05 LAB
HCT VFR BLD CALC: 37.6 % — LOW (ref 39–50)
HGB BLD-MCNC: 13.3 G/DL — SIGNIFICANT CHANGE UP (ref 13–17)
MCHC RBC-ENTMCNC: 31.2 PG — SIGNIFICANT CHANGE UP (ref 27–34)
MCHC RBC-ENTMCNC: 35.3 G/DL — SIGNIFICANT CHANGE UP (ref 32–36)
MCV RBC AUTO: 88.4 FL — SIGNIFICANT CHANGE UP (ref 80–100)
PLATELET # BLD AUTO: 133 K/UL — LOW (ref 150–400)
RBC # BLD: 4.25 M/UL — SIGNIFICANT CHANGE UP (ref 4.2–5.8)
RBC # FLD: 12.4 % — SIGNIFICANT CHANGE UP (ref 10.3–14.5)
WBC # BLD: 6.6 K/UL — SIGNIFICANT CHANGE UP (ref 3.8–10.5)
WBC # FLD AUTO: 6.6 K/UL — SIGNIFICANT CHANGE UP (ref 3.8–10.5)

## 2019-03-05 PROCEDURE — 77387B: CUSTOM | Mod: 26

## 2019-03-05 PROCEDURE — 99214 OFFICE O/P EST MOD 30 MIN: CPT

## 2019-03-05 RX ORDER — DIPHENHYDRAMINE HCL 25 MG/1
25 TABLET ORAL 3 TIMES DAILY
Qty: 30 | Refills: 33 | Status: COMPLETED | COMMUNITY
Start: 2019-01-30 | End: 2019-03-05

## 2019-03-05 RX ORDER — DOCUSATE SODIUM 100 MG/1
100 CAPSULE ORAL TWICE DAILY
Qty: 60 | Refills: 0 | Status: ACTIVE | COMMUNITY
Start: 2019-03-05 | End: 1900-01-01

## 2019-03-06 VITALS
BODY MASS INDEX: 28.88 KG/M2 | RESPIRATION RATE: 16 BRPM | HEART RATE: 87 BPM | SYSTOLIC BLOOD PRESSURE: 188 MMHG | OXYGEN SATURATION: 97 % | WEIGHT: 212.96 LBS | DIASTOLIC BLOOD PRESSURE: 76 MMHG

## 2019-03-06 PROBLEM — K59.00 CONSTIPATION, UNSPECIFIED CONSTIPATION TYPE: Status: ACTIVE | Noted: 2019-03-05

## 2019-03-06 PROCEDURE — 77387B: CUSTOM | Mod: 26

## 2019-03-06 NOTE — ASSESSMENT
[Supportive] : Goals of care discussed with patient: Supportive [Palliative Care Plan] : not applicable at this time [FreeTextEntry1] : Kenneth Benjamin is a 79 year old male diagnosed with right cheek squamous cell carcinoma, currently on cycle # 3 Erbitux. To address the skin reactions, he was advised to the following: continue minocycline as directed, moisturize the skin (Udderly Smooth sample packets were provided), clindamycin gel will be considered if skin does not improve by next week. Patient also recommended to increase his fluid and fiber intake, and prescription of Colace was sent to his home pharmacy. Magic mouthwash was prescribed to treat his oral cavity related pain. Return to the office in 2-3 weeks.

## 2019-03-06 NOTE — HISTORY OF PRESENT ILLNESS
[Disease: _____________________] : Disease: [unfilled] [T: ___] : T[unfilled] [N: ___] : N[unfilled] [M: ___] : M[unfilled] [AJCC Stage: ____] : AJCC Stage: [unfilled] [Treatment Protocol] : Treatment Protocol [Cardiovascular] : Cardiovascular [Constitutional] : Constitutional [ENT] : ENT [Dermatologic] : Dermatologic [Endocrine] : Endocrine [Gastrointestinal] : Gastrointestinal [Genitourinary] : Genitourinary [Gynecologic] : Gynecologic [Infectious] : Infectious [Musculoskeletal] : Musculoskeletal [Neurologic] : Neurologic [Pain] : Pain [Pulmonary] : Pulmonary [Hematologic] : Hematologic [___________________________________] : Drug: [unfilled] [de-identified] : The patient first noted a lump in the right side of his neck in November 2018; he described the mass as increasing in size. There was no associated weight loss. The mass does not cause pain. He eventually visited Dr Santo Reveles of [place] who referred him for a biopsy of the right neck mass. The biopsy results were interpreted a suspicious for malignancy. He was subsequently referred to Dr Otilio Portillo (surgery), who preformed an additional biopsy.A CT of the neck and a PET/CT were performed on December 18 2018. Imaging results showed a 2.2 cm X 2.1 cm X 2.6  cm lesion in the right neck superior to the right parotid. There was additional small lymphadenopathy on the contralateral side. CT/PET revealed hypermetabolism over the right neck mass and at the base of the tongue. There was no corresponding CT or physical examination finding of the base of the tongue lesion. 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 \par Patient noted to have a history of skin cancer on his right cheek face (3 or 4 years ago) and arms (different times in the past 5 years).\par He had 10 Mohs surgeries at various times and locations in his body. \par He has 40 year history of type 2 diabetes (for which he receives insulin daily) and peripheral vascular disease.He had right toe gangrene resulting in amputation of the right fourth toe in 2016.\par he has an abdominal aortic aneurysm\par He had ARV open surgery approximately 10 years ago with concurrent CABG X 3. Neither the patient or family members recollect the exact year [de-identified] : squamous cell carcinoma;  [de-identified] : HPV, p 16 undetermined : too few cells for analysis [FreeTextEntry1] : Erbitux cycle # 3 today [de-identified] : Patient complained of skin rash dispersed throughout his face and scalp. He noted generalized dry skin, skin peeling of bilateral hands.  Patient also noted constipation (1 bowel movement every 2-3 days) for the past 2 weeks and discomfort/difficulty with swallowing solids/liquids.  [de-identified] : acne-like rash, dry skin, skin peeling of bilateral hands [Date: ____________] : Patient's last distress assessment performed on [unfilled]. [0 - No Distress] : Distress Level: 0

## 2019-03-06 NOTE — PHYSICAL EXAM
[Restricted in physically strenuous activity but ambulatory and able to carry out work of a light or sedentary nature] : Status 1- Restricted in physically strenuous activity but ambulatory and able to carry out work of a light or sedentary nature, e.g., light house work, office work [Normal] : affect appropriate [Ulcers] : no ulcers [Mucositis] : no mucositis [Thrush] : no thrush [de-identified] : elderly  [de-identified] : bilateral cataracts [de-identified] : right sub mental mass level 1 ; 1.5 cm X 1.5 cm [de-identified] : right 4th toe amputation [de-identified] : seborrhea keratosis over scalp back,chest

## 2019-03-07 VITALS
RESPIRATION RATE: 16 BRPM | WEIGHT: 214.4 LBS | OXYGEN SATURATION: 97 % | TEMPERATURE: 98.2 F | DIASTOLIC BLOOD PRESSURE: 76 MMHG | SYSTOLIC BLOOD PRESSURE: 196 MMHG | BODY MASS INDEX: 29.04 KG/M2 | HEART RATE: 99 BPM | HEIGHT: 72 IN

## 2019-03-07 PROCEDURE — 77387B: CUSTOM | Mod: 26

## 2019-03-08 PROCEDURE — 77014: CPT | Mod: 26

## 2019-03-11 ENCOUNTER — RESULT REVIEW (OUTPATIENT)
Age: 79
End: 2019-03-11

## 2019-03-11 ENCOUNTER — APPOINTMENT (OUTPATIENT)
Dept: INFUSION THERAPY | Facility: HOSPITAL | Age: 79
End: 2019-03-11

## 2019-03-11 LAB
HCT VFR BLD CALC: 40.1 % — SIGNIFICANT CHANGE UP (ref 39–50)
HGB BLD-MCNC: 14 G/DL — SIGNIFICANT CHANGE UP (ref 13–17)
MCHC RBC-ENTMCNC: 31.3 PG — SIGNIFICANT CHANGE UP (ref 27–34)
MCHC RBC-ENTMCNC: 35 G/DL — SIGNIFICANT CHANGE UP (ref 32–36)
MCV RBC AUTO: 89.5 FL — SIGNIFICANT CHANGE UP (ref 80–100)
PLATELET # BLD AUTO: 134 K/UL — LOW (ref 150–400)
RBC # BLD: 4.48 M/UL — SIGNIFICANT CHANGE UP (ref 4.2–5.8)
RBC # FLD: 12.7 % — SIGNIFICANT CHANGE UP (ref 10.3–14.5)
WBC # BLD: 6.6 K/UL — SIGNIFICANT CHANGE UP (ref 3.8–10.5)
WBC # FLD AUTO: 6.6 K/UL — SIGNIFICANT CHANGE UP (ref 3.8–10.5)

## 2019-03-11 PROCEDURE — 77427 RADIATION TX MANAGEMENT X5: CPT

## 2019-03-11 PROCEDURE — 77387B: CUSTOM | Mod: 26

## 2019-03-12 NOTE — HISTORY OF PRESENT ILLNESS
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 16 and HPV. He is undergoing chemoradiation with Erbitux. \par \par 3/35 fractions today. No changes from baseline. On chemotherapy with Dr. Doll. He has stable xerostomia and has started marshmallow root. Eating well.

## 2019-03-12 NOTE — REVIEW OF SYSTEMS
[Dysphagia: Grade 0] : Dysphagia: Grade 0 [Xerostomia: Grade 1 - Symptomatic (e.g., dry or thick saliva) without significant dietary alteration; unstimulated saliva flow >0.2 ml/min] : Xerostomia: Grade 1 - Symptomatic (e.g., dry or thick saliva) without significant dietary alteration; unstimulated saliva flow >0.2 ml/min [FreeTextEntry4] : baseline

## 2019-03-13 VITALS
HEART RATE: 98 BPM | RESPIRATION RATE: 16 BRPM | OXYGEN SATURATION: 95 % | DIASTOLIC BLOOD PRESSURE: 97 MMHG | SYSTOLIC BLOOD PRESSURE: 183 MMHG | BODY MASS INDEX: 28.67 KG/M2 | WEIGHT: 211.42 LBS

## 2019-03-13 PROCEDURE — 77387B: CUSTOM | Mod: 26

## 2019-03-13 NOTE — DISEASE MANAGEMENT
[Clinical] : TNM Stage: c [I] : I [TTNM] : 1 [NTNM] : 1 [MTNM] : 0 [de-identified] : 7000 cGy [de-identified] : base of tongue

## 2019-03-13 NOTE — REVIEW OF SYSTEMS
[Dysphagia: Grade 0] : Dysphagia: Grade 0 [Tinnitus - Grade 0] : Tinnitus - Grade 0 [Blurred Vision: Grade 0] : Blurred Vision: Grade 0 [Mucositis Oral: Grade 0] : Mucositis Oral: Grade 0  [Xerostomia: Grade 1 - Symptomatic (e.g., dry or thick saliva) without significant dietary alteration; unstimulated saliva flow >0.2 ml/min] : Xerostomia: Grade 1 - Symptomatic (e.g., dry or thick saliva) without significant dietary alteration; unstimulated saliva flow >0.2 ml/min [Oral Pain: Grade 0] : Oral Pain: Grade 0 [Salivary duct inflammation: Grade 0] : Salivary duct inflammation: Grade 0 [Dysgeusia: Grade 0] : Dysgeusia: Grade 0 [Alopecia: Grade 0] : Alopecia: Grade 0 [Pruritus: Grade 0] : Pruritus: Grade 0 [Skin Atrophy: Grade 0] : Skin Atrophy: Grade 0 [Skin Hyperpigmentation: Grade 0] : Skin Hyperpigmentation: Grade 0 [Skin Induration: Grade 0] : Skin Induration: Grade 0 [Dermatitis Radiation: Grade 0] : Dermatitis Radiation: Grade 0 [FreeTextEntry4] : baseline

## 2019-03-13 NOTE — HISTORY OF PRESENT ILLNESS
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 16 and HPV. He is undergoing chemoradiation with Erbitux. \par \par 8/35 fractions today. No changes from baseline. On chemotherapy with Dr. Doll. He has stable xerostomia and has started marshmallow root. Eating well.

## 2019-03-14 PROCEDURE — 77387B: CUSTOM | Mod: 26

## 2019-03-15 PROCEDURE — 77014: CPT | Mod: 26

## 2019-03-18 ENCOUNTER — RESULT REVIEW (OUTPATIENT)
Age: 79
End: 2019-03-18

## 2019-03-18 ENCOUNTER — APPOINTMENT (OUTPATIENT)
Dept: INFUSION THERAPY | Facility: HOSPITAL | Age: 79
End: 2019-03-18

## 2019-03-18 LAB
HCT VFR BLD CALC: 39.7 % — SIGNIFICANT CHANGE UP (ref 39–50)
HGB BLD-MCNC: 14.4 G/DL — SIGNIFICANT CHANGE UP (ref 13–17)
MCHC RBC-ENTMCNC: 31.9 PG — SIGNIFICANT CHANGE UP (ref 27–34)
MCHC RBC-ENTMCNC: 36.2 G/DL — HIGH (ref 32–36)
MCV RBC AUTO: 88.1 FL — SIGNIFICANT CHANGE UP (ref 80–100)
PLATELET # BLD AUTO: 100 K/UL — LOW (ref 150–400)
RBC # BLD: 4.51 M/UL — SIGNIFICANT CHANGE UP (ref 4.2–5.8)
RBC # FLD: 12.5 % — SIGNIFICANT CHANGE UP (ref 10.3–14.5)
WBC # BLD: 6.2 K/UL — SIGNIFICANT CHANGE UP (ref 3.8–10.5)
WBC # FLD AUTO: 6.2 K/UL — SIGNIFICANT CHANGE UP (ref 3.8–10.5)

## 2019-03-18 PROCEDURE — 77387B: CUSTOM | Mod: 26

## 2019-03-19 ENCOUNTER — OUTPATIENT (OUTPATIENT)
Dept: OUTPATIENT SERVICES | Facility: HOSPITAL | Age: 79
LOS: 1 days | Discharge: HOME | End: 2019-03-19

## 2019-03-19 DIAGNOSIS — C44.321 SQUAMOUS CELL CARCINOMA OF SKIN OF NOSE: ICD-10-CM

## 2019-03-19 PROCEDURE — 77427 RADIATION TX MANAGEMENT X5: CPT

## 2019-03-19 PROCEDURE — 77387B: CUSTOM | Mod: 26

## 2019-03-20 VITALS
HEIGHT: 72 IN | RESPIRATION RATE: 16 BRPM | DIASTOLIC BLOOD PRESSURE: 72 MMHG | OXYGEN SATURATION: 98 % | BODY MASS INDEX: 27.68 KG/M2 | HEART RATE: 98 BPM | SYSTOLIC BLOOD PRESSURE: 140 MMHG | WEIGHT: 204.37 LBS | TEMPERATURE: 98.4 F

## 2019-03-20 PROCEDURE — 77387B: CUSTOM | Mod: 26

## 2019-03-20 NOTE — REVIEW OF SYSTEMS
[Tinnitus - Grade 0] : Tinnitus - Grade 0 [Blurred Vision: Grade 0] : Blurred Vision: Grade 0 [Mucositis Oral: Grade 0] : Mucositis Oral: Grade 0  [Xerostomia: Grade 1 - Symptomatic (e.g., dry or thick saliva) without significant dietary alteration; unstimulated saliva flow >0.2 ml/min] : Xerostomia: Grade 1 - Symptomatic (e.g., dry or thick saliva) without significant dietary alteration; unstimulated saliva flow >0.2 ml/min [Oral Pain: Grade 1 - Mild pain] : Oral Pain: Grade 1 - Mild pain [Salivary duct inflammation: Grade 0] : Salivary duct inflammation: Grade 0 [Dysgeusia: Grade 0] : Dysgeusia: Grade 0 [Alopecia: Grade 0] : Alopecia: Grade 0 [Pruritus: Grade 0] : Pruritus: Grade 0 [Skin Atrophy: Grade 0] : Skin Atrophy: Grade 0 [Skin Hyperpigmentation: Grade 0] : Skin Hyperpigmentation: Grade 0 [Skin Induration: Grade 0] : Skin Induration: Grade 0 [Dermatitis Radiation: Grade 0] : Dermatitis Radiation: Grade 0

## 2019-03-20 NOTE — VITALS
[Maximal Pain Intensity: 4/10] : 4/10 [Least Pain Intensity: 0/10] : 0/10 [Pain Description/Quality: ___] : Pain description/quality: [unfilled] [Pain Duration: ___] : Pain duration: [unfilled] [Pain Location: ___] : Pain Location: [unfilled] [Pain Interferes with ADLs] : Pain interferes with activities of daily living. [NSAID/Non-Opioid] : NSAID/Non-Opioid [80: Normal activity with effort; some signs or symptoms of disease.] : 80: Normal activity with effort; some signs or symptoms of disease.  [ECOG Performance Status: 1 - Restricted in physically strenuous activity but ambulatory and able to carry out work of a light or sedentary nature] : Performance Status: 1 - Restricted in physically strenuous activity but ambulatory and able to carry out work of a light or sedentary nature, e.g., light house work, office work

## 2019-03-21 PROCEDURE — 77387B: CUSTOM | Mod: 26

## 2019-03-22 PROCEDURE — 77014: CPT | Mod: 26

## 2019-03-25 ENCOUNTER — APPOINTMENT (OUTPATIENT)
Dept: INFUSION THERAPY | Facility: HOSPITAL | Age: 79
End: 2019-03-25

## 2019-03-25 ENCOUNTER — LABORATORY RESULT (OUTPATIENT)
Age: 79
End: 2019-03-25

## 2019-03-25 ENCOUNTER — RESULT REVIEW (OUTPATIENT)
Age: 79
End: 2019-03-25

## 2019-03-25 ENCOUNTER — APPOINTMENT (OUTPATIENT)
Dept: HEMATOLOGY ONCOLOGY | Facility: CLINIC | Age: 79
End: 2019-03-25
Payer: MEDICARE

## 2019-03-25 VITALS
OXYGEN SATURATION: 97 % | DIASTOLIC BLOOD PRESSURE: 82 MMHG | SYSTOLIC BLOOD PRESSURE: 150 MMHG | HEART RATE: 88 BPM | RESPIRATION RATE: 18 BRPM | WEIGHT: 199.52 LBS | BODY MASS INDEX: 27.06 KG/M2 | TEMPERATURE: 98.4 F

## 2019-03-25 DIAGNOSIS — L27.0 GENERALIZED SKIN ERUPTION DUE TO DRUGS AND MEDICAMENTS TAKEN INTERNALLY: ICD-10-CM

## 2019-03-25 LAB
HCT VFR BLD CALC: 40.9 % — SIGNIFICANT CHANGE UP (ref 39–50)
HGB BLD-MCNC: 14.5 G/DL — SIGNIFICANT CHANGE UP (ref 13–17)
MCHC RBC-ENTMCNC: 31.3 PG — SIGNIFICANT CHANGE UP (ref 27–34)
MCHC RBC-ENTMCNC: 35.4 G/DL — SIGNIFICANT CHANGE UP (ref 32–36)
MCV RBC AUTO: 88.4 FL — SIGNIFICANT CHANGE UP (ref 80–100)
PLATELET # BLD AUTO: 169 K/UL — SIGNIFICANT CHANGE UP (ref 150–400)
RBC # BLD: 4.63 M/UL — SIGNIFICANT CHANGE UP (ref 4.2–5.8)
RBC # FLD: 12.5 % — SIGNIFICANT CHANGE UP (ref 10.3–14.5)
WBC # BLD: 6.8 K/UL — SIGNIFICANT CHANGE UP (ref 3.8–10.5)
WBC # FLD AUTO: 6.8 K/UL — SIGNIFICANT CHANGE UP (ref 3.8–10.5)

## 2019-03-25 PROCEDURE — 77387B: CUSTOM | Mod: 26

## 2019-03-25 PROCEDURE — 99213 OFFICE O/P EST LOW 20 MIN: CPT

## 2019-03-25 NOTE — REVIEW OF SYSTEMS
[Negative] : Allergic/Immunologic [Fatigue] : fatigue [Dysphagia] : dysphagia [Odynophagia] : odynophagia [Wheezing] : wheezing [SOB on Exertion] : shortness of breath during exertion [Skin Rash] : skin rash [Skin Wound] : skin wound [Difficulty Walking] : difficulty walking [Fever] : no fever [Chills] : no chills [Night Sweats] : no night sweats [Recent Change In Weight] : ~T no recent weight change [Eye Pain] : no eye pain [Red Eyes] : eyes not red [Dry Eyes] : no dryness of the eyes [Vision Problems] : no vision problems [Nosebleeds] : no nosebleeds [Hoarseness] : no hoarseness [Mucosal Pain] : no mucosal pain [Chest Pain] : no chest pain [Palpitations] : no palpitations [Leg Claudication] : no intermittent leg claudication [Lower Ext Edema] : no lower extremity edema [Shortness Of Breath] : no shortness of breath [Cough] : no cough [Abdominal Pain] : no abdominal pain [Vomiting] : no vomiting [Constipation] : no constipation [Diarrhea] : no diarrhea [Dysuria] : no dysuria [Incontinence] : no incontinence [Joint Pain] : no joint pain [Joint Stiffness] : no joint stiffness [Muscle Pain] : no muscle pain [Confused] : no confusion [Dizziness] : no dizziness [Fainting] : no fainting [Suicidal] : not suicidal [Insomnia] : no insomnia [Anxiety] : no anxiety [Depression] : no depression [Proptosis] : no proptosis [Hot Flashes] : no hot flashes [Muscle Weakness] : no muscle weakness [Deepening Of The Voice] : no deepening of the voice [Easy Bleeding] : no tendency for easy bleeding [Easy Bruising] : no tendency for easy bruising [Swollen Glands] : no swollen glands

## 2019-03-25 NOTE — ASSESSMENT
[Supportive] : Goals of care discussed with patient: Supportive [Palliative Care Plan] : not applicable at this time [FreeTextEntry1] : Kenneth Benjamin is a 79 year old male diagnosed with right cheek squamous cell carcinoma, currently on cycle # 6 of Erbitux. Due to his reported symptoms, he was advised to complete his scheduled treatments until 4/1/2019. He will remain on medications as discussed. Patient will complete radiation as directed. IV hydration was offered to assist with patient's decreased oral intake/dehydration; patient will let the office know by next week if he wishes to come in for additional appointments of IV fluids. Return to the office in 3 weeks. Seen and examined with Dr. Mikael Doll. \par

## 2019-03-25 NOTE — PHYSICAL EXAM
[Normal] : affect appropriate [Ambulatory and capable of all self care but unable to carry out any work activities] : Status 2- Ambulatory and capable of all self care but unable to carry out any work activities. 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

## 2019-03-26 DIAGNOSIS — R11.2 NAUSEA WITH VOMITING, UNSPECIFIED: ICD-10-CM

## 2019-03-26 DIAGNOSIS — Z51.11 ENCOUNTER FOR ANTINEOPLASTIC CHEMOTHERAPY: ICD-10-CM

## 2019-03-26 PROCEDURE — 77427 RADIATION TX MANAGEMENT X5: CPT

## 2019-03-26 PROCEDURE — 77387B: CUSTOM | Mod: 26

## 2019-03-27 VITALS — HEIGHT: 72 IN | RESPIRATION RATE: 16 BRPM | WEIGHT: 202 LBS | BODY MASS INDEX: 27.36 KG/M2

## 2019-03-27 PROCEDURE — 77387B: CUSTOM | Mod: 26

## 2019-03-27 NOTE — REVIEW OF SYSTEMS
[Dysphagia: Grade 0] : Dysphagia: Grade 0 [Tinnitus - Grade 0] : Tinnitus - Grade 0 [Blurred Vision: Grade 0] : Blurred Vision: Grade 0 [Mucositis Oral: Grade 0] : Mucositis Oral: Grade 0  [Xerostomia: Grade 1 - Symptomatic (e.g., dry or thick saliva) without significant dietary alteration; unstimulated saliva flow >0.2 ml/min] : Xerostomia: Grade 1 - Symptomatic (e.g., dry or thick saliva) without significant dietary alteration; unstimulated saliva flow >0.2 ml/min [Oral Pain: Grade 1 - Mild pain] : Oral Pain: Grade 1 - Mild pain [Salivary duct inflammation: Grade 0] : Salivary duct inflammation: Grade 0 [Dysgeusia: Grade 0] : Dysgeusia: Grade 0 [Alopecia: Grade 0] : Alopecia: Grade 0 [Pruritus: Grade 0] : Pruritus: Grade 0 [Skin Atrophy: Grade 0] : Skin Atrophy: Grade 0 [Skin Hyperpigmentation: Grade 1 - Hyperpigmentation covering <10% BSA; no psychosocial impact] : Skin Hyperpigmentation: Grade 1 - Hyperpigmentation covering <10% BSA; no psychosocial impact [Skin Induration: Grade 0] : Skin Induration: Grade 0 [Dermatitis Radiation: Grade 1 - Faint erythema or dry desquamation] : Dermatitis Radiation: Grade 1 - Faint erythema or dry desquamation

## 2019-03-28 PROCEDURE — 77387B: CUSTOM | Mod: 26

## 2019-03-29 PROCEDURE — 77014: CPT | Mod: 26

## 2019-04-01 ENCOUNTER — RESULT REVIEW (OUTPATIENT)
Age: 79
End: 2019-04-01

## 2019-04-01 ENCOUNTER — APPOINTMENT (OUTPATIENT)
Dept: INFUSION THERAPY | Facility: HOSPITAL | Age: 79
End: 2019-04-01

## 2019-04-01 LAB
HCT VFR BLD CALC: 42.4 % — SIGNIFICANT CHANGE UP (ref 39–50)
HGB BLD-MCNC: 15.1 G/DL — SIGNIFICANT CHANGE UP (ref 13–17)
MCHC RBC-ENTMCNC: 31.2 PG — SIGNIFICANT CHANGE UP (ref 27–34)
MCHC RBC-ENTMCNC: 35.6 G/DL — SIGNIFICANT CHANGE UP (ref 32–36)
MCV RBC AUTO: 87.6 FL — SIGNIFICANT CHANGE UP (ref 80–100)
PLATELET # BLD AUTO: 151 K/UL — SIGNIFICANT CHANGE UP (ref 150–400)
RBC # BLD: 4.84 M/UL — SIGNIFICANT CHANGE UP (ref 4.2–5.8)
RBC # FLD: 12.7 % — SIGNIFICANT CHANGE UP (ref 10.3–14.5)
WBC # BLD: 8 K/UL — SIGNIFICANT CHANGE UP (ref 3.8–10.5)
WBC # FLD AUTO: 8 K/UL — SIGNIFICANT CHANGE UP (ref 3.8–10.5)

## 2019-04-01 PROCEDURE — 77387B: CUSTOM | Mod: 26

## 2019-04-02 PROCEDURE — 77387B: CUSTOM | Mod: 26

## 2019-04-02 PROCEDURE — 77427 RADIATION TX MANAGEMENT X5: CPT

## 2019-04-03 VITALS — WEIGHT: 198 LBS | HEIGHT: 72 IN | RESPIRATION RATE: 16 BRPM | BODY MASS INDEX: 26.82 KG/M2

## 2019-04-03 DIAGNOSIS — L30.9 DERMATITIS, UNSPECIFIED: ICD-10-CM

## 2019-04-03 PROCEDURE — 77387B: CUSTOM | Mod: 26

## 2019-04-03 RX ORDER — SILVER SULFADIAZINE 10 MG/G
1 CREAM TOPICAL TWICE DAILY
Qty: 1 | Refills: 1 | Status: ACTIVE | COMMUNITY
Start: 2019-04-03 | End: 1900-01-01

## 2019-04-03 NOTE — REVIEW OF SYSTEMS
[Dysphagia: Grade 0] : Dysphagia: Grade 0 [Tinnitus - Grade 0] : Tinnitus - Grade 0 [Blurred Vision: Grade 0] : Blurred Vision: Grade 0 [Mucositis Oral: Grade 0] : Mucositis Oral: Grade 0  [Xerostomia: Grade 1 - Symptomatic (e.g., dry or thick saliva) without significant dietary alteration; unstimulated saliva flow >0.2 ml/min] : Xerostomia: Grade 1 - Symptomatic (e.g., dry or thick saliva) without significant dietary alteration; unstimulated saliva flow >0.2 ml/min [Oral Pain: Grade 1 - Mild pain] : Oral Pain: Grade 1 - Mild pain [Salivary duct inflammation: Grade 0] : Salivary duct inflammation: Grade 0 [Dysgeusia: Grade 1- Altered taste but no change in diet] : Dysgeusia: Grade 1 - Altered taste but no change in diet [Alopecia: Grade 0] : Alopecia: Grade 0 [Pruritus: Grade 0] : Pruritus: Grade 0 [Skin Atrophy: Grade 0] : Skin Atrophy: Grade 0 [Skin Hyperpigmentation: Grade 2 - Hyperpigmentation covering >10% BSA; associated psychosocial impact] : Skin Hyperpigmentation: Grade 2 - Hyperpigmentation covering >10% BSA; associated psychosocial impact [Skin Induration: Grade 0] : Skin Induration: Grade 0 [Dermatitis Radiation: Grade 2 - Moderate to brisk erythema; patchy moist desquamation, mostly confined to skin folds and creases; moderate edema] : Dermatitis Radiation: Grade 2 - Moderate to brisk erythema; patchy moist desquamation, mostly confined to skin folds and creases; moderate edema

## 2019-04-04 PROCEDURE — 77387B: CUSTOM | Mod: 26

## 2019-04-05 PROCEDURE — 77014: CPT | Mod: 26

## 2019-04-08 ENCOUNTER — APPOINTMENT (OUTPATIENT)
Dept: INFUSION THERAPY | Facility: HOSPITAL | Age: 79
End: 2019-04-08

## 2019-04-08 PROCEDURE — 77387B: CUSTOM | Mod: 26

## 2019-04-09 PROCEDURE — 77387B: CUSTOM | Mod: 26

## 2019-04-09 PROCEDURE — 77427 RADIATION TX MANAGEMENT X5: CPT

## 2019-04-10 VITALS — WEIGHT: 193.01 LBS | BODY MASS INDEX: 26.18 KG/M2

## 2019-04-10 PROCEDURE — 77387B: CUSTOM | Mod: 26

## 2019-04-10 RX ORDER — GABAPENTIN 100 MG/1
100 CAPSULE ORAL 3 TIMES DAILY
Qty: 60 | Refills: 0 | Status: DISCONTINUED | COMMUNITY
Start: 2019-03-13 | End: 2019-04-10

## 2019-04-10 NOTE — VITALS
[Pain Description/Quality: ___] : Pain description/quality: [unfilled] [Maximal Pain Intensity: 4/10] : 4/10 [Least Pain Intensity: 0/10] : 0/10 [Pain Location: ___] : Pain Location: [unfilled] [Pain Duration: ___] : Pain duration: [unfilled] [Pain Interferes with ADLs] : Pain interferes with activities of daily living. [NSAID/Non-Opioid] : NSAID/Non-Opioid [80: Normal activity with effort; some signs or symptoms of disease.] : 80: Normal activity with effort; some signs or symptoms of disease.  [ECOG Performance Status: 1 - Restricted in physically strenuous activity but ambulatory and able to carry out work of a light or sedentary nature] : Performance Status: 1 - Restricted in physically strenuous activity but ambulatory and able to carry out work of a light or sedentary nature, e.g., light house work, office work

## 2019-04-10 NOTE — REVIEW OF SYSTEMS
[Dysphagia: Grade 0] : Dysphagia: Grade 0 [Tinnitus - Grade 0] : Tinnitus - Grade 0 [Blurred Vision: Grade 0] : Blurred Vision: Grade 0 [Mucositis Oral: Grade 0] : Mucositis Oral: Grade 0  [Xerostomia: Grade 2 - Moderate symptoms; oral intake alterations (e.g., copious water, other lubricants, diet limited to purees and/or soft, moist foods); unstimulated saliva 0.1 to 0.2 ml/min] : Xerostomia: Grade 2 - Moderate symptoms; oral intake alterations (e.g., copious water, other lubricants, diet limited to purees and/or soft, moist foods); unstimulated saliva 0.1 to 0.2 ml/min [Oral Pain: Grade 1 - Mild pain] : Oral Pain: Grade 1 - Mild pain [Salivary duct inflammation: Grade 0] : Salivary duct inflammation: Grade 0 [Dysgeusia: Grade 1- Altered taste but no change in diet] : Dysgeusia: Grade 1 - Altered taste but no change in diet [Pruritus: Grade 0] : Pruritus: Grade 0 [Alopecia: Grade 0] : Alopecia: Grade 0 [Skin Atrophy: Grade 0] : Skin Atrophy: Grade 0 [Skin Hyperpigmentation: Grade 2 - Hyperpigmentation covering >10% BSA; associated psychosocial impact] : Skin Hyperpigmentation: Grade 2 - Hyperpigmentation covering >10% BSA; associated psychosocial impact [Skin Induration: Grade 0] : Skin Induration: Grade 0 [Dermatitis Radiation: Grade 2 - Moderate to brisk erythema; patchy moist desquamation, mostly confined to skin folds and creases; moderate edema] : Dermatitis Radiation: Grade 2 - Moderate to brisk erythema; patchy moist desquamation, mostly confined to skin folds and creases; moderate edema

## 2019-04-11 PROCEDURE — 77387B: CUSTOM | Mod: 26

## 2019-04-12 PROCEDURE — 77014: CPT | Mod: 26

## 2019-04-15 PROCEDURE — 77387B: CUSTOM | Mod: 26

## 2019-04-26 ENCOUNTER — OUTPATIENT (OUTPATIENT)
Dept: OUTPATIENT SERVICES | Facility: HOSPITAL | Age: 79
LOS: 1 days | Discharge: HOME | End: 2019-04-26

## 2019-04-26 DIAGNOSIS — C44.321 SQUAMOUS CELL CARCINOMA OF SKIN OF NOSE: ICD-10-CM

## 2019-04-30 PROBLEM — I77.1 ARTERIAL INSUFFICIENCY: Status: ACTIVE | Noted: 2017-03-08

## 2019-04-30 PROBLEM — E11.51 DIABETES MELLITUS WITH PERIPHERAL CIRCULATORY DISORDER: Status: ACTIVE | Noted: 2019-01-30

## 2019-05-01 ENCOUNTER — APPOINTMENT (OUTPATIENT)
Dept: HEMATOLOGY ONCOLOGY | Facility: CLINIC | Age: 79
End: 2019-05-01
Payer: MEDICARE

## 2019-05-01 ENCOUNTER — RESULT REVIEW (OUTPATIENT)
Age: 79
End: 2019-05-01

## 2019-05-01 VITALS
OXYGEN SATURATION: 100 % | BODY MASS INDEX: 25.56 KG/M2 | HEART RATE: 105 BPM | SYSTOLIC BLOOD PRESSURE: 145 MMHG | RESPIRATION RATE: 16 BRPM | WEIGHT: 188.47 LBS | TEMPERATURE: 97.7 F | DIASTOLIC BLOOD PRESSURE: 79 MMHG

## 2019-05-01 DIAGNOSIS — I77.1 STRICTURE OF ARTERY: ICD-10-CM

## 2019-05-01 DIAGNOSIS — E11.51 TYPE 2 DIABETES MELLITUS WITH DIABETIC PERIPHERAL ANGIOPATHY W/OUT GANGRENE: ICD-10-CM

## 2019-05-01 LAB
ALBUMIN SERPL ELPH-MCNC: 3.8 G/DL
ALP BLD-CCNC: 86 U/L
ALT SERPL-CCNC: 26 U/L
ANION GAP SERPL CALC-SCNC: 15 MMOL/L
AST SERPL-CCNC: 31 U/L
BILIRUB SERPL-MCNC: 0.4 MG/DL
BUN SERPL-MCNC: 24 MG/DL
CALCIUM SERPL-MCNC: 10.3 MG/DL
CHLORIDE SERPL-SCNC: 100 MMOL/L
CO2 SERPL-SCNC: 27 MMOL/L
CREAT SERPL-MCNC: 1.42 MG/DL
GLUCOSE SERPL-MCNC: 181 MG/DL
HCT VFR BLD CALC: 38.6 % — LOW (ref 39–50)
HGB BLD-MCNC: 13.3 G/DL — SIGNIFICANT CHANGE UP (ref 13–17)
MAGNESIUM SERPL-MCNC: 1.5 MG/DL
MCHC RBC-ENTMCNC: 30.5 PG — SIGNIFICANT CHANGE UP (ref 27–34)
MCHC RBC-ENTMCNC: 34.4 G/DL — SIGNIFICANT CHANGE UP (ref 32–36)
MCV RBC AUTO: 88.8 FL — SIGNIFICANT CHANGE UP (ref 80–100)
PLATELET # BLD AUTO: 130 K/UL — LOW (ref 150–400)
POTASSIUM SERPL-SCNC: 5.2 MMOL/L
PROT SERPL-MCNC: 6.7 G/DL
RBC # BLD: 4.35 M/UL — SIGNIFICANT CHANGE UP (ref 4.2–5.8)
RBC # FLD: 13.1 % — SIGNIFICANT CHANGE UP (ref 10.3–14.5)
SODIUM SERPL-SCNC: 142 MMOL/L
WBC # BLD: 5.9 K/UL — SIGNIFICANT CHANGE UP (ref 3.8–10.5)
WBC # FLD AUTO: 5.9 K/UL — SIGNIFICANT CHANGE UP (ref 3.8–10.5)

## 2019-05-01 PROCEDURE — 99214 OFFICE O/P EST MOD 30 MIN: CPT

## 2019-05-01 RX ORDER — MUPIROCIN 20 MG/G
2 OINTMENT TOPICAL
Qty: 1 | Refills: 2 | Status: ACTIVE | COMMUNITY
Start: 2019-05-01 | End: 1900-01-01

## 2019-05-01 RX ORDER — CHLORHEXIDINE GLUCONATE 4 %
400 (240 MG) LIQUID (ML) TOPICAL
Qty: 60 | Refills: 5 | Status: ACTIVE | COMMUNITY
Start: 2019-05-01 | End: 1900-01-01

## 2019-05-01 NOTE — REVIEW OF SYSTEMS
[Fatigue] : fatigue [Palpitations] : palpitations [Joint Pain] : joint pain [Joint Stiffness] : joint stiffness [Skin Wound] : skin wound [Difficulty Walking] : difficulty walking [Negative] : Allergic/Immunologic [de-identified] : scalp scabs [de-identified] : loses balance

## 2019-05-01 NOTE — ASSESSMENT
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 The patient has completed cetuximab and radiation receiving a dose of 7000 cGy. He continues to recover from treatment related side effects, his fatigue is believed to be due more from his atrial fibrillation; follow-up with primary care as directed. Review of medications: magnesium and mupirocin ointment was prescribed. The patient will see Dr Portillo for endoscopy in June 6 2019. Return to the office in 2 months. Seen and examined with Donald MCGHEE. \par

## 2019-05-01 NOTE — HISTORY OF PRESENT ILLNESS
[Disease: _____________________] : Disease: [unfilled] [T: ___] : T[unfilled] [N: ___] : N[unfilled] [M: ___] : M[unfilled] [AJCC Stage: ____] : AJCC Stage: [unfilled] [Treatment Protocol] : Treatment Protocol [Cardiovascular] : Cardiovascular [Constitutional] : Constitutional [ENT] : ENT [Dermatologic] : Dermatologic [Endocrine] : Endocrine [Gastrointestinal] : Gastrointestinal [Genitourinary] : Genitourinary [Gynecologic] : Gynecologic [Infectious] : Infectious [Musculoskeletal] : Musculoskeletal [Neurologic] : Neurologic [Pain] : Pain [Pulmonary] : Pulmonary [Hematologic] : Hematologic [Date: ____________] : Patient's last distress assessment performed on [unfilled]. [1 - Distress Level] : Distress Level: 1 [de-identified] : First visit January 2019. The patient noted a mass in the right side of his neck in November 2018; he described the mass as increasing and decreasing in size. There was  no weight loss. The mass does not cause pain.\par He visited Dr Santo Reveles of [place] who referred him for a biopsy of the right neck mass. The biopsy results were interpreted a suspicious for malignancy.\par He was referred to Dr Otilio Portillo who preformed an additional biopsy.A CT of the neck and a PET/CT were performed on December 18 2018.\par Imaging results showed a 2.2 cm X 2.1 cm X 2.6  cm lesion in the right neck superior to the right parotid. There was additional small lymphadenopathy on the contralateral side. CT/PET revealed hypermetabolism over the right neck mass and at the base of the tongue. 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 \par He has a history of skin cancer on his right cheek face (3 or 4 years ago) and arms (different times in the past 5 years).\par He had 10 Mohs surgeries at various times and locations in his body. \par He has 40 year history of type 2 diabetes (for which he receives insulin daily) and peripheral vascular disease.He had right toe gangrene resulting in amputation of the right fourth toe in 2016.\par he has an abdominal aortic aneurysm\par He had ARV open surgery approximately 10 years ago with concurrent CABG X 3. Neither the patient or family members recollect the exact year [FreeTextEntry1] : weekly cetuximab and radiotherapy 70 [place] [de-identified] : squamous cell carcinoma;  [de-identified] : HPV, p 16 undetermined : too few cells for analysis [de-identified] : The patient has completed treatment with Dr Duncan two weeks ago. He tolerated treatment well.\par The patient had seen Dr Reveles and he had an ECG

## 2019-05-01 NOTE — PHYSICAL EXAM
[Capable of only limited self care, confined to bed or chair more than 50% of waking hours] : Status 3- Capable of only limited self care, confined to bed or chair more than 50% of waking hours [Normal] : full range of motion and no deformities appreciated [Mucositis] : no mucositis [Ulcers] : no ulcers [Thrush] : no thrush [de-identified] : elderly  [de-identified] : bilateral cataracts [de-identified] : right sub mental mass level 1 ; 2 cm X 2 cm [de-identified] : right 4 th toe amputation [de-identified] : seborrhea keratosis over scalp back,chest  [de-identified] : forgetful

## 2019-05-06 NOTE — DISEASE MANAGEMENT
[Clinical] : TNM Stage: c [I] : I [TTNM] : 1 [NTNM] : 1 [MTNM] : 0 [de-identified] : 7000 cGy [de-identified] : base of tongue

## 2019-05-06 NOTE — HISTORY OF PRESENT ILLNESS
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 16 and HPV. He is undergoing chemoradiation with Erbitux. \par \par 12/35 fractions today. c/o some blood in sputum at times. Pain in throat. Will give gabapentin. Pt declined magic mouthwash which gives him burning in mouth. On chemotherapy with Dr. Doll. He has stable xerostomia and has started marshmallow root. Eating well.

## 2019-05-10 NOTE — HISTORY OF PRESENT ILLNESS
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 16 and HPV. He is undergoing chemoradiation with Erbitux. \par \par 17/35 fractions today. c/o some blood in sputum at times. Pain in throat. Pt stopped gabapentin due to dizziness. Will take Advil PRN for now.  Pt declined magic mouthwash which gives him burning in mouth. On chemotherapy with Dr. Doll. He has stable xerostomia and has started marshmallow root. Eating well.

## 2019-05-10 NOTE — HISTORY OF PRESENT ILLNESS
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 16 and HPV. He is undergoing chemoradiation with Erbitux. \par \par 27/35 fractions today. c/o some blood in sputum at times. Pain in throat. Pt stopped gabapentin due to dizziness. Will take Advil PRN for now.  Pt declined magic mouthwash which gives him burning in mouth. On chemotherapy with Dr. Doll. Eating well. Using Aquaphor for skin reaction

## 2019-05-10 NOTE — HISTORY OF PRESENT ILLNESS
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 16 and HPV. He is undergoing chemoradiation with Erbitux. \par \par 27/35 fractions today. c/o some blood in sputum at times. Pain in throat. Pt stopped gabapentin due to dizziness. On Advil PRN for now.  Pt declined magic mouthwash which gives him burning in mouth. On chemotherapy with Dr. Doll. Eating well. Using Aquaphor/Silvadene cream for skin reaction

## 2019-05-10 NOTE — HISTORY OF PRESENT ILLNESS
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 16 and HPV. He is undergoing chemoradiation with Erbitux. \par \par 22/35 fractions today. c/o some blood in sputum at times. Pain in throat. Pt stopped gabapentin due to dizziness. Will take Advil PRN for now.  Pt declined magic mouthwash which gives him burning in mouth. On chemotherapy with Dr. Doll. Eating well.

## 2019-05-10 NOTE — DISEASE MANAGEMENT
[Clinical] : TNM Stage: c [I] : I [TTNM] : 1 [MTNM] : 0 [NTNM] : 1 [de-identified] : 7000 cGy [de-identified] : base of tongue

## 2019-05-10 NOTE — DISEASE MANAGEMENT
[Clinical] : TNM Stage: c [I] : I [MTNM] : 0 [TTNM] : 1 [NTNM] : 1 [de-identified] : base of tongue [de-identified] : 7000 cGy

## 2019-05-10 NOTE — DISEASE MANAGEMENT
[Clinical] : TNM Stage: c [I] : I [NTNM] : 1 [MTNM] : 0 [de-identified] : 7000 cGy [TTNM] : 1 [de-identified] : base of tongue

## 2019-05-23 ENCOUNTER — APPOINTMENT (OUTPATIENT)
Dept: RADIATION ONCOLOGY | Facility: CLINIC | Age: 79
End: 2019-05-23
Payer: MEDICARE

## 2019-05-23 VITALS
RESPIRATION RATE: 15 BRPM | HEART RATE: 79 BPM | DIASTOLIC BLOOD PRESSURE: 69 MMHG | OXYGEN SATURATION: 98 % | BODY MASS INDEX: 25.77 KG/M2 | TEMPERATURE: 98.6 F | SYSTOLIC BLOOD PRESSURE: 116 MMHG | WEIGHT: 190.04 LBS

## 2019-05-23 VITALS — SYSTOLIC BLOOD PRESSURE: 103 MMHG | DIASTOLIC BLOOD PRESSURE: 67 MMHG | HEART RATE: 65 BPM

## 2019-05-23 PROCEDURE — 99024 POSTOP FOLLOW-UP VISIT: CPT

## 2019-05-23 NOTE — REASON FOR VISIT
[Routine Follow-Up] : routine follow-up visit for [Head and Neck Cancer] : head and neck cancer [Spouse] : spouse

## 2019-05-23 NOTE — VITALS
[Least Pain Intensity: 0/10] : 0/10 [Pain Description/Quality: ___] : Pain description/quality: [unfilled] [Pain Duration: ___] : Pain duration: [unfilled] [Pain Location: ___] : Pain Location: [unfilled] [Pain Interferes with ADLs] : Pain interferes with activities of daily living. [NSAID/Non-Opioid] : NSAID/Non-Opioid [80: Normal activity with effort; some signs or symptoms of disease.] : 80: Normal activity with effort; some signs or symptoms of disease.  [ECOG Performance Status: 1 - Restricted in physically strenuous activity but ambulatory and able to carry out work of a light or sedentary nature] : Performance Status: 1 - Restricted in physically strenuous activity but ambulatory and able to carry out work of a light or sedentary nature, e.g., light house work, office work [Maximal Pain Intensity: 0/10] : 0/10

## 2019-05-28 NOTE — REVIEW OF SYSTEMS
[Dysphagia: Grade 0] : Dysphagia: Grade 0 [Tinnitus - Grade 0] : Tinnitus - Grade 0 [Blurred Vision: Grade 0] : Blurred Vision: Grade 0 [Mucositis Oral: Grade 0] : Mucositis Oral: Grade 0  [Xerostomia: Grade 2 - Moderate symptoms; oral intake alterations (e.g., copious water, other lubricants, diet limited to purees and/or soft, moist foods); unstimulated saliva 0.1 to 0.2 ml/min] : Xerostomia: Grade 2 - Moderate symptoms; oral intake alterations (e.g., copious water, other lubricants, diet limited to purees and/or soft, moist foods); unstimulated saliva 0.1 to 0.2 ml/min [Oral Pain: Grade 1 - Mild pain] : Oral Pain: Grade 1 - Mild pain [Salivary duct inflammation: Grade 0] : Salivary duct inflammation: Grade 0 [Dysgeusia: Grade 1- Altered taste but no change in diet] : Dysgeusia: Grade 1 - Altered taste but no change in diet [Alopecia: Grade 0] : Alopecia: Grade 0 [Pruritus: Grade 0] : Pruritus: Grade 0 [Skin Atrophy: Grade 0] : Skin Atrophy: Grade 0 [Skin Hyperpigmentation: Grade 2 - Hyperpigmentation covering >10% BSA; associated psychosocial impact] : Skin Hyperpigmentation: Grade 2 - Hyperpigmentation covering >10% BSA; associated psychosocial impact [Skin Induration: Grade 0] : Skin Induration: Grade 0 [Dermatitis Radiation: Grade 2 - Moderate to brisk erythema; patchy moist desquamation, mostly confined to skin folds and creases; moderate edema] : Dermatitis Radiation: Grade 2 - Moderate to brisk erythema; patchy moist desquamation, mostly confined to skin folds and creases; moderate edema [Dizziness] : dizziness [Difficulty Walking] : difficulty walking [Negative] : Musculoskeletal [Dysphagia] : no dysphagia [Loss of Hearing] : no loss of hearing [FreeTextEntry3] : sensitive to light [de-identified] : notes brown spots to head since radiation

## 2019-05-28 NOTE — PHYSICAL EXAM
[Heart Sounds] : normal S1 and S2 [Normal] : no focal deficits [de-identified] : dryness to tongue [de-identified] : brown spots to head

## 2019-05-28 NOTE — HISTORY OF PRESENT ILLNESS
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 16 and HPV. \par He completed 7000 cgy of radiation 2/22/19-4/15/19 in conjunction with chemotherapy.\par \par Today he feels well. His taste remains decreased. Mouth is dry. No trouble swallowing, no pain with swallowing. has noticed some dizziness when standing. Also has some brown spots to his head which have started coming off.\par

## 2019-06-06 ENCOUNTER — APPOINTMENT (OUTPATIENT)
Dept: SURGERY | Facility: CLINIC | Age: 79
End: 2019-06-06
Payer: MEDICARE

## 2019-06-06 PROCEDURE — 31575 DIAGNOSTIC LARYNGOSCOPY: CPT

## 2019-06-06 PROCEDURE — 99213 OFFICE O/P EST LOW 20 MIN: CPT | Mod: 25

## 2019-06-06 NOTE — PHYSICAL EXAM
[de-identified] : no palpable thyroid nodules [Nasal Endoscopy Performed] : nasal endoscopy was performed, see procedure section for findings [Laryngoscopy Performed] : laryngoscopy was performed, see procedure section for findings [Midline] : located in midline position [Normal] : extraocular movements are normal [FreeTextEntry2] : right cheek scar overlying parotid.  multiple other facial scars [de-identified] : 2.5 cm right upper jugular mass at tail of parotid, well circumscribed and mobile

## 2019-06-06 NOTE — HISTORY OF PRESENT ILLNESS
[de-identified] : 6 weeks s/p chemo/RT treatment of base of tongue ca with metastatic adenopathy.    denies pain, bleeding, dysphagia, hoarseness or new lesions. 30 lb weight loss has stabilized

## 2019-06-06 NOTE — PROCEDURE
[None] : none [Flexible Endoscope] : examined with the flexible endoscope [Normal] : the epiglottis was regular without inflammation, lesions or masses, with regular aryepiglottic folds, and a smooth petiolus [Lesion(s)] : no lesions

## 2019-06-06 NOTE — ASSESSMENT
[FreeTextEntry1] : neck CT requested.  to call next week for results.  PET scan next visit. to return earlier if any change

## 2019-06-20 ENCOUNTER — OTHER (OUTPATIENT)
Age: 79
End: 2019-06-20

## 2019-06-25 ENCOUNTER — APPOINTMENT (OUTPATIENT)
Dept: SURGERY | Facility: CLINIC | Age: 79
End: 2019-06-25
Payer: MEDICARE

## 2019-06-25 ENCOUNTER — LABORATORY RESULT (OUTPATIENT)
Age: 79
End: 2019-06-25

## 2019-06-25 DIAGNOSIS — C44.329 SQUAMOUS CELL CARCINOMA OF SKIN OF OTHER PARTS OF FACE: ICD-10-CM

## 2019-06-25 PROCEDURE — 99213 OFFICE O/P EST LOW 20 MIN: CPT

## 2019-06-25 PROCEDURE — 10021 FNA BX W/O IMG GDN 1ST LES: CPT

## 2019-06-25 NOTE — PHYSICAL EXAM
[de-identified] : no palpable thyroid nodules [Nasal Endoscopy Performed] : nasal endoscopy was performed, see procedure section for findings [Midline] : located in midline position [de-identified] : 2.5 cm right upper jugular mass at tail of parotid, well circumscribed and mobile [Normal] : orientation to person, place, and time: normal [FreeTextEntry2] : right cheek scar overlying parotid.  multiple other facial scars

## 2019-06-25 NOTE — ASSESSMENT
[FreeTextEntry1] : fine needle aspiration cytology performed yielding 8 cc yellow fluid with complete collapse.   to call next week for results.  PET scan next visit.

## 2019-06-25 NOTE — HISTORY OF PRESENT ILLNESS
[de-identified] : 9  weeks s/p chemo/RT treatment of base of tongue ca with metastatic adenopathy.    denies pain, bleeding, dysphagia, hoarseness or new lesions. 30 lb weight loss has stabilized.  noted to have right upper jugular node

## 2019-06-26 NOTE — HISTORY OF PRESENT ILLNESS
Patient presents today for Rituxan infusion  Patient offers no complaints  VSS  No lab parameters ordered  Peripheral IV inserted without incident  [Disease: _____________________] : Disease: [unfilled] [T: ___] : T[unfilled] [N: ___] : N[unfilled] [M: ___] : M[unfilled] [AJCC Stage: ____] : AJCC Stage: [unfilled] [Treatment Protocol] : Treatment Protocol [Cardiovascular] : Cardiovascular [Constitutional] : Constitutional [ENT] : ENT [Dermatologic] : Dermatologic [Endocrine] : Endocrine [Gastrointestinal] : Gastrointestinal [Genitourinary] : Genitourinary [Gynecologic] : Gynecologic [Infectious] : Infectious [Musculoskeletal] : Musculoskeletal [Neurologic] : Neurologic [Pain] : Pain [Pulmonary] : Pulmonary [Hematologic] : Hematologic [___________________________________] : Drug: [unfilled] [de-identified] : The patient first noted a lump in the right side of his neck in November 2018; he described the mass as increasing in size. There was no associated weight loss. The mass does not cause pain. He eventually visited Dr Santo Reveles of [place] who referred him for a biopsy of the right neck mass. The biopsy results were interpreted a suspicious for malignancy. He was subsequently referred to Dr Otilio Portillo (surgery), who preformed an additional biopsy.A CT of the neck and a PET/CT were performed on December 18 2018. Imaging results showed a 2.2 cm X 2.1 cm X 2.6  cm lesion in the right neck superior to the right parotid. There was additional small lymphadenopathy on the contralateral side. CT/PET revealed hypermetabolism over the right neck mass and at the base of the tongue. There was no corresponding CT or physical examination finding of the base of the tongue lesion. 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 \par Patient noted to have a history of skin cancer on his right cheek face (3 or 4 years ago) and arms (different times in the past 5 years).\par He had 10 Mohs surgeries at various times and locations in his body. \par He has 40 year history of type 2 diabetes (for which he receives insulin daily) and peripheral vascular disease.He had right toe gangrene resulting in amputation of the right fourth toe in 2016.\par he has an abdominal aortic aneurysm\par He had ARV open surgery approximately 10 years ago with concurrent CABG X 3. Neither the patient or family members recollect the exact year [de-identified] : squamous cell carcinoma;  [de-identified] : HPV, p 16 undetermined : too few cells for analysis [FreeTextEntry1] : Erbitux cycle # 6 today [de-identified] : Patient presented to the office for routine follow-up visit. His skin related side effects appear to be better controlled with the use of oral antibiotic and moisturizer. Patient's wife noted increased fatigue and decreased oral intake for the past 2-3 weeks, more of a sedentary lifestyle as a result. Patient also admitted he slipped and fell out of bed earlier today, scrape was noted on his forehead.  [de-identified] : acne-like rash, dry skin, skin peeling of bilateral hands

## 2019-07-09 ENCOUNTER — OTHER (OUTPATIENT)
Age: 79
End: 2019-07-09

## 2019-07-23 ENCOUNTER — APPOINTMENT (OUTPATIENT)
Dept: SURGERY | Facility: CLINIC | Age: 79
End: 2019-07-23
Payer: MEDICARE

## 2019-07-23 PROCEDURE — 31575 DIAGNOSTIC LARYNGOSCOPY: CPT

## 2019-07-23 PROCEDURE — 99213 OFFICE O/P EST LOW 20 MIN: CPT | Mod: 25

## 2019-07-23 NOTE — ASSESSMENT
[FreeTextEntry1] : PET scan requested.    to call next week for results.  to return earlier if any change

## 2019-07-23 NOTE — HISTORY OF PRESENT ILLNESS
[de-identified] :  3  months  s/p chemo/RT treatment of base of tongue ca with metastatic adenopathy.    denies pain, bleeding, dysphagia, hoarseness or new lesions. notes additional  weight loss .  noted to have right upper jugular node  which was biopsied  with atypical findings  but has not refilled.

## 2019-07-23 NOTE — PHYSICAL EXAM
[de-identified] : no palpable thyroid nodules [Nasal Endoscopy Performed] : nasal endoscopy was performed, see procedure section for findings [Laryngoscopy Performed] : laryngoscopy was performed, see procedure section for findings [Midline] : located in midline position [Normal] : no neck adenopathy [FreeTextEntry2] : right cheek scar overlying parotid.  multiple other facial scars

## 2019-07-31 ENCOUNTER — FORM ENCOUNTER (OUTPATIENT)
Age: 79
End: 2019-07-31

## 2019-08-01 ENCOUNTER — APPOINTMENT (OUTPATIENT)
Dept: NUCLEAR MEDICINE | Facility: IMAGING CENTER | Age: 79
End: 2019-08-01
Payer: MEDICARE

## 2019-08-01 ENCOUNTER — OUTPATIENT (OUTPATIENT)
Dept: OUTPATIENT SERVICES | Facility: HOSPITAL | Age: 79
LOS: 1 days | End: 2019-08-01
Payer: MEDICARE

## 2019-08-01 DIAGNOSIS — R59.0 LOCALIZED ENLARGED LYMPH NODES: ICD-10-CM

## 2019-08-01 PROCEDURE — 78815 PET IMAGE W/CT SKULL-THIGH: CPT | Mod: 26,PS

## 2019-08-01 PROCEDURE — A9552: CPT

## 2019-08-01 PROCEDURE — 78815 PET IMAGE W/CT SKULL-THIGH: CPT

## 2019-08-06 ENCOUNTER — OTHER (OUTPATIENT)
Age: 79
End: 2019-08-06

## 2019-08-09 ENCOUNTER — RESULT REVIEW (OUTPATIENT)
Age: 79
End: 2019-08-09

## 2019-08-16 ENCOUNTER — APPOINTMENT (OUTPATIENT)
Dept: RADIATION ONCOLOGY | Facility: CLINIC | Age: 79
End: 2019-08-16
Payer: MEDICARE

## 2019-08-16 VITALS
HEART RATE: 83 BPM | BODY MASS INDEX: 23.84 KG/M2 | SYSTOLIC BLOOD PRESSURE: 169 MMHG | DIASTOLIC BLOOD PRESSURE: 78 MMHG | HEIGHT: 72 IN | WEIGHT: 176 LBS | RESPIRATION RATE: 16 BRPM

## 2019-08-16 PROCEDURE — 99214 OFFICE O/P EST MOD 30 MIN: CPT

## 2019-08-19 NOTE — PHYSICAL EXAM
[Normal] : oriented to person, place and time, the affect was normal, the mood was normal and not anxious [de-identified] : left neck node palpable  [de-identified] : left neck node palpable [de-identified] : As above

## 2019-08-19 NOTE — HISTORY OF PRESENT ILLNESS
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 16 and HPV. \par He completed 7000 cgy of radiation 2/22/19-4/15/19 in conjunction with chemotherapy.\par \par Today he feels well. His taste remains decreased. Mouth is dry. No trouble swallowing, no pain with swallowing. has noticed some dizziness when standing. Advised to see PCP for his dizziness. May see physical therapy service for neck swelling. ENT service will do neck biopsy in 2 weeks. \par \par PET scan in 8/2019 IMPRESSION: Abnormal FDG-PET/CT scan. \par 1. Resolution of asymmetric, mild hypermetabolism in right base as compared to prior study dated 12/18/2018. \par 2. Predominantly necrotic right level II cervical lymph node, decreased in size and metabolism as compared to prior study, with indeterminate foci of mild FDG activity in superior and inferior aspects of the node. Further evaluation may be performed with ultrasound and percutaneous needle biopsy. \par 3. Minimally FDG-avid opacity, superior segment of right lower lobe, unchanged since 12/18/2018, is indeterminate, possibly scarring. \par 4. Nonspecific focal hypermetabolism in ileocecal region, not significantly changed. Differential diagnosis includes a polyp. Please correlate with colonoscopy. \par 5. Interval decrease in minimally FDG-avid subcutaneous soft tissue stranding and skin thickening, left lower anterior abdominal wall. \par 6. No new lesion. \par \par

## 2019-08-19 NOTE — REVIEW OF SYSTEMS
[Dysphagia] : no dysphagia [Loss of Hearing] : no loss of hearing [FreeTextEntry3] : sensitive to light [de-identified] : notes brown spots to head since radiation

## 2019-08-26 ENCOUNTER — FORM ENCOUNTER (OUTPATIENT)
Age: 79
End: 2019-08-26

## 2019-08-27 ENCOUNTER — APPOINTMENT (OUTPATIENT)
Dept: ULTRASOUND IMAGING | Facility: IMAGING CENTER | Age: 79
End: 2019-08-27
Payer: MEDICARE

## 2019-08-27 ENCOUNTER — OUTPATIENT (OUTPATIENT)
Dept: OUTPATIENT SERVICES | Facility: HOSPITAL | Age: 79
LOS: 1 days | End: 2019-08-27
Payer: MEDICARE

## 2019-08-27 ENCOUNTER — RESULT REVIEW (OUTPATIENT)
Age: 79
End: 2019-08-27

## 2019-08-27 DIAGNOSIS — R59.0 LOCALIZED ENLARGED LYMPH NODES: ICD-10-CM

## 2019-08-27 PROCEDURE — 88173 CYTOPATH EVAL FNA REPORT: CPT | Mod: 26

## 2019-08-27 PROCEDURE — 20206 BIOPSY MUSCLE PERQ NEEDLE: CPT

## 2019-08-27 PROCEDURE — 88305 TISSUE EXAM BY PATHOLOGIST: CPT

## 2019-08-27 PROCEDURE — 88173 CYTOPATH EVAL FNA REPORT: CPT

## 2019-08-27 PROCEDURE — 88305 TISSUE EXAM BY PATHOLOGIST: CPT | Mod: 26

## 2019-08-27 PROCEDURE — 76942 ECHO GUIDE FOR BIOPSY: CPT

## 2019-08-27 PROCEDURE — 88172 CYTP DX EVAL FNA 1ST EA SITE: CPT

## 2019-08-27 PROCEDURE — 76942 ECHO GUIDE FOR BIOPSY: CPT | Mod: 26

## 2019-08-29 LAB — NON-GYNECOLOGICAL CYTOLOGY STUDY: SIGNIFICANT CHANGE UP

## 2019-09-01 ENCOUNTER — OTHER (OUTPATIENT)
Age: 79
End: 2019-09-01

## 2019-09-10 ENCOUNTER — APPOINTMENT (OUTPATIENT)
Dept: SURGERY | Facility: CLINIC | Age: 79
End: 2019-09-10
Payer: MEDICARE

## 2019-09-10 DIAGNOSIS — R59.0 LOCALIZED ENLARGED LYMPH NODES: ICD-10-CM

## 2019-09-10 DIAGNOSIS — C13.9 MALIGNANT NEOPLASM OF HYPOPHARYNX, UNSPECIFIED: ICD-10-CM

## 2019-09-10 PROCEDURE — 99214 OFFICE O/P EST MOD 30 MIN: CPT | Mod: 25

## 2019-09-10 PROCEDURE — 31575 DIAGNOSTIC LARYNGOSCOPY: CPT

## 2019-09-10 RX ORDER — AMOXICILLIN AND CLAVULANATE POTASSIUM 875; 125 MG/1; MG/1
875-125 TABLET, COATED ORAL
Qty: 14 | Refills: 0 | Status: ACTIVE | COMMUNITY
Start: 2019-04-30

## 2019-09-10 RX ORDER — METOPROLOL SUCCINATE 25 MG/1
25 TABLET, EXTENDED RELEASE ORAL
Qty: 30 | Refills: 0 | Status: ACTIVE | COMMUNITY
Start: 2019-08-20

## 2019-09-10 RX ORDER — MINOCYCLINE HYDROCHLORIDE 100 MG/1
100 CAPSULE ORAL
Qty: 30 | Refills: 0 | Status: ACTIVE | COMMUNITY
Start: 2019-03-10

## 2019-09-10 NOTE — HISTORY OF PRESENT ILLNESS
[de-identified] : 5  months  s/p chemo/RT treatment of base of tongue ca with metastatic adenopathy.    denies pain, bleeding, dysphagia, hoarseness or new lesions. .  noted to have right upper jugular node  which was biopsied  with atypical findings  and has  refilled.

## 2019-09-10 NOTE — ASSESSMENT
[FreeTextEntry1] : in view of persistent node, discussed should have neck dissection, with potential for no residual viable tumor in specimen. risks, benefits and alternatives discussed at length. to be scheduled at [facility]. will perform DL and biopsy at same time

## 2019-09-10 NOTE — PROCEDURE
[None] : none [Flexible Endoscope] : examined with the flexible endoscope [Lesion(s)] : no lesions [de-identified] : lesion as above [Normal] : the epiglottis was regular without inflammation, lesions or masses, with regular aryepiglottic folds, and a smooth petiolus

## 2019-09-10 NOTE — PHYSICAL EXAM
[de-identified] : no palpable thyroid nodules [Nasal Endoscopy Performed] : nasal endoscopy was performed, see procedure section for findings [Laryngoscopy Performed] : laryngoscopy was performed, see procedure section for findings [de-identified] : 4 mm right paramedian base of tongue exophytic lesion [Midline] : located in midline position [Normal] : orientation to person, place, and time: normal [FreeTextEntry2] : right cheek scar overlying parotid.  multiple other facial scars [de-identified] : 1.5 cm right upper jugular node

## 2019-09-13 ENCOUNTER — OUTPATIENT (OUTPATIENT)
Dept: OUTPATIENT SERVICES | Facility: HOSPITAL | Age: 79
LOS: 1 days | End: 2019-09-13

## 2019-09-13 VITALS
RESPIRATION RATE: 14 BRPM | TEMPERATURE: 97 F | OXYGEN SATURATION: 100 % | HEIGHT: 73 IN | SYSTOLIC BLOOD PRESSURE: 108 MMHG | HEART RATE: 54 BPM | DIASTOLIC BLOOD PRESSURE: 60 MMHG | WEIGHT: 175.93 LBS

## 2019-09-13 DIAGNOSIS — E11.9 TYPE 2 DIABETES MELLITUS WITHOUT COMPLICATIONS: ICD-10-CM

## 2019-09-13 DIAGNOSIS — C02.9 MALIGNANT NEOPLASM OF TONGUE, UNSPECIFIED: ICD-10-CM

## 2019-09-13 DIAGNOSIS — Z95.2 PRESENCE OF PROSTHETIC HEART VALVE: Chronic | ICD-10-CM

## 2019-09-13 DIAGNOSIS — C77.0 SECONDARY AND UNSPECIFIED MALIGNANT NEOPLASM OF LYMPH NODES OF HEAD, FACE AND NECK: ICD-10-CM

## 2019-09-13 DIAGNOSIS — Z98.890 OTHER SPECIFIED POSTPROCEDURAL STATES: Chronic | ICD-10-CM

## 2019-09-13 DIAGNOSIS — I48.91 UNSPECIFIED ATRIAL FIBRILLATION: ICD-10-CM

## 2019-09-13 LAB
ANION GAP SERPL CALC-SCNC: 14 MMO/L — SIGNIFICANT CHANGE UP (ref 7–14)
BASOPHILS # BLD AUTO: 0.03 K/UL — SIGNIFICANT CHANGE UP (ref 0–0.2)
BASOPHILS NFR BLD AUTO: 0.5 % — SIGNIFICANT CHANGE UP (ref 0–2)
BLD GP AB SCN SERPL QL: NEGATIVE — SIGNIFICANT CHANGE UP
BUN SERPL-MCNC: 22 MG/DL — SIGNIFICANT CHANGE UP (ref 7–23)
CALCIUM SERPL-MCNC: 10.3 MG/DL — SIGNIFICANT CHANGE UP (ref 8.4–10.5)
CHLORIDE SERPL-SCNC: 100 MMOL/L — SIGNIFICANT CHANGE UP (ref 98–107)
CO2 SERPL-SCNC: 26 MMOL/L — SIGNIFICANT CHANGE UP (ref 22–31)
CREAT SERPL-MCNC: 1.41 MG/DL — HIGH (ref 0.5–1.3)
EOSINOPHIL # BLD AUTO: 0.06 K/UL — SIGNIFICANT CHANGE UP (ref 0–0.5)
EOSINOPHIL NFR BLD AUTO: 1.1 % — SIGNIFICANT CHANGE UP (ref 0–6)
GLUCOSE SERPL-MCNC: 182 MG/DL — HIGH (ref 70–99)
HBA1C BLD-MCNC: 7 % — HIGH (ref 4–5.6)
HCT VFR BLD CALC: 37.8 % — LOW (ref 39–50)
HGB BLD-MCNC: 12.3 G/DL — LOW (ref 13–17)
IMM GRANULOCYTES NFR BLD AUTO: 0.2 % — SIGNIFICANT CHANGE UP (ref 0–1.5)
LYMPHOCYTES # BLD AUTO: 0.75 K/UL — LOW (ref 1–3.3)
LYMPHOCYTES # BLD AUTO: 13.5 % — SIGNIFICANT CHANGE UP (ref 13–44)
MANUAL SMEAR VERIFICATION: SIGNIFICANT CHANGE UP
MCHC RBC-ENTMCNC: 29.5 PG — SIGNIFICANT CHANGE UP (ref 27–34)
MCHC RBC-ENTMCNC: 32.5 % — SIGNIFICANT CHANGE UP (ref 32–36)
MCV RBC AUTO: 90.6 FL — SIGNIFICANT CHANGE UP (ref 80–100)
MONOCYTES # BLD AUTO: 0.61 K/UL — SIGNIFICANT CHANGE UP (ref 0–0.9)
MONOCYTES NFR BLD AUTO: 11 % — SIGNIFICANT CHANGE UP (ref 2–14)
NEUTROPHILS # BLD AUTO: 4.1 K/UL — SIGNIFICANT CHANGE UP (ref 1.8–7.4)
NEUTROPHILS NFR BLD AUTO: 73.7 % — SIGNIFICANT CHANGE UP (ref 43–77)
NRBC # FLD: 0 K/UL — SIGNIFICANT CHANGE UP (ref 0–0)
OVALOCYTES BLD QL SMEAR: SLIGHT — SIGNIFICANT CHANGE UP
PLATELET # BLD AUTO: 92 K/UL — LOW (ref 150–400)
PLATELET COUNT - ESTIMATE: SIGNIFICANT CHANGE UP
PMV BLD: 10.7 FL — SIGNIFICANT CHANGE UP (ref 7–13)
POIKILOCYTOSIS BLD QL AUTO: SLIGHT — SIGNIFICANT CHANGE UP
POTASSIUM SERPL-MCNC: 4.7 MMOL/L — SIGNIFICANT CHANGE UP (ref 3.5–5.3)
POTASSIUM SERPL-SCNC: 4.7 MMOL/L — SIGNIFICANT CHANGE UP (ref 3.5–5.3)
RBC # BLD: 4.17 M/UL — LOW (ref 4.2–5.8)
RBC # FLD: 13.9 % — SIGNIFICANT CHANGE UP (ref 10.3–14.5)
RH IG SCN BLD-IMP: POSITIVE — SIGNIFICANT CHANGE UP
SODIUM SERPL-SCNC: 140 MMOL/L — SIGNIFICANT CHANGE UP (ref 135–145)
WBC # BLD: 5.56 K/UL — SIGNIFICANT CHANGE UP (ref 3.8–10.5)
WBC # FLD AUTO: 5.56 K/UL — SIGNIFICANT CHANGE UP (ref 3.8–10.5)

## 2019-09-13 NOTE — H&P PST ADULT - NS MD HP INPLANTS MED DEV
bovine tissue valve  model # [id number], serial # [id number]/Lens implant Lens implant/AVR - bovine tissue valve  model # [id number], serial # [id number]

## 2019-09-13 NOTE — H&P PST ADULT - NSICDXPASTMEDICALHX_GEN_ALL_CORE_FT
PAST MEDICAL HISTORY:  Diabetes Mellitus     Gallstones     Pleural Effusion     Viral Meningitis PAST MEDICAL HISTORY:  Afib     Diabetes Mellitus     Gallstones     Pleural Effusion     Viral Meningitis PAST MEDICAL HISTORY:  Afib     Diabetes Mellitus     Gallstones     Malignant neoplasm of tongue     Pleural Effusion     Viral Meningitis

## 2019-09-13 NOTE — H&P PST ADULT - NSANTHOSAYNRD_GEN_A_CORE
No. SHA screening performed.  STOP BANG Legend: 0-2 = LOW Risk; 3-4 = INTERMEDIATE Risk; 5-8 = HIGH Risk

## 2019-09-13 NOTE — H&P PST ADULT - NEGATIVE ENMT SYMPTOMS
no tinnitus/no nasal congestion/no vertigo/no sinus symptoms/no nasal discharge/no post-nasal discharge/no nasal obstruction/no hearing difficulty/no ear pain

## 2019-09-13 NOTE — H&P PST ADULT - NEGATIVE NEUROLOGICAL SYMPTOMS
no transient paralysis/no tremors/no vertigo/no syncope/no paresthesias/no weakness/no generalized seizures/no focal seizures

## 2019-09-13 NOTE — H&P PST ADULT - NEGATIVE MUSCULOSKELETAL SYMPTOMS
no muscle cramps/no neck pain/no arthralgia/no arthritis/no joint swelling/no muscle weakness/no myalgia/no stiffness/no arm pain L

## 2019-09-13 NOTE — H&P PST ADULT - RS GEN PE MLT RESP DETAILS PC
no wheezes/breath sounds equal/no rales/no subcutaneous emphysema/good air movement/respirations non-labored/no chest wall tenderness/no rhonchi/airway patent/clear to auscultation bilaterally/no intercostal retractions

## 2019-09-13 NOTE — H&P PST ADULT - HISTORY OF PRESENT ILLNESS
79 year old  male with PMH: DM type 1, AFIB.,  felt a lump on right side of nec, pt was evaluated by PCP who recommended biopsy then referred to surgeon, S/P Pet scan - Had radiation and chemo 2018. Pt now scheduled for Direct laryngoscopy, 79 year old  male with PMH: DM type 2, AFIB., presents to PST for pre op evaluation stated that he felt a lump on right side of his neck 1 year ago, pt was evaluated by PCP who recommended biopsy then referred to surgeon, S/P Pet scan - Had radiation and chemo 2018. Pt now scheduled for Direct laryngoscopy with biopsy, right neck dissection on 09/19/19

## 2019-09-13 NOTE — H&P PST ADULT - NEGATIVE OPHTHALMOLOGIC SYMPTOMS
no lacrimation L/no lacrimation R/no discharge L/no pain R/no irritation L/no blurred vision L/no loss of vision L/no irritation R/no loss of vision R/no blurred vision R/no discharge R/no pain L/no diplopia

## 2019-09-13 NOTE — H&P PST ADULT - NSICDXPASTSURGICALHX_GEN_ALL_CORE_FT
PAST SURGICAL HISTORY:  Aortocoronary Artery Bypass Graft (ICD9 V45.81)     Cataract bilateral IOL    H/O aortic valve replacement 2009    H/O peripheral artery bypass RLE: 2016    History of Aortic Valve Replacement (ICD9 V43.3)     History of Appendectomy     History of toe surgery right 4th toe; 2016    S/P Laparoscopic Cholecystectomy 10/2009    S/P Tonsillectomy PAST SURGICAL HISTORY:  Aortocoronary Artery Bypass Graft (ICD9 V45.81)     Cataract bilateral IOL    H/O aortic valve replacement 2009    H/O peripheral artery bypass RLE; 2016    History of Aortic Valve Replacement (ICD9 V43.3)     History of Appendectomy     History of toe surgery right 4th toe; 2016    S/P Laparoscopic Cholecystectomy 10/2009    S/P Tonsillectomy PAST SURGICAL HISTORY:  Aortocoronary Artery Bypass Graft (ICD9 V45.81)     Cataract bilateral IOL    H/O aortic valve replacement 2009    H/O peripheral artery bypass RLE; 2016    History of Aortic Valve Replacement (ICD9 V43.3)     History of Appendectomy     History of back surgery 2016    History of bronchoscopy right VAT; 2010    History of toe surgery right 4th toe; 2016    S/P Laparoscopic Cholecystectomy 10/2009    S/P Tonsillectomy

## 2019-09-13 NOTE — H&P PST ADULT - NEGATIVE GENERAL SYMPTOMS
no fever/no weight gain/no polyphagia/no polyuria/no weight loss/no malaise/no chills/no sweating/no anorexia

## 2019-09-13 NOTE — H&P PST ADULT - NSICDXPROBLEM_GEN_ALL_CORE_FT
PROBLEM DIAGNOSES  Problem: Malignant neoplasm of tongue  Assessment and Plan: Scheduled for Direct Laryngoscopy with biopsy, right neck dissection on 09/19/19. Pre op instructions, famotidine, chlorhexidine gluconate soap given and explained. Pt verbalized understanding.    Problem: Afib  Assessment and Plan: Pt stated that he was seen by Dr. Pena on 09/12 for pre op MD jeffy stated that he'll communicate to surgeon regarding remaining on Aspirin 325 mg or decrease ASA to 81 mg pre op  NP to follow   Pending cardiology consultation    Problem: Diabetes mellitus  Assessment and Plan: Monitor BS on day of surgery.  Pt instructed to decrease dos eof Levemir by 20% the night and the morning before surgery. Pt verbalized understanding.

## 2019-09-15 ENCOUNTER — INPATIENT (INPATIENT)
Facility: HOSPITAL | Age: 79
LOS: 8 days | End: 2019-09-24
Attending: HOSPITALIST | Admitting: HOSPITALIST
Payer: MEDICARE

## 2019-09-15 VITALS
HEART RATE: 78 BPM | RESPIRATION RATE: 16 BRPM | SYSTOLIC BLOOD PRESSURE: 138 MMHG | TEMPERATURE: 98 F | OXYGEN SATURATION: 97 % | DIASTOLIC BLOOD PRESSURE: 68 MMHG

## 2019-09-15 DIAGNOSIS — Z98.890 OTHER SPECIFIED POSTPROCEDURAL STATES: Chronic | ICD-10-CM

## 2019-09-15 DIAGNOSIS — Z95.2 PRESENCE OF PROSTHETIC HEART VALVE: Chronic | ICD-10-CM

## 2019-09-15 NOTE — ED ADULT TRIAGE NOTE - CHIEF COMPLAINT QUOTE
Pt presents with c/o right shoulder pain s/p mechanical fall. Pt denies LOC, head/neck/back pain. Pt on 81mg of ASA daily, no other anticoagulants.

## 2019-09-16 ENCOUNTER — OTHER (OUTPATIENT)
Age: 79
End: 2019-09-16

## 2019-09-16 DIAGNOSIS — C02.9 MALIGNANT NEOPLASM OF TONGUE, UNSPECIFIED: ICD-10-CM

## 2019-09-16 DIAGNOSIS — S42.201A UNSPECIFIED FRACTURE OF UPPER END OF RIGHT HUMERUS, INITIAL ENCOUNTER FOR CLOSED FRACTURE: ICD-10-CM

## 2019-09-16 DIAGNOSIS — R42 DIZZINESS AND GIDDINESS: ICD-10-CM

## 2019-09-16 DIAGNOSIS — Z29.9 ENCOUNTER FOR PROPHYLACTIC MEASURES, UNSPECIFIED: ICD-10-CM

## 2019-09-16 DIAGNOSIS — E11.9 TYPE 2 DIABETES MELLITUS WITHOUT COMPLICATIONS: ICD-10-CM

## 2019-09-16 DIAGNOSIS — I48.2 CHRONIC ATRIAL FIBRILLATION: ICD-10-CM

## 2019-09-16 PROBLEM — I48.91 UNSPECIFIED ATRIAL FIBRILLATION: Chronic | Status: ACTIVE | Noted: 2019-09-13

## 2019-09-16 LAB
ALBUMIN SERPL ELPH-MCNC: 4.2 G/DL — SIGNIFICANT CHANGE UP (ref 3.3–5)
ALP SERPL-CCNC: 70 U/L — SIGNIFICANT CHANGE UP (ref 40–120)
ALT FLD-CCNC: 10 U/L — SIGNIFICANT CHANGE UP (ref 4–41)
ANION GAP SERPL CALC-SCNC: 11 MMO/L — SIGNIFICANT CHANGE UP (ref 7–14)
APTT BLD: 27.2 SEC — LOW (ref 27.5–36.3)
AST SERPL-CCNC: 16 U/L — SIGNIFICANT CHANGE UP (ref 4–40)
BASE EXCESS BLDV CALC-SCNC: 4.8 MMOL/L — SIGNIFICANT CHANGE UP
BILIRUB SERPL-MCNC: 0.7 MG/DL — SIGNIFICANT CHANGE UP (ref 0.2–1.2)
BUN SERPL-MCNC: 22 MG/DL — SIGNIFICANT CHANGE UP (ref 7–23)
CALCIUM SERPL-MCNC: 10 MG/DL — SIGNIFICANT CHANGE UP (ref 8.4–10.5)
CHLORIDE SERPL-SCNC: 105 MMOL/L — SIGNIFICANT CHANGE UP (ref 98–107)
CO2 SERPL-SCNC: 28 MMOL/L — SIGNIFICANT CHANGE UP (ref 22–31)
CREAT SERPL-MCNC: 1.42 MG/DL — HIGH (ref 0.5–1.3)
GAS PNL BLDV: 141 MMOL/L — SIGNIFICANT CHANGE UP (ref 136–146)
GLUCOSE BLDV-MCNC: 160 MG/DL — HIGH (ref 70–99)
GLUCOSE SERPL-MCNC: 170 MG/DL — HIGH (ref 70–99)
HCO3 BLDV-SCNC: 27 MMOL/L — SIGNIFICANT CHANGE UP (ref 20–27)
HCT VFR BLD CALC: 33.9 % — LOW (ref 39–50)
HCT VFR BLDV CALC: 35 % — LOW (ref 39–51)
HGB BLD-MCNC: 10.8 G/DL — LOW (ref 13–17)
HGB BLDV-MCNC: 11.3 G/DL — LOW (ref 13–17)
INR BLD: 1.35 — HIGH (ref 0.88–1.17)
MCHC RBC-ENTMCNC: 29.2 PG — SIGNIFICANT CHANGE UP (ref 27–34)
MCHC RBC-ENTMCNC: 31.9 % — LOW (ref 32–36)
MCV RBC AUTO: 91.6 FL — SIGNIFICANT CHANGE UP (ref 80–100)
NRBC # FLD: 0 K/UL — SIGNIFICANT CHANGE UP (ref 0–0)
PCO2 BLDV: 52 MMHG — HIGH (ref 41–51)
PH BLDV: 7.37 PH — SIGNIFICANT CHANGE UP (ref 7.32–7.43)
PLATELET # BLD AUTO: 93 K/UL — LOW (ref 150–400)
PMV BLD: 10.4 FL — SIGNIFICANT CHANGE UP (ref 7–13)
PO2 BLDV: < 24 MMHG — LOW (ref 35–40)
POTASSIUM BLDV-SCNC: 4.5 MMOL/L — SIGNIFICANT CHANGE UP (ref 3.4–4.5)
POTASSIUM SERPL-MCNC: 4.7 MMOL/L — SIGNIFICANT CHANGE UP (ref 3.5–5.3)
POTASSIUM SERPL-SCNC: 4.7 MMOL/L — SIGNIFICANT CHANGE UP (ref 3.5–5.3)
PROT SERPL-MCNC: 6.9 G/DL — SIGNIFICANT CHANGE UP (ref 6–8.3)
PROTHROM AB SERPL-ACNC: 15.1 SEC — HIGH (ref 9.8–13.1)
RBC # BLD: 3.7 M/UL — LOW (ref 4.2–5.8)
RBC # FLD: 13.7 % — SIGNIFICANT CHANGE UP (ref 10.3–14.5)
SAO2 % BLDV: 21.9 % — LOW (ref 60–85)
SODIUM SERPL-SCNC: 144 MMOL/L — SIGNIFICANT CHANGE UP (ref 135–145)
TROPONIN T, HIGH SENSITIVITY: 47 NG/L — SIGNIFICANT CHANGE UP (ref ?–14)
TROPONIN T, HIGH SENSITIVITY: 51 NG/L — SIGNIFICANT CHANGE UP (ref ?–14)
WBC # BLD: 6.67 K/UL — SIGNIFICANT CHANGE UP (ref 3.8–10.5)
WBC # FLD AUTO: 6.67 K/UL — SIGNIFICANT CHANGE UP (ref 3.8–10.5)

## 2019-09-16 PROCEDURE — 73200 CT UPPER EXTREMITY W/O DYE: CPT | Mod: 26,RT

## 2019-09-16 PROCEDURE — 71045 X-RAY EXAM CHEST 1 VIEW: CPT | Mod: 26

## 2019-09-16 PROCEDURE — 73030 X-RAY EXAM OF SHOULDER: CPT | Mod: 26,RT

## 2019-09-16 PROCEDURE — 99223 1ST HOSP IP/OBS HIGH 75: CPT | Mod: AI,GC

## 2019-09-16 PROCEDURE — 70496 CT ANGIOGRAPHY HEAD: CPT | Mod: 26

## 2019-09-16 PROCEDURE — 73060 X-RAY EXAM OF HUMERUS: CPT | Mod: 26,RT

## 2019-09-16 PROCEDURE — 70498 CT ANGIOGRAPHY NECK: CPT | Mod: 26

## 2019-09-16 PROCEDURE — 76377 3D RENDER W/INTRP POSTPROCES: CPT | Mod: 26,59

## 2019-09-16 PROCEDURE — 73020 X-RAY EXAM OF SHOULDER: CPT | Mod: 26,59,RT

## 2019-09-16 RX ORDER — METOPROLOL TARTRATE 50 MG
1 TABLET ORAL
Qty: 0 | Refills: 0 | DISCHARGE

## 2019-09-16 RX ORDER — CLOPIDOGREL BISULFATE 75 MG/1
75 TABLET, FILM COATED ORAL DAILY
Refills: 0 | Status: DISCONTINUED | OUTPATIENT
Start: 2019-09-16 | End: 2019-09-24

## 2019-09-16 RX ORDER — INSULIN DETEMIR 100/ML (3)
26 INSULIN PEN (ML) SUBCUTANEOUS
Qty: 0 | Refills: 0 | DISCHARGE

## 2019-09-16 RX ORDER — ACETAMINOPHEN 500 MG
975 TABLET ORAL EVERY 6 HOURS
Refills: 0 | Status: DISCONTINUED | OUTPATIENT
Start: 2019-09-16 | End: 2019-09-24

## 2019-09-16 RX ORDER — OXYCODONE HYDROCHLORIDE 5 MG/1
5 TABLET ORAL ONCE
Refills: 0 | Status: DISCONTINUED | OUTPATIENT
Start: 2019-09-16 | End: 2019-09-16

## 2019-09-16 RX ORDER — INSULIN ASPART 100 [IU]/ML
10 INJECTION, SOLUTION SUBCUTANEOUS
Qty: 0 | Refills: 0 | DISCHARGE

## 2019-09-16 RX ORDER — HYDROMORPHONE HYDROCHLORIDE 2 MG/ML
0.25 INJECTION INTRAMUSCULAR; INTRAVENOUS; SUBCUTANEOUS ONCE
Refills: 0 | Status: DISCONTINUED | OUTPATIENT
Start: 2019-09-16 | End: 2019-09-16

## 2019-09-16 RX ORDER — DEXTROSE 50 % IN WATER 50 %
15 SYRINGE (ML) INTRAVENOUS ONCE
Refills: 0 | Status: COMPLETED | OUTPATIENT
Start: 2019-09-16 | End: 2019-09-16

## 2019-09-16 RX ORDER — GLUCAGON INJECTION, SOLUTION 0.5 MG/.1ML
1 INJECTION, SOLUTION SUBCUTANEOUS ONCE
Refills: 0 | Status: DISCONTINUED | OUTPATIENT
Start: 2019-09-16 | End: 2019-09-24

## 2019-09-16 RX ORDER — DEXTROSE 50 % IN WATER 50 %
12.5 SYRINGE (ML) INTRAVENOUS ONCE
Refills: 0 | Status: DISCONTINUED | OUTPATIENT
Start: 2019-09-16 | End: 2019-09-24

## 2019-09-16 RX ORDER — DEXTROSE 50 % IN WATER 50 %
15 SYRINGE (ML) INTRAVENOUS ONCE
Refills: 0 | Status: DISCONTINUED | OUTPATIENT
Start: 2019-09-16 | End: 2019-09-24

## 2019-09-16 RX ORDER — INSULIN GLARGINE 100 [IU]/ML
15 INJECTION, SOLUTION SUBCUTANEOUS AT BEDTIME
Refills: 0 | Status: DISCONTINUED | OUTPATIENT
Start: 2019-09-16 | End: 2019-09-16

## 2019-09-16 RX ORDER — TETANUS TOXOID, REDUCED DIPHTHERIA TOXOID AND ACELLULAR PERTUSSIS VACCINE, ADSORBED 5; 2.5; 8; 8; 2.5 [IU]/.5ML; [IU]/.5ML; UG/.5ML; UG/.5ML; UG/.5ML
0.5 SUSPENSION INTRAMUSCULAR ONCE
Refills: 0 | Status: COMPLETED | OUTPATIENT
Start: 2019-09-16 | End: 2019-09-16

## 2019-09-16 RX ORDER — ASPIRIN/CALCIUM CARB/MAGNESIUM 324 MG
1 TABLET ORAL
Qty: 0 | Refills: 0 | DISCHARGE

## 2019-09-16 RX ORDER — ASPIRIN/CALCIUM CARB/MAGNESIUM 324 MG
325 TABLET ORAL DAILY
Refills: 0 | Status: DISCONTINUED | OUTPATIENT
Start: 2019-09-16 | End: 2019-09-24

## 2019-09-16 RX ORDER — INSULIN DETEMIR 100/ML (3)
26 INSULIN PEN (ML) SUBCUTANEOUS AT BEDTIME
Refills: 0 | Status: DISCONTINUED | OUTPATIENT
Start: 2019-09-16 | End: 2019-09-16

## 2019-09-16 RX ORDER — OXYCODONE HYDROCHLORIDE 5 MG/1
5 TABLET ORAL EVERY 4 HOURS
Refills: 0 | Status: DISCONTINUED | OUTPATIENT
Start: 2019-09-16 | End: 2019-09-17

## 2019-09-16 RX ORDER — ACETAMINOPHEN 500 MG
975 TABLET ORAL ONCE
Refills: 0 | Status: COMPLETED | OUTPATIENT
Start: 2019-09-16 | End: 2019-09-16

## 2019-09-16 RX ORDER — SODIUM CHLORIDE 9 MG/ML
1000 INJECTION, SOLUTION INTRAVENOUS
Refills: 0 | Status: DISCONTINUED | OUTPATIENT
Start: 2019-09-16 | End: 2019-09-24

## 2019-09-16 RX ORDER — METOPROLOL TARTRATE 50 MG
25 TABLET ORAL DAILY
Refills: 0 | Status: DISCONTINUED | OUTPATIENT
Start: 2019-09-16 | End: 2019-09-24

## 2019-09-16 RX ORDER — DEXTROSE 50 % IN WATER 50 %
25 SYRINGE (ML) INTRAVENOUS ONCE
Refills: 0 | Status: DISCONTINUED | OUTPATIENT
Start: 2019-09-16 | End: 2019-09-24

## 2019-09-16 RX ORDER — INSULIN LISPRO 100/ML
VIAL (ML) SUBCUTANEOUS
Refills: 0 | Status: DISCONTINUED | OUTPATIENT
Start: 2019-09-16 | End: 2019-09-24

## 2019-09-16 RX ORDER — INSULIN GLARGINE 100 [IU]/ML
20 INJECTION, SOLUTION SUBCUTANEOUS AT BEDTIME
Refills: 0 | Status: DISCONTINUED | OUTPATIENT
Start: 2019-09-16 | End: 2019-09-17

## 2019-09-16 RX ADMIN — Medication 975 MILLIGRAM(S): at 07:40

## 2019-09-16 RX ADMIN — INSULIN GLARGINE 20 UNIT(S): 100 INJECTION, SOLUTION SUBCUTANEOUS at 23:15

## 2019-09-16 RX ADMIN — TETANUS TOXOID, REDUCED DIPHTHERIA TOXOID AND ACELLULAR PERTUSSIS VACCINE, ADSORBED 0.5 MILLILITER(S): 5; 2.5; 8; 8; 2.5 SUSPENSION INTRAMUSCULAR at 04:15

## 2019-09-16 RX ADMIN — OXYCODONE HYDROCHLORIDE 5 MILLIGRAM(S): 5 TABLET ORAL at 07:40

## 2019-09-16 RX ADMIN — HYDROMORPHONE HYDROCHLORIDE 0.25 MILLIGRAM(S): 2 INJECTION INTRAMUSCULAR; INTRAVENOUS; SUBCUTANEOUS at 14:28

## 2019-09-16 RX ADMIN — HYDROMORPHONE HYDROCHLORIDE 0.25 MILLIGRAM(S): 2 INJECTION INTRAMUSCULAR; INTRAVENOUS; SUBCUTANEOUS at 14:42

## 2019-09-16 RX ADMIN — Medication 975 MILLIGRAM(S): at 08:40

## 2019-09-16 RX ADMIN — OXYCODONE HYDROCHLORIDE 5 MILLIGRAM(S): 5 TABLET ORAL at 08:45

## 2019-09-16 RX ADMIN — Medication 15 GRAM(S): at 22:15

## 2019-09-16 NOTE — H&P ADULT - PROBLEM SELECTOR PLAN 1
ortho recs appreciated : right proximal humerus fracture  Pain control  NWB R UE in sling  encourage active finger motion  no acute orthopedic intervention  Active movement of fingers/wrist/elbow encouraged  Follow up with Dr. Person within 2 weeks, call office for appointment [phone]  no contraindication to ENT surgery if patient is to be in standard supine position, do not manipulate right arm

## 2019-09-16 NOTE — ED ADULT NURSE NOTE - INTERVENTIONS DEFINITIONS
Reinforce activity limits and safety measures with patient and family/Stretcher in lowest position, wheels locked, appropriate side rails in place/Monitor gait and stability/Review medications for side effects contributing to fall risk/Call bell, personal items and telephone within reach/[place] to call system/Physically safe environment: no spills, clutter or unnecessary equipment

## 2019-09-16 NOTE — H&P ADULT - HISTORY OF PRESENT ILLNESS
79 y M with PMH CAD s/p CABG and AVR, Afib not on AC, IDDM2, SCC dxed in 12/2018 s/p 35 rounds of RT and 7 rounds of chemo presents to the ED on night of 9/15 after sustaining a fall secondary to dizziness. t 79 y M with PMH CAD s/p CABG and AVR, Afib not on AC, IDDM2, SCC dxed in 12/2018 s/p 35 rounds of RT and 7 rounds of chemo presents to the ED on night of 9/15 with right shoulder pain after sustaining a fall secondary to dizziness. Patient denies any LOC or head trauma during this fall. Patient has been feeling dizzy and sustaining mild falls or loss of balance for 3-4 months but has not yet been 79 y M with PMH CAD s/p CABG and AVR, Afib not on AC, IDDM2, SCC dxed in 12/2018 s/p 35 rounds of RT and 7 rounds of chemo presents to the ED on night of 9/15 with right shoulder pain after sustaining a fall secondary to dizziness. Patient denies any LOC or head trauma during this fall. Patient has been feeling dizzy and sustaining mild falls or loss of balance for 3-4 months but has not yet been evaluated for it. Patient denies specific triggers although notices that last night the dizziness occurred when he turned his head quickly. The dizziness he experiences is a sense of the room spinning although he does have moments of light headedness when he gets up quickly from a chair. Patient is scheduled for an bx and resection for SCC of the neck with Dr. Urbina for thursday and has been 79 y M with PMH CAD s/p CABG and AVR, Afib not on AC, IDDM2, SCC dxed in 12/2018 s/p 35 rounds of RT and 7 rounds of chemo presents to the ED on night of 9/15 with right shoulder pain after sustaining a fall secondary to dizziness. Patient denies any LOC or head trauma during this fall. Patient has been feeling dizzy and sustaining mild falls or loss of balance for 3-4 months but has not yet been evaluated for it. Patient denies specific triggers although notices that last night the dizziness occurred when he turned his head quickly. The dizziness he experiences is a sense of the room spinning although he does have moments of light headedness when he gets up quickly from a chair. Patient does experience occasional episode of hypoglycemia where his blood  with his blood sugars usually ranging from 90-150s. Patient is scheduled for an bx and resection for SCC of the neck with Dr. Urbina for thursday and has been completing multiple preop evaluations. 79 y M with PMH CAD s/p CABG and AVR, Afib not on AC, IDDM2, SCC of the right neck dxed in 12/2018 s/p 35 rounds of RT and 7 rounds of chemo presents to the ED on night of 9/15 with right shoulder pain after sustaining a fall secondary to dizziness. Patient denies any LOC or head trauma during this fall. Patient has been feeling dizzy and sustaining mild falls or loss of balance for 3-4 months (8-9 falls) but has not yet been evaluated for it. Patient denies specific triggers although notices that last night the dizziness occurred when he turned his head quickly. The dizziness he experiences is a sense of the room spinning although he does have moments of light headedness when he gets up quickly from a chair and endorses some visual floaters. Patient also feels a loss of balance when he is shaving. Patient does experience occasional episodes of hypoglycemia (2 in the last month) where his blood sugars can go to 50s and will experience lethargy and tremors however usually his blood sugars run in the 90-150s. Patient does not regularly check his blood pressures but have noticed that they have been <120/80 during his recent visits. Patient denies CP, SOB, or palpitations. Patient does endorse poor po intake of water, and loss of appetite due to loss of taste after radiation, however weight has stabilized after initial drop of 40 lbs and has some constipation at baseline.  Patient is scheduled for an bx and resection for SCC of the neck with Dr. Urbina for Thursday and has been completing multiple preop evaluations. Patient denies any fevers, chills, abdominal pain, diarrhea, urinary retention, dysuria, cough, headache.    ED course: found to have sustained a Acute comminuted impacted fracture at the surgical neck of the right   humerus. No glenohumeral dislocation 79 y M with PMH CAD s/p CABG and AVR, Afib not on AC, IDDM2, T1N1M0 Stage 1 SCC right base of tongue with ipsilateral adenopathy dxed in 12/2018 s/p 35 rounds of RT and 7 rounds of chemo presents to the ED on night of 9/15 with right shoulder pain after sustaining a fall secondary to dizziness. Patient denies any LOC or head trauma during this fall. Patient has been feeling dizzy and sustaining mild falls or loss of balance for 3-4 months (8-9 falls) but has not yet been evaluated for it. Patient denies specific triggers although notices that last night the dizziness occurred when he turned his head quickly. The dizziness he experiences is a sense of the room spinning although he does have moments of light headedness when he gets up quickly from a chair and endorses some visual floaters. Patient also feels a loss of balance when he is shaving. Patient does experience occasional episodes of hypoglycemia (2 in the last month) where his blood sugars can go to 50s and will experience lethargy and tremors however usually his blood sugars run in the 90-150s. Patient does not regularly check his blood pressures but have noticed that they have been <120/80 during his recent visits. Patient denies CP, SOB, or palpitations. Patient does endorse poor po intake of water, and loss of appetite due to loss of taste after radiation, however weight has stabilized after initial drop of 40 lbs and has some constipation at baseline.  Patient is scheduled for an bx and resection for SCC of the neck with Dr. Urbina for Thursday and has been completing multiple preop evaluations. Patient denies any fevers, chills, abdominal pain, diarrhea, urinary retention, dysuria, cough, headache.    ED course: found to have sustained a Acute comminuted impacted fracture at the surgical neck of the right   humerus. No glenohumeral dislocation

## 2019-09-16 NOTE — ED PROVIDER NOTE - ATTENDING CONTRIBUTION TO CARE
MD Hancock:  I performed a face to face bedside interview with patient regarding history of present illness, review of symptoms and past medical history. I completed an independent physical exam(documented below).  I have discussed patient's plan of care with resident.   I agree with note as stated above, having amended the EMR as needed to reflect my findings. I have discussed the assessment and plan of care.  This includes during the time I functioned as the attending physician for this patient.  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 Also reports dizzy spells X 2 to 3 months, multiple times each day lasting seconds at a time. Labs, imaging, ecg, reassess.

## 2019-09-16 NOTE — CONSULT NOTE ADULT - ATTENDING COMMENTS
Mr. Benjamin is a right handed 79 y M who presents to the ED on the evening of 9/15/19  after sustaining a fall secondary to dizziness. Patient denies any LOC or head trauma during this fall. Patient has been feeling dizzy and sustaining mild falls or loss of balance for 6 months (8-9 falls) since the diagnosis of his small cell carcinoma. His past medical history significant for  CAD s/p CABG and AVR, Afib not on AC, IDDM2, SCC of the right neck dxed in 12/2018 s/p 35 rounds of RT and 7 rounds of chemo.  on neurological exam he is alert, awake oriented, his neurological exam is nonfocal.  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 Cardiac workup as necessary per primary medical team.  continue aspirin and Plavix Protonix aspirin 81 mg only, statins, DVT prophylaxis, PT OT and rehabilitation consultation

## 2019-09-16 NOTE — H&P ADULT - NSHPLABSRESULTS_GEN_ALL_CORE
.  LABS:                         10.8   6.67  )-----------( 93       ( 16 Sep 2019 04:10 )             33.9     09-16    144  |  105  |  22  ----------------------------<  170<H>  4.7   |  28  |  1.42<H>    Ca    10.0      16 Sep 2019 04:10    TPro  6.9  /  Alb  4.2  /  TBili  0.7  /  DBili  x   /  AST  16  /  ALT  10  /  AlkPhos  70  09-16    PT/INR - ( 16 Sep 2019 04:10 )   PT: 15.1 SEC;   INR: 1.35          PTT - ( 16 Sep 2019 04:10 )  PTT:27.2 SEC          RADIOLOGY, EKG & ADDITIONAL TESTS: Reviewed.

## 2019-09-16 NOTE — ED ADULT NURSE NOTE - OBJECTIVE STATEMENT
Pt. received in room 15, A&Ox4, ambulatory with cane at baseline. Pt. c/o right shoulder pain s/p fall. Pt. states he fell twice after getting out of bed, "turned too quickly" with cane for second fall. Arrives with laceration to left hand after "hitting it on bedside table" and diffuse bruising to bilateral lower extremities. Denies hitting head, loss of consciousness, headache, neck/back pain, numbness, tingling, chest pain, SOB, dizziness. Respirations are even & unlabored on room air. MD at bedside for evaluation. Will continue to monitor.

## 2019-09-16 NOTE — ED PROVIDER NOTE - PHYSICAL EXAMINATION
PGY2/MD Jennifer.   VITALS: reviewed  GEN: No apparent distress, A & O x 4  HEAD/EYES: NC/AT, PERRL, EOMI, anicteric sclerae, no conjunctival pallor  ENT: mucus membranes moist, oropharynx WNL, trachea midline, no JVD, neck is supple  RESP: lungs CTA with equal breath sounds bilaterally, chest wall nontender and atraumatic  CV: heart with reg rhythm S1, S2, no murmur; distal pulses intact and symmetric bilaterally  ABDOMEN: normoactive bowel sounds, soft, nondistended, nontender, no palpable masses  : no CVAT  MSK: extremities atraumatic and nontender, no edema, no asymmetry. + right shoulder tenderness, with no disposition, + off midline cervical tenderness  SKIN: warm, dry, no rash, no bruising, no cyanosis. color appropriate for ethnicity  NEURO: alert, mentating appropriately, no facial asymmetry. gross sensation, motor, coordination are intact  PSYCH: Affect appropriate

## 2019-09-16 NOTE — ED ADULT NURSE NOTE - PSH
Aortocoronary Artery Bypass Graft (ICD9 V45.81)    Cataract  bilateral IOL  H/O aortic valve replacement  2009  H/O peripheral artery bypass  RLE; 2016  History of Aortic Valve Replacement (ICD9 V43.3)    History of Appendectomy    History of back surgery  2016  History of bronchoscopy  right VAT; 2010  History of toe surgery  right 4th toe; 2016  S/P Laparoscopic Cholecystectomy  10/2009  S/P Tonsillectomy

## 2019-09-16 NOTE — H&P ADULT - NSICDXPASTMEDICALHX_GEN_ALL_CORE_FT
PAST MEDICAL HISTORY:  Afib     Diabetes Mellitus     Gallstones     Malignant neoplasm of tongue     Pleural Effusion     Viral Meningitis

## 2019-09-16 NOTE — H&P ADULT - NSHPREVIEWOFSYSTEMS_GEN_ALL_CORE
Review of Systems:  Constitutional: No fever,   Head: No headache   Eyes: No blurry vision, No diplopia  Neuro: No muscle weakness   Cardiovascular: No chest pain, No palpitations  Respiratory: No SOB, No cough  GI: No nausea, No vomiting, No diarrhea  : No dysuria, No hematuria  Skin: No rash  MSK: No joint pain   Psych: No depression

## 2019-09-16 NOTE — H&P ADULT - ASSESSMENT
79 y M with PMH CAD s/p CABG and AVR, Afib not on AC, IDDM2, SCC of the right neck dxed in 12/2018 s/p 35 rounds of RT and 7 rounds of chemo presents on 9/15 with right shoulder pain after sustaining a fall secondary to dizziness and found to have a right shoulder fracture.

## 2019-09-16 NOTE — CONSULT NOTE ADULT - SUBJECTIVE AND OBJECTIVE BOX
79y Male right hand dominant presents c/o R shoulder pain sp fall. Patient states he experienced dizziness after standing and fell onto TV stand. He admits to recent bouts of dizziness and multiple falls. Denies Headstrike/LOC. Denies numbness, tingling paresthesias in affected extremity. Able to ambulate after fall. No other orthopedic injuries at this time. patient scheduled for surgery with ENT on 9/19 for neck dissection.     PAST MEDICAL & SURGICAL HISTORY:  Malignant neoplasm of tongue  Afib  Pleural Effusion  Viral Meningitis  Diabetes Mellitus  Gallstones  History of bronchoscopy: right VAT; 2010  History of back surgery: 2016  H/O peripheral artery bypass: RLE; 2016  H/O aortic valve replacement: 2009  History of toe surgery: right 4th toe; 2016  Cataract: bilateral IOL  S/P Laparoscopic Cholecystectomy: 10/2009  Aortocoronary Artery Bypass Graft (ICD9 V45.81)  History of Aortic Valve Replacement (ICD9 V43.3)  S/P Tonsillectomy  History of Appendectomy    MEDICATIONS  (STANDING):    Allergies    No Known Allergies    Intolerances      REVIEW OF SYSTEMS:    CONSTITUTIONAL: No weakness, fevers or chills  EYES/ENT: No visual changes;  No vertigo  NECK: No pain or stiffness  RESPIRATORY: No cough, wheezing, hemoptysis; No shortness of breath  CARDIOVASCULAR: No chest pain or palpitations  NEUROLOGICAL: No numbness or weakness, occassional dizziness  SKIN: no rash, multiple bruises over lower extremities, left hand laceration  MUSCULOSKELETAL: right shoulder pain                            10.8   6.67  )-----------( 93       ( 16 Sep 2019 04:10 )             33.9     16 Sep 2019 04:10    144    |  105    |  22     ----------------------------<  170    4.7     |  28     |  1.42     Ca    10.0       16 Sep 2019 04:10    TPro  6.9    /  Alb  4.2    /  TBili  0.7    /  DBili  x      /  AST  16     /  ALT  10     /  AlkPhos  70     16 Sep 2019 04:10    PT/INR - ( 16 Sep 2019 04:10 )   PT: 15.1 SEC;   INR: 1.35          PTT - ( 16 Sep 2019 04:10 )  PTT:27.2 SEC    Imaging:   < from: Xray Humerus, Right (09.16.19 @ 03:47) >  Acute impacted fracture of the proximal humerus at the surgical neck.   Lucency also involves the greater tuberosity with associated cortical   irregularity. No glenohumeral joint dislocation. Moderate to severe AC   joint osteoarthrosis.      < end of copied text >      Vital Signs Last 24 Hrs  T(C): 37.1 (09-16-19 @ 08:30), Max: 37.1 (09-16-19 @ 08:30)  T(F): 98.7 (09-16-19 @ 08:30), Max: 98.7 (09-16-19 @ 08:30)  HR: 79 (09-16-19 @ 08:30) (74 - 84)  BP: 147/66 (09-16-19 @ 08:30) (135/58 - 154/62)  BP(mean): --  RR: 18 (09-16-19 @ 08:30) (16 - 18)  SpO2: 100% (09-16-19 @ 08:30) (97% - 100%)  Gen: NAD  RUE: Skin intact, +ecchymosis around humerus  + soft tissue swelling about arm, +TTP over proximal arm , no tenderness distally around elbow/forearm/wrist  AIN/PIN/U intact, +wrist flexion/extension  SILT M/U/R/Ax  2+ rad          A/P: 79y Male w right proximal humerus fractuer  Pain control  NWB R UE in sling  encourage active finger motion to help with swelling  no acute orthopedic intervention  Active movement of fingers/wrist/elbow encouraged  Follow up with Dr. Person within 2 weeks, call office for appointment [phone]  Case discussed with Dr. Person  no contraindication to ENT surgery if patient is to be in standard supine position, do not manipulate right arm (ENT paged to discuss)  patient being admitted to medicine for w/u of dizziness and multiple falls 79y Male right hand dominant presents c/o R shoulder pain sp fall. Patient states he experienced dizziness after standing and fell onto TV stand. He admits to recent bouts of dizziness and multiple falls. Denies Headstrike/LOC. Denies numbness, tingling paresthesias in affected extremity. Able to ambulate after fall. No other orthopedic injuries at this time. patient scheduled for surgery with ENT on 9/19 for neck dissection.     PAST MEDICAL & SURGICAL HISTORY:  Malignant neoplasm of tongue  Afib  Pleural Effusion  Viral Meningitis  Diabetes Mellitus  Gallstones  History of bronchoscopy: right VAT; 2010  History of back surgery: 2016  H/O peripheral artery bypass: RLE; 2016  H/O aortic valve replacement: 2009  History of toe surgery: right 4th toe; 2016  Cataract: bilateral IOL  S/P Laparoscopic Cholecystectomy: 10/2009  Aortocoronary Artery Bypass Graft (ICD9 V45.81)  History of Aortic Valve Replacement (ICD9 V43.3)  S/P Tonsillectomy  History of Appendectomy    MEDICATIONS  (STANDING):    Allergies    No Known Allergies    Intolerances      REVIEW OF SYSTEMS:    CONSTITUTIONAL: No weakness, fevers or chills  EYES/ENT: No visual changes;  No vertigo  NECK: No pain or stiffness  RESPIRATORY: No cough, wheezing, hemoptysis; No shortness of breath  CARDIOVASCULAR: No chest pain or palpitations  NEUROLOGICAL: No numbness or weakness, occassional dizziness  SKIN: no rash, multiple bruises over lower extremities, left hand laceration  MUSCULOSKELETAL: right shoulder pain                            10.8   6.67  )-----------( 93       ( 16 Sep 2019 04:10 )             33.9     16 Sep 2019 04:10    144    |  105    |  22     ----------------------------<  170    4.7     |  28     |  1.42     Ca    10.0       16 Sep 2019 04:10    TPro  6.9    /  Alb  4.2    /  TBili  0.7    /  DBili  x      /  AST  16     /  ALT  10     /  AlkPhos  70     16 Sep 2019 04:10    PT/INR - ( 16 Sep 2019 04:10 )   PT: 15.1 SEC;   INR: 1.35          PTT - ( 16 Sep 2019 04:10 )  PTT:27.2 SEC    Imaging:   < from: Xray Humerus, Right (09.16.19 @ 03:47) >  Acute impacted fracture of the proximal humerus at the surgical neck.   Lucency also involves the greater tuberosity with associated cortical   irregularity. No glenohumeral joint dislocation. Moderate to severe AC   joint osteoarthrosis.      < end of copied text >      Vital Signs Last 24 Hrs  T(C): 37.1 (09-16-19 @ 08:30), Max: 37.1 (09-16-19 @ 08:30)  T(F): 98.7 (09-16-19 @ 08:30), Max: 98.7 (09-16-19 @ 08:30)  HR: 79 (09-16-19 @ 08:30) (74 - 84)  BP: 147/66 (09-16-19 @ 08:30) (135/58 - 154/62)  BP(mean): --  RR: 18 (09-16-19 @ 08:30) (16 - 18)  SpO2: 100% (09-16-19 @ 08:30) (97% - 100%)  Gen: NAD  RUE: Skin intact, +ecchymosis around humerus  + soft tissue swelling about arm, +TTP over proximal arm , no tenderness distally around elbow/forearm/wrist  AIN/PIN/U intact, +wrist flexion/extension  SILT M/U/R/Ax  2+ rad          A/P: 79y Male w right proximal humerus fractuer  Pain control  NWB R UE in sling  encourage active finger motion to help with swelling  no acute orthopedic intervention  Active movement of fingers/wrist/elbow encouraged  Follow up with Dr. Person within 2 weeks, call office for appointment [phone]  Case discussed with Dr. Person  no contraindication to ENT surgery if patient is to be in standard supine position, do not manipulate right arm (ENT paged to discuss)  patient being admitted to medicine for w/u of dizziness and multiple falls  FU Right shoulder axillary view or CT to confirm no dislocation

## 2019-09-16 NOTE — H&P ADULT - NSHPOUTPATIENTPROVIDERS_GEN_ALL_CORE
Dr. Portillo Dr. Reveles  pmd  [phone]  Dr. Thierry Pena cardio  [phone]  Dr. Portillo Plastic Surgeon

## 2019-09-16 NOTE — ED PROVIDER NOTE - OBJECTIVE STATEMENT
PGY2/MD Jennifer. 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 Pt has been on chemo, radiation, and planned to undergo tumor resection this Thursday. Has had "dizzy spell" over the last 2-3 months, 1-2 times/day, each lasts 5-10 seconds. He fell today due to the spell. Recent PET scan with no evidence of brain meta. Pt's Cardiologist, Dr. Baer gave him surgical clearance. Denies black out, he feels spinning sensation every time he has the spell. Deniese chest pain, palpitation, or SOB. Using cane to walk, with right hand.    PCP: Barbara Frey PGY2/MD Jennifer. 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 Pt has been on chemo, radiation, and planned to undergo tumor resection this Thursday. Has had "dizzy spell" over the last 2-3 months, 1-2 times/day, each lasts 5-10 seconds. He fell today due to the spell. Recent PET scan with no evidence of brain meta. Pt's Cardiologist, Dr. Baer gave him surgical clearance. Denies black out, he feels spinning sensation every time he has the spell. Deniese chest pain, palpitation, or SOB. Using cane to walk, with right hand.    PCP: Dr. Frey

## 2019-09-16 NOTE — H&P ADULT - PROBLEM SELECTOR PLAN 5
will monitor FG, and continue home Levemir 26 units for now  high suspicion for hypoglycemic events A1c 7.0  at home Levemir 26 units  high suspicion for 2 hypoglycemic events  c/w Lantus 15 with SSI moderate correction   will titrate up accordingly A1c 7.0  at home Levemir 26 units  high suspicion for 2 hypoglycemic events  c/w Lantus 20 with SSI moderate correction   will titrate up accordingly

## 2019-09-16 NOTE — ED PROVIDER NOTE - PROGRESS NOTE DETAILS
PGY2/MD Jennifer. Spoke with marielena and wife at the bedside, possible shoulder fractrue, dispo accordingly. Cell for daughter, Bethany: [phone], Wife: [phone] Attending MD Saez.  Pt signed out to me in stable condition pending CT's/XR, R humerus cane arm TBA vs. 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 Pt comfortable, awaiting CT shoulder.  I called Dr Portillo office and left message Pt comfortable, awaiting CT shoulder.  I called Dr Portillo office and left message for call back. Attending MD Saez.  Ortho has seen pt and recommends sling of RUE.  Wife requesting admission 2/2 unsafe ambulation and pt now amenable given inability to ambulate 2/2 unsteady gait/RUE is arm he uses cane with .

## 2019-09-16 NOTE — H&P ADULT - PROBLEM SELECTOR PLAN 4
c/w with home toprolol 25 mg daily  patient was put on Eliquis then coumadin in the past however now will only take aspirin 325mg  CHADS Vasc: 4.8% stroke risk  explained to patient risks of not being on anticoagulation c/w with home toprolol 25 mg daily  patient was put on Eliquis then coumadin in the past however now will only take aspirin 325mg  CHADS Vasc: 4.8% stroke risk  explained to patient risks of not being on anticoagulation  spoke to cardiologist and updated him, pt had not vocalized complaints of dizziness while being cleared. ECHO was performed but unable to send over report  will reorder ECHO

## 2019-09-16 NOTE — CONSULT NOTE ADULT - ATTENDING COMMENTS
I agree with the above note on this patient. All pertinent films have been reviewed. Please refer to clinical documentation of the history, physical examinations, data summary, and both assessment and plan as documented above and with which I agree.    prox hum fx, nwb, sling, pendulums, outpatient f/u    Jin Person MD  Attending Orthopedic Surgeon I agree with the above note and have personally seen and examined this patient. All pertinent films have been reviewed. Please refer to clinical documentation of the history, physical examinations, data summary, and both assessment and plan as documented above and with which I agree.    79M RHD R shoulder pain s/p FOOSH. XR R shoulder demosntrate proximal humerus fx.  Firing d/b/t/we/wf/g/io  silt a/m/r/u  2+ rp  aborted plan for ent procedure today 2/2 shoulder pain  nwb, sling, pendulums, outpatient f/u    Jin Person MD  Attending Orthopedic Surgeon

## 2019-09-16 NOTE — ED ADULT NURSE REASSESSMENT NOTE - NS ED NURSE REASSESS COMMENT FT1
Pt. received in spot 17A, report from [facility] AZUL. Pt. s/p fall with a r Humerus fx. noted with ecchymotic area to R upper arm, arm in sling. no c/o pain at this time. maintain on fall precaution. L 20g  intact. no redness or swelling noted. Pt. admitted to Med. for closed fracture R humerus.

## 2019-09-16 NOTE — ED ADULT NURSE REASSESSMENT NOTE - NS ED NURSE REASSESS COMMENT FT1
Pt. states he is only in pain if he moves his shoulder. Awaiting CT at present. 20 gauge IV inserted in left upper arm, positive blood return, flushes without difficulty. Resting comfortably on cardiac monitor. Will continue to monitor.

## 2019-09-16 NOTE — H&P ADULT - NSICDXPASTSURGICALHX_GEN_ALL_CORE_FT
PAST SURGICAL HISTORY:  Aortocoronary Artery Bypass Graft (ICD9 V45.81)     Cataract bilateral IOL    H/O aortic valve replacement 2009    H/O peripheral artery bypass RLE; 2016    History of Aortic Valve Replacement (ICD9 V43.3)     History of Appendectomy     History of back surgery 2016    History of bronchoscopy right VAT; 2010    History of toe surgery right 4th toe; 2016    S/P Laparoscopic Cholecystectomy 10/2009    S/P Tonsillectomy

## 2019-09-16 NOTE — ED ADULT NURSE REASSESSMENT NOTE - NS ED NURSE REASSESS COMMENT FT1
Pt. noted with FS 57. Med. team 4 "[id number] paged. Dextrose 40% gel given as per hypoglycemia protocol, sandwich and juice given.  Pt. A&O x4

## 2019-09-16 NOTE — H&P ADULT - NSHPPHYSICALEXAM_GEN_ALL_CORE
PHYSICAL EXAM  Vital Signs (24 Hrs):  T(C): 36.7 (09-16-19 @ 12:12), Max: 37.1 (09-16-19 @ 08:30)  HR: 79 (09-16-19 @ 12:12) (74 - 84)  BP: 141/50 (09-16-19 @ 12:12) (135/58 - 154/62)  RR: 16 (09-16-19 @ 12:12) (16 - 18)  SpO2: 98% (09-16-19 @ 12:12) (97% - 100%)  Wt(kg): --  Daily     Daily     I&O's Summary    GENERAL: NAD, lying comfortably in bed   HEAD:  Atraumatic, Normocephalic  EYES: EOMI b/l, PERRLA b/l, conjunctiva and sclera clear  NECK: hard, No JVD, anterior cervical nodes present to palpation although nontender   CHEST/LUNG: Clear to auscultation bilaterally; No wheeze or ronchi  HEART: irregular rate and rhythm; S1 and S2 present, No murmurs, rubs, or gallops  ABDOMEN: Soft, Nontender, Nondistended; Bowel sounds present  EXTREMITIES:  2+ Peripheral Pulses, No clubbing, cyanosis, or edema, right arm in sling, right shoulder is TTP, ROM greatly limited due to pain,  left wrist skin is broken due to recent fall with some mild bleeding, no lower leg edema although some discoloration noticed on right shin, with scars of past SVG done on the right LE  NEURO: AAOx3, non-focal   SKIN: No rashes or lesions

## 2019-09-16 NOTE — ED ADULT NURSE NOTE - PMH
Afib    Diabetes Mellitus    Gallstones    Malignant neoplasm of tongue    Pleural Effusion    Viral Meningitis

## 2019-09-16 NOTE — CONSULT NOTE ADULT - ASSESSMENT
RH 79 y M who presents to the ED on the evening of 9/15/19 regarding complaints of right shoulder pain after sustaining a fall secondary to dizziness. Patient denies any LOC or head trauma during this fall. 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 Patient harbors a past medical history significant for  CAD s/p CABG and AVR, Afib not on AC, IDDM2, SCC of the right neck dxed in 12/2018 s/p 35 rounds of RT and 7 rounds of chemo. Patient denies specific triggers although notices that last night the dizziness occurred when he turned his head quickly. Endorses moments of light headedness when he gets up quickly from a chair and endorses some visual floaters but notes he experiences sudden onset of vertigo when at rest on occasion. Patient at baseline currently, not vertiginousCTA neck: Atheromatous irregularity and mild stenoses of the origins of the ICAs bilaterally measuring less than 50%. Atheromatous calcification along the aortic arch and origin of the great vessels in bilateral vertebral arteries, likely contributing to focal vertebral stenoses. Patient appears to be suffering from bouts of presyncope likely multifactorial in nature but related to evidence of posterior circulation stenoses. DDx: Presyncope 2/2 to vertebrobasilar insufficiency vs orthostatic hypotension    Impression: Presyncope 2/2 to vertebrobasilar insufficiency vs orthostatic hypotension v TIA      Plan:  MRI Brain w/ and w/o  Begin ASA at least 81mg and Plavix 75mg qd po  Atorvastatin 80mg qhs po  Orthostatics TID for at least 2 days  PT/OT when patient deemed stable

## 2019-09-16 NOTE — CONSULT NOTE ADULT - SUBJECTIVE AND OBJECTIVE BOX
HPI:  Patient is a RH 79 y M who presents to the ED on the evening of 9/15/19 regarding complaints of right shoulder pain after sustaining a fall secondary to dizziness. Patient denies any LOC or head trauma during this fall. 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 Patient harbors a past medical history significant for  CAD s/p CABG and AVR, Afib not on AC, IDDM2, SCC of the right neck dxed in 12/2018 s/p 35 rounds of RT and 7 rounds of chemo. Patient denies specific triggers although notices that last night the dizziness occurred when he turned his head quickly. Endorses moments of light headedness when he gets up quickly from a chair and endorses some visual floaters but notes he experiences sudden onset of vertigo when at rest on occasion. Patient does experience occasional episodes of hypoglycemia (2 in the last month) where his blood sugars can go to 50s and will experience lethargy and tremors however usually his blood sugars run in the 90-150s. Patient does not regularly check his blood pressures but have noticed that they have been <120/80 during his recent visits. Patient denies CP, SOB, or palpitations. Patient does endorse poor po intake of water, and loss of appetite due to loss of taste after radiation, however weight has stabilized after initial drop of 40 lbs and has some constipation at baseline.  Patient is scheduled for an bx and resection for SCC of the neck with Dr. Urbina for Thursday and has been completing multiple preop evaluations. Patient denies any fevers, chills, abdominal pain, diarrhea, urinary retention, dysuria, cough, headache. Neurology consulted to evaluate vertigo and sensations of pre-syncope    A 10-system ROS was performed and is negative except for those items noted above and/or in the HPI.    PAST MEDICAL & SURGICAL HISTORY:  Malignant neoplasm of tongue  Afib  Pleural Effusion  Viral Meningitis  Diabetes Mellitus  Gallstones  History of bronchoscopy: right VAT; 2010  History of back surgery: 2016  H/O peripheral artery bypass: RLE; 2016  H/O aortic valve replacement: 2009  History of toe surgery: right 4th toe; 2016  Cataract: bilateral IOL  S/P Laparoscopic Cholecystectomy: 10/2009  Aortocoronary Artery Bypass Graft (ICD9 V45.81)  History of Aortic Valve Replacement (ICD9 V43.3)  S/P Tonsillectomy  History of Appendectomy    MEDICATIONS (HOME):  Home Medications:  Ecotrin 325 mg oral delayed release tablet: 1 tab(s) orally once a day (16 Sep 2019 13:36)  Levemir 100 units/mL subcutaneous solution: 26 unit(s) subcutaneous once a day (at bedtime) (16 Sep 2019 14:17)  metoprolol succinate 25 mg oral tablet, extended release: 1 tab(s) orally once a day (16 Sep 2019 13:36)    MEDICATIONS  (STANDING):  aspirin enteric coated 325 milliGRAM(s) Oral daily  dextrose 5%. 1000 milliLiter(s) (50 mL/Hr) IV Continuous <Continuous>  dextrose 50% Injectable 12.5 Gram(s) IV Push once  dextrose 50% Injectable 25 Gram(s) IV Push once  dextrose 50% Injectable 25 Gram(s) IV Push once  insulin detemir injectable (LEVEMIR) 26 Unit(s) SubCutaneous at bedtime  insulin lispro (HumaLOG) corrective regimen sliding scale   SubCutaneous three times a day before meals  metoprolol succinate ER 25 milliGRAM(s) Oral daily    MEDICATIONS  (PRN):  acetaminophen   Tablet .. 975 milliGRAM(s) Oral every 6 hours PRN Mild Pain (1 - 3), Moderate Pain (4 - 6)  dextrose 40% Gel 15 Gram(s) Oral once PRN Blood Glucose LESS THAN 70 milliGRAM(s)/deciliter  glucagon  Injectable 1 milliGRAM(s) IntraMuscular once PRN Glucose LESS THAN 70 milligrams/deciliter    ALLERGIES/INTOLERANCES:  Allergies  No Known Allergies    Intolerances    VITALS & EXAMINATION:  Vital Signs Last 24 Hrs  T(C): 36.8 (16 Sep 2019 16:10), Max: 37.1 (16 Sep 2019 08:30)  T(F): 98.2 (16 Sep 2019 16:10), Max: 98.7 (16 Sep 2019 08:30)  HR: 77 (16 Sep 2019 16:10) (74 - 84)  BP: 150/61 (16 Sep 2019 16:10) (135/58 - 154/62)  BP(mean): --  RR: 16 (16 Sep 2019 16:10) (16 - 18)  SpO2: 99% (16 Sep 2019 16:10) (97% - 100%)    General:  Constitutional: Obese Male, appears stated age, in no apparent distress including pain  Head: Normocephalic & atraumatic.    Neurological (>12):  MS: Resting, sleeping at time of visit, easily arousable to verbal stimuli. Awake, alert, oriented to person, place, situation, time. Normal affect. Follows all commands.    Language: Speech is clear with buccal dysarthria secondary to lack of dentures,  fluent with good repetition & comprehension.     CNs: PERRLA (R = 3mm, L = 3mm). VFF. EOMI. Fatigable Left beating nystagmus present when looking to right. no diplopia. V1-3 intact to LT/pinprick, well developed masseter muscles b/l. No facial asymmetry b/l, full eye closure strength b/l. Hearing grossly normal (rubbing fingers) b/l. Symmetric palate elevation in midline. Gag reflex deferred. Head turning & shoulder shrug intact b/l. Tongue midline, normal movements, no atrophy.      Motor: Normal muscle bulk & tone. Mild resting tremor present. No pronator drift. RUE drift not tested given sling, can squeeze fingers      Sensation: Intact to LT/PP  b/l throughout.     Cortical: Extinction on DSS (neglect): none    Reflexes:              Biceps(C5)       BR(C6)     Triceps(C7)               Patellar(L4)    Achilles(S1)    Plantar Resp  R		          	             		        2		    1		Down   L	2	          2	             2		        2		    1		Down     Coordination: intact rapid-alt movements. No dysmetria to FTN/HTS      LABORATORY:  CBC                       10.8   6.67  )-----------( 93       ( 16 Sep 2019 04:10 )             33.9     Chem 09-16    144  |  105  |  22  ----------------------------<  170<H>  4.7   |  28  |  1.42<H>    Ca    10.0      16 Sep 2019 04:10    TPro  6.9  /  Alb  4.2  /  TBili  0.7  /  DBili  x   /  AST  16  /  ALT  10  /  AlkPhos  70  09-16    LFTs LIVER FUNCTIONS - ( 16 Sep 2019 04:10 )  Alb: 4.2 g/dL / Pro: 6.9 g/dL / ALK PHOS: 70 u/L / ALT: 10 u/L / AST: 16 u/L / GGT: x           Coagulopathy PT/INR - ( 16 Sep 2019 04:10 )   PT: 15.1 SEC;   INR: 1.35          PTT - ( 16 Sep 2019 04:10 )  PTT:27.2 SEC  Lipid Panel   A1c 09-13 [id number] 7.0    Cardiac enzymes     Radiology (XR, CT, MR, U/S, TTE/ASHTYN):    < from: CT Angio Head w/ IV Cont (09.16.19 @ 07:10) >  IMPRESSION:     CT BRAIN:  No acute intracranial mass effect, bleed, shift.    CTA NECK: Atheromatous irregularity and mild stenoses of the origins of   the ICAs bilaterally measuring less than 50%.    Atheromatous calcification along the aortic arch and origin of the great   vessels in bilateral vertebral arteries, likely contributing to focal   vertebral stenoses.    CTA BRAIN: Atheromatous irregularity and mild stenoses of the cavernous   ICAs bilaterally.    < end of copied text >

## 2019-09-16 NOTE — ED PROVIDER NOTE - CLINICAL SUMMARY MEDICAL DECISION MAKING FREE TEXT BOX
PGY2/MD Jennifer. 80 yo M with DM, AVR, HTN, SCC with planned resection on Thursday, p/w right shoulder pain, localized, strong tenderness, likely fracture. will get xray, also concerning for vertebral or carotid arterial pathology for multple "dizziness", may be pre-syncope. communicate with ENT, Cardiologist, likely admission non ambulatory due to right hand injury.

## 2019-09-16 NOTE — H&P ADULT - ATTENDING COMMENTS
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   recent PET and CT-A neg for mets, bleeds, tumors  known Afib, non compliant with AC.   would monitor on tele to monitor rhythm, check trops, will contact out-pt cards who recently cleared pt for planned OR - obtain recent TTE report  Neuro eval, ? need MR imaging.   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   recent PET and CT-A neg for mets, bleeds, tumors  known Afib, non compliant with AC.   would monitor on tele to monitor rhythm, check trops, will contact out-pt cards who recently cleared pt for planned OR - obtain recent TTE report  Neuro eval, ? need MR imaging.   noam ortho, no intervention, PT eval  will dw PMD, ENT

## 2019-09-17 LAB
ALBUMIN SERPL ELPH-MCNC: 4 G/DL — SIGNIFICANT CHANGE UP (ref 3.3–5)
ALP SERPL-CCNC: 75 U/L — SIGNIFICANT CHANGE UP (ref 40–120)
ALT FLD-CCNC: 12 U/L — SIGNIFICANT CHANGE UP (ref 4–41)
ANION GAP SERPL CALC-SCNC: 12 MMO/L — SIGNIFICANT CHANGE UP (ref 7–14)
AST SERPL-CCNC: 18 U/L — SIGNIFICANT CHANGE UP (ref 4–40)
BILIRUB SERPL-MCNC: 1 MG/DL — SIGNIFICANT CHANGE UP (ref 0.2–1.2)
BUN SERPL-MCNC: 23 MG/DL — SIGNIFICANT CHANGE UP (ref 7–23)
CALCIUM SERPL-MCNC: 9.7 MG/DL — SIGNIFICANT CHANGE UP (ref 8.4–10.5)
CHLORIDE SERPL-SCNC: 102 MMOL/L — SIGNIFICANT CHANGE UP (ref 98–107)
CO2 SERPL-SCNC: 25 MMOL/L — SIGNIFICANT CHANGE UP (ref 22–31)
CREAT SERPL-MCNC: 1.36 MG/DL — HIGH (ref 0.5–1.3)
GLUCOSE BLDC GLUCOMTR-MCNC: 127 MG/DL — HIGH (ref 70–99)
GLUCOSE BLDC GLUCOMTR-MCNC: 135 MG/DL — HIGH (ref 70–99)
GLUCOSE BLDC GLUCOMTR-MCNC: 170 MG/DL — HIGH (ref 70–99)
GLUCOSE BLDC GLUCOMTR-MCNC: 189 MG/DL — HIGH (ref 70–99)
GLUCOSE SERPL-MCNC: 210 MG/DL — HIGH (ref 70–99)
HCT VFR BLD CALC: 30.7 % — LOW (ref 39–50)
HGB BLD-MCNC: 10.3 G/DL — LOW (ref 13–17)
MAGNESIUM SERPL-MCNC: 1.8 MG/DL — SIGNIFICANT CHANGE UP (ref 1.6–2.6)
MCHC RBC-ENTMCNC: 30 PG — SIGNIFICANT CHANGE UP (ref 27–34)
MCHC RBC-ENTMCNC: 33.6 % — SIGNIFICANT CHANGE UP (ref 32–36)
MCV RBC AUTO: 89.5 FL — SIGNIFICANT CHANGE UP (ref 80–100)
NRBC # FLD: 0 K/UL — SIGNIFICANT CHANGE UP (ref 0–0)
PHOSPHATE SERPL-MCNC: 2.7 MG/DL — SIGNIFICANT CHANGE UP (ref 2.5–4.5)
PLATELET # BLD AUTO: 85 K/UL — LOW (ref 150–400)
PMV BLD: 10.4 FL — SIGNIFICANT CHANGE UP (ref 7–13)
POTASSIUM SERPL-MCNC: 4.6 MMOL/L — SIGNIFICANT CHANGE UP (ref 3.5–5.3)
POTASSIUM SERPL-SCNC: 4.6 MMOL/L — SIGNIFICANT CHANGE UP (ref 3.5–5.3)
PROT SERPL-MCNC: 6.7 G/DL — SIGNIFICANT CHANGE UP (ref 6–8.3)
RBC # BLD: 3.43 M/UL — LOW (ref 4.2–5.8)
RBC # FLD: 13.9 % — SIGNIFICANT CHANGE UP (ref 10.3–14.5)
SODIUM SERPL-SCNC: 139 MMOL/L — SIGNIFICANT CHANGE UP (ref 135–145)
WBC # BLD: 6.28 K/UL — SIGNIFICANT CHANGE UP (ref 3.8–10.5)
WBC # FLD AUTO: 6.28 K/UL — SIGNIFICANT CHANGE UP (ref 3.8–10.5)

## 2019-09-17 PROCEDURE — 93306 TTE W/DOPPLER COMPLETE: CPT | Mod: 26

## 2019-09-17 PROCEDURE — 99223 1ST HOSP IP/OBS HIGH 75: CPT

## 2019-09-17 PROCEDURE — 99222 1ST HOSP IP/OBS MODERATE 55: CPT

## 2019-09-17 PROCEDURE — 99233 SBSQ HOSP IP/OBS HIGH 50: CPT | Mod: GC

## 2019-09-17 PROCEDURE — 99221 1ST HOSP IP/OBS SF/LOW 40: CPT

## 2019-09-17 PROCEDURE — 93010 ELECTROCARDIOGRAM REPORT: CPT

## 2019-09-17 RX ORDER — HYDROMORPHONE HYDROCHLORIDE 2 MG/ML
0.5 INJECTION INTRAMUSCULAR; INTRAVENOUS; SUBCUTANEOUS ONCE
Refills: 0 | Status: DISCONTINUED | OUTPATIENT
Start: 2019-09-17 | End: 2019-09-17

## 2019-09-17 RX ORDER — INFLUENZA VIRUS VACCINE 15; 15; 15; 15 UG/.5ML; UG/.5ML; UG/.5ML; UG/.5ML
0.5 SUSPENSION INTRAMUSCULAR ONCE
Refills: 0 | Status: DISCONTINUED | OUTPATIENT
Start: 2019-09-17 | End: 2019-09-24

## 2019-09-17 RX ORDER — ACETAMINOPHEN WITH CODEINE 300MG-30MG
1 TABLET ORAL EVERY 4 HOURS
Refills: 0 | Status: DISCONTINUED | OUTPATIENT
Start: 2019-09-17 | End: 2019-09-22

## 2019-09-17 RX ORDER — INSULIN GLARGINE 100 [IU]/ML
15 INJECTION, SOLUTION SUBCUTANEOUS AT BEDTIME
Refills: 0 | Status: DISCONTINUED | OUTPATIENT
Start: 2019-09-17 | End: 2019-09-20

## 2019-09-17 RX ADMIN — CLOPIDOGREL BISULFATE 75 MILLIGRAM(S): 75 TABLET, FILM COATED ORAL at 07:38

## 2019-09-17 RX ADMIN — OXYCODONE HYDROCHLORIDE 5 MILLIGRAM(S): 5 TABLET ORAL at 07:37

## 2019-09-17 RX ADMIN — OXYCODONE HYDROCHLORIDE 5 MILLIGRAM(S): 5 TABLET ORAL at 08:07

## 2019-09-17 RX ADMIN — Medication 2: at 09:19

## 2019-09-17 RX ADMIN — HYDROMORPHONE HYDROCHLORIDE 0.5 MILLIGRAM(S): 2 INJECTION INTRAMUSCULAR; INTRAVENOUS; SUBCUTANEOUS at 04:37

## 2019-09-17 RX ADMIN — INSULIN GLARGINE 15 UNIT(S): 100 INJECTION, SOLUTION SUBCUTANEOUS at 22:26

## 2019-09-17 RX ADMIN — HYDROMORPHONE HYDROCHLORIDE 0.5 MILLIGRAM(S): 2 INJECTION INTRAMUSCULAR; INTRAVENOUS; SUBCUTANEOUS at 05:00

## 2019-09-17 RX ADMIN — Medication 25 MILLIGRAM(S): at 04:38

## 2019-09-17 RX ADMIN — Medication 325 MILLIGRAM(S): at 07:38

## 2019-09-17 NOTE — PROGRESS NOTE ADULT - SUBJECTIVE AND OBJECTIVE BOX
Boston Tolentino MD, PGY1  [phone]        Patient is a 79y old  Male who presents with a chief complaint of dizziness (16 Sep 2019 16:22)        SUBJECTIVE / OVERNIGHT EVENTS: Patient had no acute events overnight. Patient seen and examined at bedside this morning.     ROS: [ - ] Fever [ - ] Chills [ - ] Nausea/Vomiting [ - ] Chest Pain [ - ] Shortness of breath     MEDICATIONS  (STANDING):  aspirin enteric coated 325 milliGRAM(s) Oral daily  clopidogrel Tablet 75 milliGRAM(s) Oral daily  dextrose 5%. 1000 milliLiter(s) (50 mL/Hr) IV Continuous <Continuous>  dextrose 50% Injectable 12.5 Gram(s) IV Push once  dextrose 50% Injectable 25 Gram(s) IV Push once  dextrose 50% Injectable 25 Gram(s) IV Push once  influenza   Vaccine 0.5 milliLiter(s) IntraMuscular once  insulin glargine Injectable (LANTUS) 20 Unit(s) SubCutaneous at bedtime  insulin lispro (HumaLOG) corrective regimen sliding scale   SubCutaneous three times a day before meals  metoprolol succinate ER 25 milliGRAM(s) Oral daily    MEDICATIONS  (PRN):  acetaminophen   Tablet .. 975 milliGRAM(s) Oral every 6 hours PRN Mild Pain (1 - 3), Moderate Pain (4 - 6)  dextrose 40% Gel 15 Gram(s) Oral once PRN Blood Glucose LESS THAN 70 milliGRAM(s)/deciliter  glucagon  Injectable 1 milliGRAM(s) IntraMuscular once PRN Glucose LESS THAN 70 milligrams/deciliter  oxyCODONE    IR 5 milliGRAM(s) Oral every 4 hours PRN Severe Pain (7 - 10)      Vital Signs Last 24 Hrs  T(C): 37.1 (17 Sep 2019 04:33), Max: 37.3 (16 Sep 2019 22:26)  T(F): 98.7 (17 Sep 2019 04:33), Max: 99.2 (16 Sep 2019 22:26)  HR: 95 (17 Sep 2019 04:33) (74 - 95)  BP: 154/71 (17 Sep 2019 04:33) (139/78 - 155/69)  BP(mean): --  RR: 18 (17 Sep 2019 04:33) (16 - 18)  SpO2: 100% (17 Sep 2019 04:33) (98% - 100%)  CAPILLARY BLOOD GLUCOSE      POCT Blood Glucose.: 204 mg/dL (16 Sep 2019 22:56)  POCT Blood Glucose.: 57 mg/dL (16 Sep 2019 21:59)  POCT Blood Glucose.: 145 mg/dL (16 Sep 2019 16:29)    I&O's Summary      PHYSICAL EXAM  GENERAL: NAD, lying comfortably in bed   HEAD:  Atraumatic, Normocephalic  EYES: EOMI, PERRLA, conjunctiva and sclera clear  NECK: Supple, No JVD  CHEST/LUNG: Clear to auscultation bilaterally; No wheeze  HEART: Regular rate and rhythm; No murmurs, rubs, or gallops  ABDOMEN: Soft, Nontender, Nondistended; Bowel sounds present  EXTREMITIES:  2+ Peripheral Pulses, No clubbing, cyanosis, or edema  NEURO: AAOx3, non-focal  SKIN: No rashes or lesions      LABS:                        10.8   6.67  )-----------( 93       ( 16 Sep 2019 04:10 )             33.9     09-16    144  |  105  |  22  ----------------------------<  170<H>  4.7   |  28  |  1.42<H>    Ca    10.0      16 Sep 2019 04:10    TPro  6.9  /  Alb  4.2  /  TBili  0.7  /  DBili  x   /  AST  16  /  ALT  10  /  AlkPhos  70  09-16    PT/INR - ( 16 Sep 2019 04:10 )   PT: 15.1 SEC;   INR: 1.35          PTT - ( 16 Sep 2019 04:10 )  PTT:27.2 SEC            RADIOLOGY & ADDITIONAL TESTS:    Imaging Personally Reviewed:  Consultant(s) Notes Reviewed: Boston Tolentino MD, PGY1  [phone]        Patient is a 79y old  Male who presents with a chief complaint of dizziness (16 Sep 2019 16:22)      SUBJECTIVE / OVERNIGHT EVENTS: Patient had mild delirium overnight. Patient seen and examined at bedside this morning. Reports no sxs overnight. Has been informed of delay for surgery as dizziness must be evaluated prior to clearance    ROS: [ - ] Fever [ - ] Chills [ - ] Nausea/Vomiting [ - ] Chest Pain [ - ] Shortness of breath [-] abdominal pain, dysuria, leg swelling, diarrhea, constipation    MEDICATIONS  (STANDING):  aspirin enteric coated 325 milliGRAM(s) Oral daily  clopidogrel Tablet 75 milliGRAM(s) Oral daily  dextrose 5%. 1000 milliLiter(s) (50 mL/Hr) IV Continuous <Continuous>  dextrose 50% Injectable 12.5 Gram(s) IV Push once  dextrose 50% Injectable 25 Gram(s) IV Push once  dextrose 50% Injectable 25 Gram(s) IV Push once  influenza   Vaccine 0.5 milliLiter(s) IntraMuscular once  insulin glargine Injectable (LANTUS) 20 Unit(s) SubCutaneous at bedtime  insulin lispro (HumaLOG) corrective regimen sliding scale   SubCutaneous three times a day before meals  metoprolol succinate ER 25 milliGRAM(s) Oral daily    MEDICATIONS  (PRN):  acetaminophen   Tablet .. 975 milliGRAM(s) Oral every 6 hours PRN Mild Pain (1 - 3), Moderate Pain (4 - 6)  dextrose 40% Gel 15 Gram(s) Oral once PRN Blood Glucose LESS THAN 70 milliGRAM(s)/deciliter  glucagon  Injectable 1 milliGRAM(s) IntraMuscular once PRN Glucose LESS THAN 70 milligrams/deciliter  oxyCODONE    IR 5 milliGRAM(s) Oral every 4 hours PRN Severe Pain (7 - 10)      Vital Signs Last 24 Hrs  T(C): 37.1 (17 Sep 2019 04:33), Max: 37.3 (16 Sep 2019 22:26)  T(F): 98.7 (17 Sep 2019 04:33), Max: 99.2 (16 Sep 2019 22:26)  HR: 95 (17 Sep 2019 04:33) (74 - 95)  BP: 154/71 (17 Sep 2019 04:33) (139/78 - 155/69)  BP(mean): --  RR: 18 (17 Sep 2019 04:33) (16 - 18)  SpO2: 100% (17 Sep 2019 04:33) (98% - 100%)  CAPILLARY BLOOD GLUCOSE      POCT Blood Glucose.: 204 mg/dL (16 Sep 2019 22:56)  POCT Blood Glucose.: 57 mg/dL (16 Sep 2019 21:59)  POCT Blood Glucose.: 145 mg/dL (16 Sep 2019 16:29)    I&O's Summary      PHYSICAL EXAM  GENERAL: NAD, lying comfortably in bed   HEAD:  Atraumatic, Normocephalic  EYES: EOMI b/l, PERRLA b/l, conjunctiva and sclera clear  NECK: hard, No JVD, anterior cervical nodes present to palpation although nontender   CHEST/LUNG: Clear to auscultation bilaterally; No wheeze or ronchi  HEART: irregular rate and rhythm; S1 and S2 present, No murmurs, rubs, or gallops  ABDOMEN: Soft, Nontender, Nondistended; Bowel sounds present  EXTREMITIES:  2+ Peripheral Pulses, No clubbing, cyanosis, or edema, right arm in sling, right shoulder is TTP, ROM greatly limited due to pain,  left wrist skin is broken due to recent fall with some mild bleeding, no lower legedema although some discoloration noticed on right shin, with scars of past SVG done on the right LE  NEURO: AAOx3, non-focal   SKIN: No rashes or lesion skin tags on chest      LABS:                        10.8   6.67  )-----------( 93       ( 16 Sep 2019 04:10 )             33.9     09-16    144  |  105  |  22  ----------------------------<  170<H>  4.7   |  28  |  1.42<H>    Ca    10.0      16 Sep 2019 04:10    TPro  6.9  /  Alb  4.2  /  TBili  0.7  /  DBili  x   /  AST  16  /  ALT  10  /  AlkPhos  70  09-16    PT/INR - ( 16 Sep 2019 04:10 )   PT: 15.1 SEC;   INR: 1.35          PTT - ( 16 Sep 2019 04:10 )  PTT:27.2 SEC            RADIOLOGY & ADDITIONAL TESTS:    Imaging Personally Reviewed:  Consultant(s) Notes Reviewed:

## 2019-09-17 NOTE — PROGRESS NOTE ADULT - PROBLEM SELECTOR PLAN 4
c/w with home toprolol 25 mg daily  patient was put on Eliquis then coumadin in the past however now will only take aspirin 325mg  CHADS Vasc: 4.8% stroke risk  explained to patient risks of not being on anticoagulation  spoke to cardiologist and updated him, pt had not vocalized complaints of dizziness while being cleared. ECHO was performed but unable to send over report  will reorder ECHO c/w with home toprolol 25 mg daily  patient was put on Eliquis then coumadin in the past however now will only take aspirin 325mg  CHADS Vasc: 4.8% stroke risk  explained to patient risks of not being on anticoagulation  spoke to cardiologist and updated him, pt had not vocalized complaints of dizziness while being cleared. ECHO was performed but unable to send over report  will reorder ECHO  apprec Card recs: ordered stress test and c/s EP, carotid duplex for possible steal c/w with home toprolol 25 mg daily  patient was put on Eliquis then coumadin in the past however now will only take aspirin 325mg  CHADS Vasc: 4.8% stroke risk  explained to patient risks of not being on anticoagulation  spoke to cardiologist and updated him, pt had not vocalized complaints of dizziness while being cleared. ECHO was performed but unable to send over report  TTE: poor study may require ASHTYN to better visualize valve and chambers  apprec Card recs: ordered stress test and c/s EP, carotid duplex for possible steal

## 2019-09-17 NOTE — PROGRESS NOTE ADULT - ATTENDING COMMENTS
wife at bedside concerned that oxy IR is sedating and confusing opt too much - reportedly with delirium overnight. she prefers Tylenol w codeine instead  noam neuro, will proceed with [facility], Duplex of carotids to evaluate posterior circulation, r/o CVA, mets. orthostatics were neg  noam cards - pt declining AC, on ASA, Plavix. repeat TTE, EP for LBBB  per plastics sx OR deferred for now   noam ortho, no sx interventions for fx  PT/OT eval wife at bedside concerned that oxy IR is sedating and confusing opt too much - reportedly with delirium overnight. she prefers Tylenol w codeine instead  noam neuro, will proceed with MRH, Duplex of carotids to evaluate posterior circulation, r/o CVA, mets. orthostatics were neg  noam cards - pt declining AC, on ASA, Plavix. repeat TTE, EP for LBBB  per plastics sx OR deferred for now   noam ortho, no sx interventions for fx  DM2 with hypoglycemia - decrease Lantus to 15 units QHS   PT/OT eval

## 2019-09-17 NOTE — PROGRESS NOTE ADULT - PROBLEM SELECTOR PLAN 5
A1c 7.0  at home Levemir 26 units  high suspicion for 2 hypoglycemic events  c/w Lantus 20 with SSI moderate correction   will titrate up accordingly A1c 7.0  at home Levemir 26 units  high suspicion for 2 hypoglycemic events  will decrease to 15 units Lantus

## 2019-09-17 NOTE — CONSULT NOTE ADULT - SUBJECTIVE AND OBJECTIVE BOX
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 Patient denies any LOC or head trauma during this fall. Patient has been feeling dizzy and sustaining mild falls or loss of balance for 3-4 months (8-9 falls) but has not yet been evaluated for it. Patient denies specific triggers although notices that last night the dizziness occurred when he turned his head quickly. The dizziness he experiences is a sense of the room spinning although he does have moments of light headedness when he gets up quickly from a chair and endorses some visual floaters. Patient also feels a loss of balance when he is shaving. Patient does experience occasional episodes of hypoglycemia (2 in the last month) where his blood sugars can go to 50s and will experience lethargy and tremors however usually his blood sugars run in the 90-150s. Patient does not regularly check his blood pressures but have noticed that they have been <120/80 during his recent visits. Patient denies CP, SOB, or palpitations. Patient does endorse poor po intake of water, and loss of appetite due to loss of taste after radiation, however weight has stabilized after initial drop of 40 lbs and has some constipation at baseline.  Patient is scheduled for an bx and resection for SCC of the neck with Dr. Urbina for Thursday and has been completing multiple preop evaluations. Patient denies any fevers, chills, abdominal pain, diarrhea, urinary retention, dysuria, cough, headache.    ED course: found to have sustained a Acute comminuted impacted fracture at the surgical neck of the right   humerus. No glenohumeral dislocation        PAST MEDICAL & SURGICAL HISTORY:  Malignant neoplasm of tongue  Afib  Pleural Effusion  Viral Meningitis  Diabetes Mellitus  Gallstones  History of bronchoscopy: right VAT; 2010  History of back surgery: 2016  H/O peripheral artery bypass: RLE; 2016  H/O aortic valve replacement:   History of toe surgery: right 4th toe;   Cataract: bilateral IOL  S/P Laparoscopic Cholecystectomy: 10/2009  Aortocoronary Artery Bypass Graft (ICD9 V45.81)  History of Aortic Valve Replacement (ICD9 V43.3)  S/P Tonsillectomy  History of Appendectomy      HPI:                    MEDICATIONS  (STANDING):  aspirin enteric coated 325 milliGRAM(s) Oral daily  clopidogrel Tablet 75 milliGRAM(s) Oral daily  dextrose 5%. 1000 milliLiter(s) (50 mL/Hr) IV Continuous <Continuous>  dextrose 50% Injectable 12.5 Gram(s) IV Push once  dextrose 50% Injectable 25 Gram(s) IV Push once  dextrose 50% Injectable 25 Gram(s) IV Push once  influenza   Vaccine 0.5 milliLiter(s) IntraMuscular once  insulin glargine Injectable (LANTUS) 15 Unit(s) SubCutaneous at bedtime  insulin lispro (HumaLOG) corrective regimen sliding scale   SubCutaneous three times a day before meals  metoprolol succinate ER 25 milliGRAM(s) Oral daily    MEDICATIONS  (PRN):  acetaminophen   Tablet .. 975 milliGRAM(s) Oral every 6 hours PRN Mild Pain (1 - 3), Moderate Pain (4 - 6)  acetaminophen 300 mG/codeine 30 mG 1 Tablet(s) Oral every 4 hours PRN mod-sever pain  dextrose 40% Gel 15 Gram(s) Oral once PRN Blood Glucose LESS THAN 70 milliGRAM(s)/deciliter  glucagon  Injectable 1 milliGRAM(s) IntraMuscular once PRN Glucose LESS THAN 70 milligrams/deciliter      FAMILY HISTORY:      SOCIAL HISTORY:    CIGARETTES:    ALCOHOL:    REVIEW OF SYSTEMS:    CONSTITUTIONAL: No fever, weight loss, chills, shakes, or fatigue  EYES: No eye pain, visual disturbances, or discharge  ENMT:  No difficulty hearing, tinnitus, vertigo; No sinus or throat pain  NECK: No pain or stiffness  BREASTS: No pain, masses, or nipple discharge  RESPIRATORY: No cough, wheezing, hemoptysis, or shortness of breath  CARDIOVASCULAR: No chest pain, dyspnea, palpitations, dizziness, syncope, paroxysmal nocturnal dyspnea, orthopnea, or arm or leg swelling  GASTROINTESTINAL: No abdominal  or epigastric pain, nausea, vomiting, hematemesis, diarrhea, constipation, melena or bright red blood.  GENITOURINARY: No dysuria, nocturia, hematuria, or urinary incontinence  NEUROLOGICAL: No headaches, memory loss, slurred speech, limb weakness, loss of strength, numbness, or tremors  SKIN: No itching, burning, rashes, or lesions   LYMPH NODES: No enlarged glands  ENDOCRINE: No heat or cold intolerance, or hair loss  MUSCULOSKELETAL: No joint pain or swelling, muscle, back, or extremity pain  PSYCHIATRIC: No depression, anxiety, or difficulty sleeping  HEME/LYMPH: No easy bruising or bleeding gums  ALLERY AND IMMUNOLOGIC: No hives or rash.      Vital Signs Last 24 Hrs  T(C): 36.9 (17 Sep 2019 15:54), Max: 37.3 (16 Sep 2019 22:26)  T(F): 98.4 (17 Sep 2019 15:54), Max: 99.2 (16 Sep 2019 22:26)  HR: 82 (17 Sep 2019 15:54) (82 - 95)  BP: 130/54 (17 Sep 2019 15:54) (130/54 - 155/69)  BP(mean): --  RR: 18 (17 Sep 2019 15:54) (16 - 18)  SpO2: 100% (17 Sep 2019 15:54) (99% - 100%)    PHYSICAL EXAM:    GENERAL: In no apparent distress, well nourished, and hydrated.  HEAD:  Atraumatic, Normocephalic  EYES: EOMI, PERRLA, conjunctiva and sclera clear  ENMT: No tonsillar erythema, exudates, or enlargement; Moist mucous membranes, Good dentition, No lesions  NECK: Supple and normal thyroid.  No JVD or carotid bruit.  Carotid pulse is 2+ bilaterally.  HEART: Regular rate and rhythm; No murmurs, rubs, or gallops.  PULMONARY: Clear to auscultation and perfusion.  No rales, wheezing, or rhonchi bilaterally.  ABDOMEN: Soft, Nontender, Nondistended; Bowel sounds present  EXTREMITIES:  2+ Peripheral Pulses, No clubbing, cyanosis, or edema  LYMPH: No lymphadenopathy noted  NEUROLOGICAL: Grossly nonfocal          INTERPRETATION OF TELEMETRY:    ECG:    I&O's Detail    16 Sep 2019 07:01  -  17 Sep 2019 07:00  --------------------------------------------------------  IN:  Total IN: 0 mL    OUT:    Voided: 200 mL  Total OUT: 200 mL    Total NET: -200 mL          LABS:                        10.3   6.28  )-----------( 85       ( 17 Sep 2019 06:45 )             30.7         139  |  102  |  23  ----------------------------<  210<H>  4.6   |  25  |  1.36<H>    Ca    9.7      17 Sep 2019 06:45  Phos  2.7       Mg     1.8         TPro  6.7  /  Alb  4.0  /  TBili  1.0  /  DBili  x   /  AST  18  /  ALT  12  /  AlkPhos  75          PT/INR - ( 16 Sep 2019 04:10 )   PT: 15.1 SEC;   INR: 1.35          PTT - ( 16 Sep 2019 04:10 )  PTT:27.2 SEC    BNP  I&O's Detail    16 Sep 2019 07:01  -  17 Sep 2019 07:00  --------------------------------------------------------  IN:  Total IN: 0 mL    OUT:    Voided: 200 mL  Total OUT: 200 mL    Total NET: -200 mL        Daily Height in cm: 180.34 (17 Sep 2019 01:32)    Daily Weight in k.3 (17 Sep 2019 07:10)    RADIOLOGY & ADDITIONAL STUDIES:  PREVIOUS DIAGNOSTIC TESTING:      ECHO  FINDINGS:    STRESS  FINDINGS:    CATHETERIZATION  FINDINGS:      ASSESSMENT:        RECOMMENDATIONS: 79 y M with PMH CAD s/p CABG and AVR (),  atrial fibrillation, refusing anticoagulation, IDDM2, 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 Patient denies any LOC or head trauma during this fall. Patient has been feeling dizzy since completion of the radiation therapy which he describes as the room spinning with associated unsteady gait and loss of balance and has fallen at least 8-10 times in the last 3-4 months.  He also is lightheaded  when he gets up quickly from a chair or bed. No syncopal episodes.  He denies CP, SOB, or palpitations. He has loss of appetite since the radiation therapy and admits to poor po intake of both food and water. HE was scheduled for an bx and resection for SCC of the neck with Dr. Urbina on Thursday but the surgery has been cancelled due to his right shoulder fracture. Telemetry demonstrates rate controlled atrial fibrillation.   EKG:  Atrial fibrillation 73 bpm with IVCD (QRS 128ms). No tachy-phill episodes or pauses.     PAST MEDICAL & SURGICAL HISTORY:  Malignant neoplasm of tongue  Afib  Pleural Effusion  Viral Meningitis  Diabetes Mellitus  Gallstones  History of bronchoscopy: right VAT;   History of back surgery: 2016  H/O peripheral artery bypass: RLE;   H/O aortic valve replacement:   History of toe surgery: right 4th toe;   Cataract: bilateral IOL  S/P Laparoscopic Cholecystectomy: 10/2009  Aortocoronary Artery Bypass Graft (ICD9 V45.81)  History of Aortic Valve Replacement (ICD9 V43.3)  S/P Tonsillectomy  History of Appendectomy    MEDICATIONS  (STANDING):  aspirin enteric coated 325 milliGRAM(s) Oral daily  clopidogrel Tablet 75 milliGRAM(s) Oral daily  dextrose 5%. 1000 milliLiter(s) (50 mL/Hr) IV Continuous <Continuous>  dextrose 50% Injectable 12.5 Gram(s) IV Push once  dextrose 50% Injectable 25 Gram(s) IV Push once  dextrose 50% Injectable 25 Gram(s) IV Push once  influenza   Vaccine 0.5 milliLiter(s) IntraMuscular once  insulin glargine Injectable (LANTUS) 15 Unit(s) SubCutaneous at bedtime  insulin lispro (HumaLOG) corrective regimen sliding scale   SubCutaneous three times a day before meals  metoprolol succinate ER 25 milliGRAM(s) Oral daily    MEDICATIONS  (PRN):  acetaminophen   Tablet .. 975 milliGRAM(s) Oral every 6 hours PRN Mild Pain (1 - 3), Moderate Pain (4 - 6)  acetaminophen 300 mG/codeine 30 mG 1 Tablet(s) Oral every 4 hours PRN mod-sever pain  dextrose 40% Gel 15 Gram(s) Oral once PRN Blood Glucose LESS THAN 70 milliGRAM(s)/deciliter  glucagon  Injectable 1 milliGRAM(s) IntraMuscular once PRN Glucose LESS THAN 70 milligrams/deciliter      FAMILY HISTORY:      SOCIAL HISTORY: Lives with his wife    CIGARETTES: no  DRUGS:  no  ALCOHOL: no    REVIEW OF SYSTEMS:    CONSTITUTIONAL: No fever,  chills, shakes, or fatigue   + 40lb weight loss  EYES: No eye pain, visual disturbances, or discharge  ENMT:  No difficulty hearing, tinnitus, vertigo; No sinus or throat pain  NECK: right neck scarring from RT  BREASTS: No pain, masses, or nipple discharge  RESPIRATORY: No cough, wheezing, hemoptysis, or shortness of breath  CARDIOVASCULAR: No chest pain, dyspnea, palpitations, syncope, paroxysmal nocturnal dyspnea, orthopnea, or arm or leg swelling  + dizziness/lightheadedness  GASTROINTESTINAL: No abdominal  or epigastric pain, nausea, vomiting, hematemesis, diarrhea, constipation, melena or bright red blood.  GENITOURINARY: No dysuria, nocturia, hematuria, or urinary incontinence  NEUROLOGICAL: No headaches, memory loss, slurred speech, limb weakness, loss of strength, numbness, + tremor  SKIN: No itching, burning, rashes, or lesions   LYMPH NODES: right neck adenopathy  ENDOCRINE: No heat or cold intolerance, or hair loss  MUSCULOSKELETAL: No joint pain or swelling, muscle, back, or extremity pain  PSYCHIATRIC: No depression, anxiety, or difficulty sleeping  HEME/LYMPH: No easy bruising or bleeding gums  ALLERGY AND IMMUNOLOGIC: No hives or rash.      Vital Signs Last 24 Hrs  T(C): 36.9 (17 Sep 2019 15:54), Max: 37.3 (16 Sep 2019 22:26)  T(F): 98.4 (17 Sep 2019 15:54), Max: 99.2 (16 Sep 2019 22:26)  HR: 82 (17 Sep 2019 15:54) (82 - 95)  BP: 130/54 (17 Sep 2019 15:54) (130/54 - 155/69)  BP(mean): --  RR: 18 (17 Sep 2019 15:54) (16 - 18)  SpO2: 100% (17 Sep 2019 15:54) (99% - 100%)    PHYSICAL EXAM:    GENERAL: In no apparent distress, well nourished, and hydrated. Confused with visual hallucinations  HEAD:  Atraumatic, Normocephalic  EYES: EOMI, PERRLA, conjunctiva and sclera clear  ENMT: No tonsillar erythema, exudates, or enlargement; Dry mucous membranes  NECK: Supple and normal thyroid.  No JVD or carotid bruit.  Carotid pulse is 2+ bilaterally.  HEART: Irregular rate and rhythm; No murmurs, rubs, or gallops.  PULMONARY: Clear to auscultation and perfusion.  No rales, wheezing, or rhonchi bilaterally.  ABDOMEN: Soft, Nontender, Nondistended; Bowel sounds present  EXTREMITIES:  2+ Peripheral Pulses, No clubbing, cyanosis, or edema  LYMPH: No lymphadenopathy noted  NEUROLOGICAL: Grossly nonfocal          INTERPRETATION OF TELEMETRY: atrial fibrillation rate controlled     ECG: Atrial fibrillation 73 bpm with IVCD 128ms                      LABS:                        10.3   6.28  )-----------( 85       ( 17 Sep 2019 06:45 )             30.7         139  |  102  |  23  ----------------------------<  210<H>  4.6   |  25  |  1.36<H>    Ca    9.7      17 Sep 2019 06:45  Phos  2.7       Mg     1.8         TPro  6.7  /  Alb  4.0  /  TBili  1.0  /  DBili  x   /  AST  18  /  ALT  12  /  AlkPhos  75          PT/INR - ( 16 Sep 2019 04:10 )   PT: 15.1 SEC;   INR: 1.35          PTT - ( 16 Sep 2019 04:10 )  PTT:27.2 SEC          Daily Height in cm: 180.34 (17 Sep 2019 01:32)    Daily Weight in k.3 (17 Sep 2019 07:10)    RADIOLOGY & ADDITIONAL STUDIES:  PREVIOUS DIAGNOSTIC TESTING:      ECHO  FINDINGS:  Patient name: TEMI SWIFT  YOB: 1940   Age: 79 (M)   MR#: [id number]  Study Date: 2019  Location: 506[address] StressSonographer: LUPE Harkins  Study quality: Technically Difficult  Referring Physician: Rudy Cowart MD  Blood Pressure: 154/71 mmHg  Height: 180 cm  Weight: 79 kg  BSA: 2 m2  ------------------------------------------------------------------------  PROCEDURE: Transthoracic echocardiogram with 2-D, M-Mode  and complete spectral and color flow Doppler.  Verbal consent was obtained for injection of echo contrast.  Following  intravenous injection of contrast, harmonic  imaging was performed. [id number]  INDICATION: Syncope and collapse (R55)  ------------------------------------------------------------------------  OBSERVATIONS:  Mitral Valve: Mitral annular calcification, otherwise  normal mitral valve. Mild mitral regurgitation.  Aortic Root: Normal aortic root.  Aortic Valve: Bioprosthetic aortic valve replacement, which  was poorly visualized.  Left Atrium: Normal left atrium.  Left Ventricle: Endocardium not well visualized; grossly  normal left ventricular systolic function.  Paradoxical  septal wall motion, consistent with prior cardiac surgery.  Endocardial visualization enhanced with intravenous  injection of echo contrast (Definity).  Right Heart: Normal right atrium. Unable to accurately  evaluate right ventricular size or systolic function.  Tricuspid valve not well visualized. Pulmonic valve not  well visualized.  Pericardium/PleuraNormal pericardium with no pericardial  effusion.  ------------------------------------------------------------------------  CONCLUSIONS:  Technically very difficult study.  1. Mitral annular calcification, otherwise normal mitral  valve. Mild mitral regurgitation.  2. Bioprosthetic aortic valve replacement, which was poorly  visualized.  3. Endocardium not well visualized; grossly normal left  ventricular systolic function.  Paradoxical septal wall  motion, consistent with prior cardiac surgery.  Endocardial  visualization enhanced with intravenous injection of echo  contrast (Definity).  4. Unable to accurately evaluate right ventricular size or  systolic function.  ------------------------------------------------------------------------  Confirmed on  2019 - 11:16:32 by Yan Santa M.D.  ------------------------------------------------------------------------    STRESS  FINDINGS:  PATIENT: TEMI SWIFT  : 1940   AGE: 77 (M)   MR#: [id number]  STUDY DATE: 03/10/2017  LOCATION: [address]. PHYSICIAN(S): Isaias Landeros MD  FELLOW: LITZY Casarez PA  ------------------------------------------------------------------------  TYPE OF TEST: Stress/Rest SESTAMIBI  INDICATION: Abnormal electrocardiogram (ECG) (EKG)  (R94.31)  ------------------------------------------------------------------------  HISTORY:  CARDIAC HISTORY: 77 year old male sent for cardiac  evaluation prior to vascular surgery, past medical history  coronary artery disease, CABG, s/p AVR, atrial  fibrillation, hypertension, diabetes mellitus  RISK FACTORS: Diabetes, Hypertension  MEDICATIONS: asa, unasyn, levemir, humalog, percocet  ------------------------------------------------------------------------  BASELINE ELECTROCARDIOGRAM:  Rhythm: Atrial Fibrillation - 72 BPM  Conduction Defect: IVCD  ST: No significant ST abnormalities.  ------------------------------------------------------------------------  EXERCISE RESULTS:  TIME      SPEED    GRADE  HR      BP  Baseline  Standing   N/A  69  148/71  00:00     1.7 MPH    10%  77  00:30     2.5 MPH    12%  73  01:00     3.4 MPH    14%  85  156/49  02:00     4.2 MPH    16%  83  139/53  03:00     5   MPH    18%  82  156/65  04:00     5.5 MPH    20%  81  05:00     6   MPH    20%  81  157/60  06:00     Recovery        80  148/62  07:04     Recovery        78  150/59  ------------------------------------------------------------------------  Protocol: Santi   Exercise Duration: 4: 00 Min  HR: Baseline HR: 69 bpm   Peak HR: 85 bpm (60% of MPHR)  MPHR: 142 bpm   85% of MPHR: 121 bpm  BP: Baseline BP: 148/71 mmHg   Peak BP: 156/49 mmHg   Peak  RPP: [id number] (Rate Pressure Product)  Last Caffeine intake: 12 hrs  Terminated: Completion of protocol  SYMPTOMS/FINDINGS:  Symptoms: Dyspnea  Chest pain: No chest pain with administration of  Regadenoson  ------------------------------------------------------------------------  ECG ABNORMALITIES DURING/AFTER STRESS:   Abnormalities: None  ------------------------------------------------------------------------  NEW ARRHYTHMIAS DEVELOPED DURING/AFTER STRESS:  None  ------------------------------------------------------------------------  STRESS TEST IMPRESSIONS:   60% of MPHR.  Chest Pain: No chest pain with administration of  Regadenoson.  Symptom: Dyspnea.  HR Response: Appropriate.  BP Response: Appropriate.  Heart Rhythm: Atrial Fibrillation - 72 BPM.  Conduction defects: IVCD.  Baseline ECG: No significant ST abnormalities.  ECG Abnormalities: None.  Arrhythmia: None.  ------------------------------------------------------------------------  PROCEDURE:  24.1 mCi of Tc99m Sestamibi were injected during stress  protocol. Approximately 45 minutes later, tomographic  images were obtained in a 180 degree arc from right  anterior oblique to left anterior oblique with 64 stops.  At a separate time on , 23.7 mCi of Tc99m  Sestamibi were injected at rest. Approximately 45  minute(s) later, tomographic images were obtained in a 180  -degree arc from right anterior oblique to left anterior  oblique with 64 stops. The tomographic slices were  reconstructed in 3 orthogonal planes (short axis,  horizontal long axis and vertical long axis).  Interpretation was performed both by visual and  quantitative analysis.  Rest and stress images were acquired using CZT-based  system with pinhole collimation ([employer], [employer]), and reconstructed using MLEM algorithm.  Images were re-acquired with the patient in a prone  position.  ------------------------------------------------------------------------  NUCLEAR FINDINGS:  Review of raw data shows: The study is of good technical  quality.  There are small, mild defects in the apex and anteroseptal  wall that are partially reversible suggestive of infarct  with mild jinny-infarct ischemia. The are small, mild  defects in the inferior and inferoseptal walls that are  fixed, suggestive of infarct.  ------------------------------------------------------------------------  GATED ANALYSIS:  Post-stress gated wall motion analysis was performed (LVEF  = 55 %;LVEDV = 115 ml.), revealing normal overall LV  function. Paradoxical septal motion is seen consistent  with post-CABG state.  ------------------------------------------------------------------------  IMPRESSIONS:Abnormal Study  * Myocardial Perfusion SPECT results are abnormal.  * There are small, mild defects in the apex and  anteroseptal wall that are partially reversible suggestive  of infarct with mild jinny-infarct ischemia. The are small,  mild defects in the inferior and inferoseptal walls that  are fixed, suggestive of infarct.  * Post-stress gated wall motion analysis was performed  (LVEF = 55 %;LVEDV = 115 ml.), revealing normal overall LV  function. Paradoxical septal motion is seen consistent  ---with post-CABG state.  ------------------------------------------------------------------------  Confirmed on  3/10/2017 - 16:27:58 by Barney May MD

## 2019-09-17 NOTE — PROGRESS NOTE ADULT - PROBLEM SELECTOR PLAN 6
DVT: SCD  Diet: consistent carbs  Dispo: pending dizziness eval and if surgery will proceed DVT: SCD due to thrombocytopenia  Diet: consistent carbs  Dispo: pending dizziness eval and if surgery will proceed

## 2019-09-17 NOTE — CONSULT NOTE ADULT - SUBJECTIVE AND OBJECTIVE BOX
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 Patient denies any LOC or head trauma during this fall. Patient has been feeling dizzy and sustaining mild falls or loss of balance for 3-4 months (8-9 falls) but has not yet been evaluated for it. Patient denies specific triggers although notices that last night the dizziness occurred when he turned his head quickly. The dizziness he experiences is a sense of the room spinning although he does have moments of light headedness when he gets up quickly from a chair and endorses some visual floaters. Patient also feels a loss of balance when he is shaving. Patient does experience occasional episodes of hypoglycemia (2 in the last month) where his blood sugars can go to 50s and will experience lethargy and tremors however usually his blood sugars run in the 90-150s. Patient does not regularly check his blood pressures but have noticed that they have been <120/80 during his recent visits. Patient denies CP, SOB, or palpitations. Patient does endorse poor po intake of water, and loss of appetite due to loss of taste after radiation, however weight has stabilized after initial drop of 40 lbs and has some constipation at baseline.  Patient is scheduled for an bx and resection for SCC of the neck with Dr. Urbina for Thursday and has been completing multiple preop evaluations. Patient denies any fevers, chills, abdominal pain, diarrhea, urinary retention, dysuria, cough, headache.    ED course: found to have sustained a Acute comminuted impacted fracture at the surgical neck of the right   humerus. No glenohumeral dislocation      Allergies  No Known Allergies      MEDICATIONS:  aspirin enteric coated 325 milliGRAM(s) Oral daily  clopidogrel Tablet 75 milliGRAM(s) Oral daily  metoprolol succinate ER 25 milliGRAM(s) Oral daily  acetaminophen   Tablet .. 975 milliGRAM(s) Oral every 6 hours PRN  oxyCODONE    IR 5 milliGRAM(s) Oral every 4 hours PRN  dextrose 40% Gel 15 Gram(s) Oral once PRN  dextrose 50% Injectable 12.5 Gram(s) IV Push once  dextrose 50% Injectable 25 Gram(s) IV Push once  dextrose 50% Injectable 25 Gram(s) IV Push once  glucagon  Injectable 1 milliGRAM(s) IntraMuscular once PRN  insulin glargine Injectable (LANTUS) 20 Unit(s) SubCutaneous at bedtime  insulin lispro (HumaLOG) corrective regimen sliding scale   SubCutaneous three times a day before meals  dextrose 5%. 1000 milliLiter(s) IV Continuous <Continuous>  influenza   Vaccine 0.5 milliLiter(s) IntraMuscular once      PAST MEDICAL & SURGICAL HISTORY:  Malignant neoplasm of tongue  Afib  Pleural Effusion  Viral Meningitis  Diabetes Mellitus  Gallstones  History of bronchoscopy: right VAT; 2010  History of back surgery: 2016  H/O peripheral artery bypass: RLE; 2016  H/O aortic valve replacement: 2009  History of toe surgery: right 4th toe; 2016  Cataract: bilateral IOL  S/P Laparoscopic Cholecystectomy: 10/2009  Aortocoronary Artery Bypass Graft (ICD9 V45.81)  History of Aortic Valve Replacement (ICD9 V43.3)  S/P Tonsillectomy  History of Appendectomy      FAMILY HISTORY:  [place]    SOCIAL HISTORY:    non smoker. independent in adl    REVIEW OF SYSTEMS:  See HPI, otherwise complete 10 point review of systems negative    [ ] All others negative	    PHYSICAL EXAM:  T(C): 37.1 (09-17-19 @ 04:33), Max: 37.3 (09-16-19 @ 22:26)  HR: 95 (09-17-19 @ 04:33) (77 - 95)  BP: 154/71 (09-17-19 @ 04:33) (139/78 - 155/69)  RR: 18 (09-17-19 @ 04:33) (16 - 18)  SpO2: 100% (09-17-19 @ 04:33) (98% - 100%)  Wt(kg): --  I&O's Summary      Appearance: No Acute Distress	  HEENT:  Normal oral mucosa, PERRL, EOMI	  Cardiovascular: Normal S1 S2, No JVD, No murmurs/rubs/gallops  Respiratory: Lungs clear to auscultation bilaterally  Gastrointestinal:  Soft, Non-tender, + BS	  Skin: No rashes, No ecchymoses, No cyanosis	  Neurologic: Non-focal  Extremities: No clubbing, cyanosis or edema  Vascular: Peripheral pulses palpable 2+ bilaterally  Psychiatry: A & O x 3, Mood & affect appropriate    Laboratory Data:	 	    CBC Full  -  ( 16 Sep 2019 04:10 )  WBC Count : 6.67 K/uL  Hemoglobin : 10.8 g/dL  Hematocrit : 33.9 %  Platelet Count - Automated : 93 K/uL  Mean Cell Volume : 91.6 fL  Mean Cell Hemoglobin : 29.2 pg  Mean Cell Hemoglobin Concentration : 31.9 %  Auto Neutrophil # : x  Auto Lymphocyte # : x  Auto Monocyte # : x  Auto Eosinophil # : x  Auto Basophil # : x  Auto Neutrophil % : x  Auto Lymphocyte % : x  Auto Monocyte % : x  Auto Eosinophil % : x  Auto Basophil % : x    09-16    144  |  105  |  22  ----------------------------<  170<H>  4.7   |  28  |  1.42<H>    Ca    10.0      16 Sep 2019 04:10    TPro  6.9  /  Alb  4.2  /  TBili  0.7  /  DBili  x   /  AST  16  /  ALT  10  /  AlkPhos  70  09-16          Interpretation of Telemetry:  af rate controlled	    ECG:  	unable to locate recent ecg      Assessment:  -syncope  -persistent af with LBBB - not on therapeutic AC per patient wishes  -CAD s/p CABG/AVR  -Presyncope 2/2 to vertebrobasilar insufficiency vs orthostatic hypotension v TIA  -IDDM2  -SCC of the right neck dxed in 12/2018 s/p 35 rounds of RT and 7 rounds of chemo   -right humerus fx    Recs:  -would obtain carotid duplex to eval vertebrals/subclavian steal  -check orthostatics  -f/u 2d echo  -ep consulted given syncope and lbbb  -ac on hold despite elevated [id number] per patient wishes and concern for bleeding  -f/u ortho          Greater than 60 minutes spent on total encounter; more than 50% of the visit was spent counseling and/or coordinating care by the attending physician.   	  Junior Pena MD   Cardiovascular Diseases  [phone]

## 2019-09-17 NOTE — PROGRESS NOTE ADULT - PROBLEM SELECTOR PLAN 2
likely BPPV due to positional spinning that occurs with specific motions that is exacerbated with poor PO intake  ddx: includes orthostatic vs autonomous dysfunction vs carotid hypersensitivity although unable to r/o cardiac source due to hx of afib and valvular dysfxn  and unable to r/o neurologic source due to high ischemia possible due to neck radiation  CT BRAIN/NECK:  No acute intracranial mass effect, bleed, shift. Atheromatous irregularity and mild stenoses of the origins of the ICAs bilaterally measuring less than 50%. Atheromatous calcification along the aortic arch and origin of the great   vessels in bilateral vertebral arteries, likely contributing to focal vertebral stenoses.  will get EKG,   tele to monitor events  will get orthostatic vitals  c/s neuro for further pre op optimization and further evaluation for months worth of worsening dizziness and falls  PT consult likely BPPV due to positional spinning that occurs with specific motions that is exacerbated with poor PO intake   ddx: includes orthostatic vs autonomous dysfunction vs carotid hypersensitivity although unable to r/o cardiac source due to hx of afib and valvular dysfxn(given long term hx of bovine AVR)and unable to r/o neurologic source due to high ischemia possible due to neck radiation  CT BRAIN/NECK:  No acute intracranial mass effect, bleed, shift. Atheromatous irregularity and mild stenoses of the origins of the ICAs bilaterally measuring less than 50%. Atheromatous calcification along the aortic arch and origin of the great   vessels in bilateral vertebral arteries, likely contributing to focal vertebral stenoses.  will get EKG,   tele to monitor events: afib, no acute episodes of RVR or PVCs  orthostatic negative  neuro for further pre op optimization and further evaluation for months worth of worsening dizziness and falls  apprec neuro recs: MRI with and without to evaluate vertebrobasilar insufficiency, aspirin and plavix,   Cr is stable within baseline and bovine AVR so no contraindication for MRI  f/u ot recs

## 2019-09-17 NOTE — CONSULT NOTE ADULT - ASSESSMENT
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 Patient denies any LOC or head trauma during this fall. Patient has been feeling dizzy since completion of the radiation therapy which he describes as the room spinning with associated unsteady gait and loss of balance and has fallen at least 8-10 times in the last 3-4 months.  He also is lightheaded  when he gets up quickly from a chair or bed. No syncopal episodes.  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  Patient with recurrent falls x 3-4 months but denies syncope history.  EKG and telemetry show rate controlled atrial fibrillation. Etiology of falls unclear but symptoms sound most like vertigo.  Agree with brain MRI to rule out lesion or stroke. Patient may benefit from an ILR for prolonged monitoring prior to discharge.  Currently no clear pacing indication.

## 2019-09-17 NOTE — PROGRESS NOTE ADULT - PROBLEM SELECTOR PLAN 1
ortho recs appreciated : right proximal humerus fracture  Pain control  NWB R UE in sling  encourage active finger motion  no acute orthopedic intervention  Active movement of fingers/wrist/elbow encouraged  Follow up with Dr. Person within 2 weeks, call office for appointment [phone]  no contraindication to ENT surgery if patient is to be in standard supine position, do not manipulate right arm ortho recs appreciated : right proximal humerus fracture  Pain control with tylenol with codeine  NWB R UE in sling  encourage active finger motion  no acute orthopedic intervention  Active movement of fingers/wrist/elbow encouraged  Follow up with Dr. Person within 2 weeks, call office for appointment [phone]  no contraindication to ENT surgery if patient is to be in standard supine position, do not manipulate right arm  f/u pt ot recs

## 2019-09-18 LAB
ANION GAP SERPL CALC-SCNC: 14 MMO/L — SIGNIFICANT CHANGE UP (ref 7–14)
BUN SERPL-MCNC: 26 MG/DL — HIGH (ref 7–23)
CALCIUM SERPL-MCNC: 9.8 MG/DL — SIGNIFICANT CHANGE UP (ref 8.4–10.5)
CHLORIDE SERPL-SCNC: 101 MMOL/L — SIGNIFICANT CHANGE UP (ref 98–107)
CO2 SERPL-SCNC: 27 MMOL/L — SIGNIFICANT CHANGE UP (ref 22–31)
CREAT SERPL-MCNC: 1.33 MG/DL — HIGH (ref 0.5–1.3)
GLUCOSE BLDC GLUCOMTR-MCNC: 123 MG/DL — HIGH (ref 70–99)
GLUCOSE BLDC GLUCOMTR-MCNC: 155 MG/DL — HIGH (ref 70–99)
GLUCOSE BLDC GLUCOMTR-MCNC: 178 MG/DL — HIGH (ref 70–99)
GLUCOSE SERPL-MCNC: 168 MG/DL — HIGH (ref 70–99)
HCT VFR BLD CALC: 32.4 % — LOW (ref 39–50)
HGB BLD-MCNC: 10.5 G/DL — LOW (ref 13–17)
MAGNESIUM SERPL-MCNC: 1.8 MG/DL — SIGNIFICANT CHANGE UP (ref 1.6–2.6)
MCHC RBC-ENTMCNC: 30.2 PG — SIGNIFICANT CHANGE UP (ref 27–34)
MCHC RBC-ENTMCNC: 32.4 % — SIGNIFICANT CHANGE UP (ref 32–36)
MCV RBC AUTO: 93.1 FL — SIGNIFICANT CHANGE UP (ref 80–100)
NRBC # FLD: 0 K/UL — SIGNIFICANT CHANGE UP (ref 0–0)
PHOSPHATE SERPL-MCNC: 3.2 MG/DL — SIGNIFICANT CHANGE UP (ref 2.5–4.5)
PLATELET # BLD AUTO: 71 K/UL — LOW (ref 150–400)
PMV BLD: 10.7 FL — SIGNIFICANT CHANGE UP (ref 7–13)
POTASSIUM SERPL-MCNC: 4.2 MMOL/L — SIGNIFICANT CHANGE UP (ref 3.5–5.3)
POTASSIUM SERPL-SCNC: 4.2 MMOL/L — SIGNIFICANT CHANGE UP (ref 3.5–5.3)
RBC # BLD: 3.48 M/UL — LOW (ref 4.2–5.8)
RBC # FLD: 14.1 % — SIGNIFICANT CHANGE UP (ref 10.3–14.5)
SODIUM SERPL-SCNC: 142 MMOL/L — SIGNIFICANT CHANGE UP (ref 135–145)
WBC # BLD: 5.32 K/UL — SIGNIFICANT CHANGE UP (ref 3.8–10.5)
WBC # FLD AUTO: 5.32 K/UL — SIGNIFICANT CHANGE UP (ref 3.8–10.5)

## 2019-09-18 PROCEDURE — 93016 CV STRESS TEST SUPVJ ONLY: CPT | Mod: GC

## 2019-09-18 PROCEDURE — 93010 ELECTROCARDIOGRAM REPORT: CPT

## 2019-09-18 PROCEDURE — 78452 HT MUSCLE IMAGE SPECT MULT: CPT | Mod: 26

## 2019-09-18 PROCEDURE — 93018 CV STRESS TEST I&R ONLY: CPT | Mod: GC

## 2019-09-18 PROCEDURE — 99233 SBSQ HOSP IP/OBS HIGH 50: CPT | Mod: GC

## 2019-09-18 PROCEDURE — 99232 SBSQ HOSP IP/OBS MODERATE 35: CPT

## 2019-09-18 RX ADMIN — Medication 325 MILLIGRAM(S): at 18:09

## 2019-09-18 RX ADMIN — CLOPIDOGREL BISULFATE 75 MILLIGRAM(S): 75 TABLET, FILM COATED ORAL at 18:09

## 2019-09-18 RX ADMIN — INSULIN GLARGINE 15 UNIT(S): 100 INJECTION, SOLUTION SUBCUTANEOUS at 21:54

## 2019-09-18 RX ADMIN — Medication 2: at 18:09

## 2019-09-18 RX ADMIN — Medication 25 MILLIGRAM(S): at 05:26

## 2019-09-18 NOTE — OCCUPATIONAL THERAPY INITIAL EVALUATION ADULT - PERTINENT HX OF CURRENT PROBLEM, REHAB EVAL
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 In ED, pt found to have right humerus fx.

## 2019-09-18 NOTE — PROGRESS NOTE ADULT - ASSESSMENT
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  Patient with recurrent falls x 3-4 months but denies syncope history.  EKG and telemetry show rate controlled atrial fibrillation. Stress test with fixed defects in inferior, inferoseptal and inferolateral walls with severe hypokinesis, EF 51%.  Etiology of falls unclear but symptoms sound most like vertigo.  Agree with brain MRI to rule out lesion or stroke. Would also do orthostatics as patient had symptoms today when getting out of bed.  There were no corresponding events on telemetry.  Patient may benefit from an ILR for prolonged monitoring prior to discharge.  Currently no clear pacing indication.   Plan:   Brain MRI           Orthostatics           Abdominal sonogram to evaluate AAA.            Possible ILR prior to discharge.

## 2019-09-18 NOTE — PROGRESS NOTE ADULT - PROBLEM SELECTOR PLAN 4
c/w with home toprolol 25 mg daily  patient was put on Eliquis then coumadin in the past however now will only take aspirin 325mg  CHADS Vasc: 4.8% stroke risk  explained to patient risks of not being on anticoagulation  spoke to cardiologist and updated him, pt had not vocalized complaints of dizziness while being cleared. ECHO was performed but unable to send over report  TTE: poor study may require ASHTYN to better visualize valve and chambers  apprec Card recs: ordered stress test and c/s EP, carotid duplex for possible steal  f/u final EP recs

## 2019-09-18 NOTE — PHYSICAL THERAPY INITIAL EVALUATION ADULT - ADDITIONAL COMMENTS
Patient lives with his wife in an apartment, with 18 steps to enter. Patient reports he was previously independent in all ADLs and ambulated with a cane prior to admission.    Patient was left semi-supine in bed as found, all lines/tubes intact and call hoyos within reach, LATHA vazquez

## 2019-09-18 NOTE — PHYSICAL THERAPY INITIAL EVALUATION ADULT - LEVEL OF INDEPENDENCE: GAIT, REHAB EVAL
pt with c/o dizziness upon standing. Pt transferred back to supine position. DM=977/91 mmHg/unable to perform

## 2019-09-18 NOTE — PROGRESS NOTE ADULT - ATTENDING COMMENTS
more awake and alert today - orthostatics remain neg  TTE wo sig valvular / wall motion abnormality  awaiting [facility] / Duplex carotids today   per cards plan for NST and possible ILR - thus far rate controlled Afib on tele.   no further hypoglycemia on dec Lantus  check repeat EKG  PT eval for dispo - OR delayed for now for ongoing work up ]  DW wife at bedside

## 2019-09-18 NOTE — PROGRESS NOTE ADULT - SUBJECTIVE AND OBJECTIVE BOX
Boston Tolentino MD, PGY1  [phone]        Patient is a 79y old  Male who presents with a chief complaint of dizziness (17 Sep 2019 17:45)        SUBJECTIVE / OVERNIGHT EVENTS: Patient had no acute events overnight. Patient seen and examined at bedside this morning.     ROS: [ - ] Fever [ - ] Chills [ - ] Nausea/Vomiting [ - ] Chest Pain [ - ] Shortness of breath     MEDICATIONS  (STANDING):  aspirin enteric coated 325 milliGRAM(s) Oral daily  clopidogrel Tablet 75 milliGRAM(s) Oral daily  dextrose 5%. 1000 milliLiter(s) (50 mL/Hr) IV Continuous <Continuous>  dextrose 50% Injectable 12.5 Gram(s) IV Push once  dextrose 50% Injectable 25 Gram(s) IV Push once  dextrose 50% Injectable 25 Gram(s) IV Push once  influenza   Vaccine 0.5 milliLiter(s) IntraMuscular once  insulin glargine Injectable (LANTUS) 15 Unit(s) SubCutaneous at bedtime  insulin lispro (HumaLOG) corrective regimen sliding scale   SubCutaneous three times a day before meals  metoprolol succinate ER 25 milliGRAM(s) Oral daily    MEDICATIONS  (PRN):  acetaminophen   Tablet .. 975 milliGRAM(s) Oral every 6 hours PRN Mild Pain (1 - 3), Moderate Pain (4 - 6)  acetaminophen 300 mG/codeine 30 mG 1 Tablet(s) Oral every 4 hours PRN mod-sever pain  dextrose 40% Gel 15 Gram(s) Oral once PRN Blood Glucose LESS THAN 70 milliGRAM(s)/deciliter  glucagon  Injectable 1 milliGRAM(s) IntraMuscular once PRN Glucose LESS THAN 70 milligrams/deciliter      Vital Signs Last 24 Hrs  T(C): 36.7 (18 Sep 2019 05:25), Max: 36.9 (17 Sep 2019 15:54)  T(F): 98.1 (18 Sep 2019 05:25), Max: 98.4 (17 Sep 2019 15:54)  HR: 70 (18 Sep 2019 05:25) (70 - 82)  BP: 123/76 (18 Sep 2019 05:25) (123/76 - 150/70)  BP(mean): --  RR: 18 (18 Sep 2019 05:25) (18 - 18)  SpO2: 100% (18 Sep 2019 05:25) (100% - 100%)  CAPILLARY BLOOD GLUCOSE      POCT Blood Glucose.: 170 mg/dL (17 Sep 2019 22:15)  POCT Blood Glucose.: 127 mg/dL (17 Sep 2019 18:08)  POCT Blood Glucose.: 135 mg/dL (17 Sep 2019 13:11)  POCT Blood Glucose.: 189 mg/dL (17 Sep 2019 09:03)    I&O's Summary    17 Sep 2019 07:01  -  18 Sep 2019 07:00  --------------------------------------------------------  IN: 240 mL / OUT: 300 mL / NET: -60 mL        PHYSICAL EXAM  GENERAL: NAD, lying comfortably in bed   HEAD:  Atraumatic, Normocephalic  EYES: EOMI, PERRLA, conjunctiva and sclera clear  NECK: Supple, No JVD  CHEST/LUNG: Clear to auscultation bilaterally; No wheeze  HEART: Regular rate and rhythm; No murmurs, rubs, or gallops  ABDOMEN: Soft, Nontender, Nondistended; Bowel sounds present  EXTREMITIES:  2+ Peripheral Pulses, No clubbing, cyanosis, or edema  NEURO: AAOx3, non-focal  SKIN: No rashes or lesions      LABS:                        10.3   6.28  )-----------( 85       ( 17 Sep 2019 06:45 )             30.7     09-17    139  |  102  |  23  ----------------------------<  210<H>  4.6   |  25  |  1.36<H>    Ca    9.7      17 Sep 2019 06:45  Phos  2.7     09-17  Mg     1.8     09-17    TPro  6.7  /  Alb  4.0  /  TBili  1.0  /  DBili  x   /  AST  18  /  ALT  12  /  AlkPhos  75  09-17                RADIOLOGY & ADDITIONAL TESTS:    Imaging Personally Reviewed:  Consultant(s) Notes Reviewed: Boston Tolentino MD, PGY1  [phone]        Patient is a 79y old  Male who presents with a chief complaint of dizziness (17 Sep 2019 17:45)    SUBJECTIVE / OVERNIGHT EVENTS: Patient had no acute events overnight. Patient seen and examined at bedside this morning. Patient refused orthostatic vitals as he does not want to get stood up multiple times during the night. Patient denies any dizziness sxs as he has been lying in bed all day and is scheduled to get an MRI today. 1.56 second pause on tele    ROS: [ - ] Fever [ - ] Chills [ - ] Nausea/Vomiting [ - ] Chest Pain [ - ] Shortness of breath [-] dysuria, abdominal, constipation, diarrhea, leg swelling    MEDICATIONS  (STANDING):  aspirin enteric coated 325 milliGRAM(s) Oral daily  clopidogrel Tablet 75 milliGRAM(s) Oral daily  dextrose 5%. 1000 milliLiter(s) (50 mL/Hr) IV Continuous <Continuous>  dextrose 50% Injectable 12.5 Gram(s) IV Push once  dextrose 50% Injectable 25 Gram(s) IV Push once  dextrose 50% Injectable 25 Gram(s) IV Push once  influenza   Vaccine 0.5 milliLiter(s) IntraMuscular once  insulin glargine Injectable (LANTUS) 15 Unit(s) SubCutaneous at bedtime  insulin lispro (HumaLOG) corrective regimen sliding scale   SubCutaneous three times a day before meals  metoprolol succinate ER 25 milliGRAM(s) Oral daily    MEDICATIONS  (PRN):  acetaminophen   Tablet .. 975 milliGRAM(s) Oral every 6 hours PRN Mild Pain (1 - 3), Moderate Pain (4 - 6)  acetaminophen 300 mG/codeine 30 mG 1 Tablet(s) Oral every 4 hours PRN mod-sever pain  dextrose 40% Gel 15 Gram(s) Oral once PRN Blood Glucose LESS THAN 70 milliGRAM(s)/deciliter  glucagon  Injectable 1 milliGRAM(s) IntraMuscular once PRN Glucose LESS THAN 70 milligrams/deciliter      Vital Signs Last 24 Hrs  T(C): 36.7 (18 Sep 2019 05:25), Max: 36.9 (17 Sep 2019 15:54)  T(F): 98.1 (18 Sep 2019 05:25), Max: 98.4 (17 Sep 2019 15:54)  HR: 70 (18 Sep 2019 05:25) (70 - 82)  BP: 123/76 (18 Sep 2019 05:25) (123/76 - 150/70)  BP(mean): --  RR: 18 (18 Sep 2019 05:25) (18 - 18)  SpO2: 100% (18 Sep 2019 05:25) (100% - 100%)  CAPILLARY BLOOD GLUCOSE      POCT Blood Glucose.: 170 mg/dL (17 Sep 2019 22:15)  POCT Blood Glucose.: 127 mg/dL (17 Sep 2019 18:08)  POCT Blood Glucose.: 135 mg/dL (17 Sep 2019 13:11)  POCT Blood Glucose.: 189 mg/dL (17 Sep 2019 09:03)    I&O's Summary    17 Sep 2019 07:01  -  18 Sep 2019 07:00  --------------------------------------------------------  IN: 240 mL / OUT: 300 mL / NET: -60 mL        PHYSICAL EXAM  GENERAL: NAD, lying comfortably in bed   HEAD:  Atraumatic, Normocephalic  EYES: EOMI, PERRLA, conjunctiva and sclera clear  NECK: Supple, No JVD  CHEST/LUNG: Clear to auscultation bilaterally; No wheeze  HEART: irregular rate and rhythm; No murmurs, rubs, or gallops  ABDOMEN: Soft, Nontender, Nondistended; Bowel sounds present  EXTREMITIES:  2+ Peripheral Pulses, No clubbing, cyanosis, or edema. TTP of shoulder   NEURO: AAOx3, non-focal  SKIN: No rashes. echymosis along right inside shoulder, skin on left hand is also oozing blood       LABS:                        10.3   6.28  )-----------( 85       ( 17 Sep 2019 06:45 )             30.7     09-17    139  |  102  |  23  ----------------------------<  210<H>  4.6   |  25  |  1.36<H>    Ca    9.7      17 Sep 2019 06:45  Phos  2.7     09-17  Mg     1.8     09-17    TPro  6.7  /  Alb  4.0  /  TBili  1.0  /  DBili  x   /  AST  18  /  ALT  12  /  AlkPhos  75  09-17                RADIOLOGY & ADDITIONAL TESTS:    Imaging Personally Reviewed:  Consultant(s) Notes Reviewed:

## 2019-09-18 NOTE — PROGRESS NOTE ADULT - SUBJECTIVE AND OBJECTIVE BOX
Cardiovascular Disease Progress Note    Overnight events: No acute events overnight.  no cp/sob/palps/dizziness  Otherwise review of systems negative    Objective Findings:  T(C): 36.7 (09-18-19 @ 05:25), Max: 36.9 (09-17-19 @ 15:54)  HR: 70 (09-18-19 @ 05:25) (70 - 82)  BP: 123/76 (09-18-19 @ 05:25) (123/76 - 150/70)  RR: 18 (09-18-19 @ 05:25) (18 - 18)  SpO2: 100% (09-18-19 @ 05:25) (100% - 100%)  Wt(kg): --  Daily     Daily       Physical Exam:  Gen: NAD  HEENT: EOMI  CV: RRR, normal S1 + S2, no m/r/g  Lungs: CTAB  Abd: soft, non-tender  Ext: No edema    Telemetry: af rate controlled    Laboratory Data:                        10.5   5.32  )-----------( 71       ( 18 Sep 2019 06:00 )             32.4     09-17    139  |  102  |  23  ----------------------------<  210<H>  4.6   |  25  |  1.36<H>    Ca    9.7      17 Sep 2019 06:45  Phos  2.7     09-17  Mg     1.8     09-17    TPro  6.7  /  Alb  4.0  /  TBili  1.0  /  DBili  x   /  AST  18  /  ALT  12  /  AlkPhos  75  09-17              Inpatient Medications:  MEDICATIONS  (STANDING):  aspirin enteric coated 325 milliGRAM(s) Oral daily  clopidogrel Tablet 75 milliGRAM(s) Oral daily  dextrose 5%. 1000 milliLiter(s) (50 mL/Hr) IV Continuous <Continuous>  dextrose 50% Injectable 12.5 Gram(s) IV Push once  dextrose 50% Injectable 25 Gram(s) IV Push once  dextrose 50% Injectable 25 Gram(s) IV Push once  influenza   Vaccine 0.5 milliLiter(s) IntraMuscular once  insulin glargine Injectable (LANTUS) 15 Unit(s) SubCutaneous at bedtime  insulin lispro (HumaLOG) corrective regimen sliding scale   SubCutaneous three times a day before meals  metoprolol succinate ER 25 milliGRAM(s) Oral daily      Assessment:  -syncope  -persistent af with LBBB - not on therapeutic AC per patient wishes  -CAD s/p CABG/AVR  -Presyncope 2/2 to vertebrobasilar insufficiency vs orthostatic hypotension v TIA  -IDDM2  -SCC of the right neck dxed in 12/2018 s/p 35 rounds of RT and 7 rounds of chemo   -right humerus fx    Recs:  -would obtain carotid duplex to eval vertebrals/subclavian steal  -check orthostatics  -2d echo without acute findings  -ep consulted given syncope and lbbb appreciated. syncope currently appears vertiginous in origin  -ac on hold despite elevated [id number] per patient wishes and concern for bleeding  -f/u ortho        Over 25 minutes spent on total encounter; more than 50% of the visit was spent counseling and/or coordinating care by the attending physician.      Junior Pena MD   Cardiovascular Disease  [phone]

## 2019-09-18 NOTE — PHYSICAL THERAPY INITIAL EVALUATION ADULT - RANGE OF MOTION EXAMINATION, REHAB EVAL
Right UE not tested secondary to fracture/sling except Right hand ROM WFL/Left UE ROM was WFL (within functional limits)/bilateral lower extremity ROM was WFL (within functional limits)

## 2019-09-18 NOTE — OCCUPATIONAL THERAPY INITIAL EVALUATION ADULT - GENERAL OBSERVATIONS, REHAB EVAL
Pt received in semisupine position. + right UE fx. Pt with c/o dizziness upon standing. Pt transferred back to supine position. XS=276/91 mmHg

## 2019-09-18 NOTE — PHYSICAL THERAPY INITIAL EVALUATION ADULT - PERTINENT HX OF CURRENT PROBLEM, REHAB EVAL
Patient is an 79 year old male admitted to [facility] on 9/16 with right shoulder pain after sustaining a fall secondary to dizziness. 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 x-ray of right shoulder: +Fractured proximal right humerus. No acute Ortho intervention per Ortho Consult.

## 2019-09-18 NOTE — PHYSICAL THERAPY INITIAL EVALUATION ADULT - GENERAL OBSERVATIONS, REHAB EVAL
Patient was received semi-supine in bed in [facility], family at bedside. PT/OT donned sling to right UE.

## 2019-09-18 NOTE — PROGRESS NOTE ADULT - PROBLEM SELECTOR PLAN 2
likely BPPV due to positional spinning that occurs with specific motions that is exacerbated with poor PO intake   ddx: includes orthostatic vs autonomous dysfunction vs carotid hypersensitivity although unable to r/o cardiac source due to hx of afib and valvular dysfxn(given long term hx of bovine AVR)and unable to r/o neurologic source due to high ischemia possible due to neck radiation  CT BRAIN/NECK:  No acute intracranial mass effect, bleed, shift. Atheromatous irregularity and mild stenoses of the origins of the ICAs bilaterally measuring less than 50%. Atheromatous calcification along the aortic arch and origin of the great   vessels in bilateral vertebral arteries, likely contributing to focal vertebral stenoses.  EKG: Atrial fibrillation 73 bpm with IVCD  tele to monitor events: rate controlled afib, no acute episodes of RVR or PVCs  orthostatic negative  neuro for further pre op optimization and further evaluation for months worth of worsening dizziness and falls  apprec neuro recs: MRI with and without to evaluate vertebrobasilar insufficiency, aspirin and plavix,   Cr is stable within baseline and bovine AVR so no contraindication for MRI  f/u ot recs

## 2019-09-18 NOTE — PHYSICAL THERAPY INITIAL EVALUATION ADULT - PATIENT PROFILE REVIEW, REHAB EVAL
PT orders received: bedrest order discontinue as of 09/18/19 13:44. Consult with RN Frantz CHEN, pt may participate in PT evaluation./yes yes/PT orders received: bedrest order discontinued as of 09/18/19 13:44. Consult with RN Frantz CHEN, pt may participate in PT evaluation.

## 2019-09-18 NOTE — PROGRESS NOTE ADULT - PROBLEM SELECTOR PLAN 1
ortho recs appreciated : right proximal humerus fracture  Pain control with tylenol with codeine  NWB R UE in sling  encourage active finger motion  no acute orthopedic intervention  Active movement of fingers/wrist/elbow encouraged  Follow up with Dr. Person within 2 weeks, call office for appointment [phone]  no contraindication to ENT surgery if patient is to be in standard supine position, do not manipulate right arm  f/u pt ot recs

## 2019-09-18 NOTE — OCCUPATIONAL THERAPY INITIAL EVALUATION ADULT - RANGE OF MOTION EXAMINATION, UPPER EXTREMITY
Left UE Active ROM was WFL (within functional limits)/Right UE--> shoulder not tested, elbow active assistive ROM WFL, hand/wrist active ROM WFL.

## 2019-09-18 NOTE — OCCUPATIONAL THERAPY INITIAL EVALUATION ADULT - WEIGHT-BEARING RESTRICTIONS: SIT/STAND, REHAB EVAL
Right UE NWB. Pt with c/o dizziness upon standing. Pt transferred back to supine position. CX=011/91 mmHg/nonweight-bearing

## 2019-09-18 NOTE — OCCUPATIONAL THERAPY INITIAL EVALUATION ADULT - PLANNED THERAPY INTERVENTIONS, OT EVAL
transfer training/ADL retraining/bed mobility training/fine motor coordination training/ROM/strengthening/balance training

## 2019-09-18 NOTE — PROGRESS NOTE ADULT - SUBJECTIVE AND OBJECTIVE BOX
Patient denies chest pain, shortness of breath, palpitations or lightheadedness.  Says he became dizzy when he stood at the side of the bed.   No events overnight.    Vital Signs Last 24 Hrs  T(C): 36.4 (18 Sep 2019 15:19), Max: 36.8 (17 Sep 2019 22:03)  T(F): 97.6 (18 Sep 2019 15:19), Max: 98.2 (17 Sep 2019 22:03)  HR: 80 (18 Sep 2019 15:19) (70 - 80)  BP: 117/85 (18 Sep 2019 15:19) (117/85 - 150/70)  BP(mean): --  RR: 17 (18 Sep 2019 15:19) (17 - 18)  SpO2: 98% (18 Sep 2019 15:) (98% - 100%)      EKG  Telemetry:  atrial fibrillation rate controlled.  MEDICATIONS  (STANDING):  aspirin enteric coated 325 milliGRAM(s) Oral daily  clopidogrel Tablet 75 milliGRAM(s) Oral daily  dextrose 5%. 1000 milliLiter(s) (50 mL/Hr) IV Continuous <Continuous>  dextrose 50% Injectable 12.5 Gram(s) IV Push once  dextrose 50% Injectable 25 Gram(s) IV Push once  dextrose 50% Injectable 25 Gram(s) IV Push once  influenza   Vaccine 0.5 milliLiter(s) IntraMuscular once  insulin glargine Injectable (LANTUS) 15 Unit(s) SubCutaneous at bedtime  insulin lispro (HumaLOG) corrective regimen sliding scale   SubCutaneous three times a day before meals  metoprolol succinate ER 25 milliGRAM(s) Oral daily    MEDICATIONS  (PRN):  acetaminophen   Tablet .. 975 milliGRAM(s) Oral every 6 hours PRN Mild Pain (1 - 3), Moderate Pain (4 - 6)  acetaminophen 300 mG/codeine 30 mG 1 Tablet(s) Oral every 4 hours PRN mod-sever pain  dextrose 40% Gel 15 Gram(s) Oral once PRN Blood Glucose LESS THAN 70 milliGRAM(s)/deciliter  glucagon  Injectable 1 milliGRAM(s) IntraMuscular once PRN Glucose LESS THAN 70 milligrams/deciliter          Physical exam:   Gen- patient in NAD  Resp- clear breath sounds in anterior lungs.  Could not turn patient due to right shoulder pain.  CV- S1 and S2 irregular irregular. No murmur, gallops or rubs  ABD- soft nontender + bowel sounds  EXT- no edema or calf tenderness  Neuro- grossly nonfocal                            10.5   5.32  )-----------( 71       ( 18 Sep 2019 06:00 )             32.4           142  |  101  |  26<H>  ----------------------------<  168<H>  4.2   |  27  |  1.33<H>    Ca    9.8      18 Sep 2019 15:50  Phos  3.2       Mg     1.8         TPro  6.7  /  Alb  4.0  /  TBili  1.0  /  DBili  x   /  AST  18  /  ALT  12  /  AlkPhos  75          STRESS TEST;  PATIENT: TEMI SWIFT  : 1940   AGE: 79 (M)   MR#: [id number]  STUDY DATE: 2019  LOCATION: [place] [address]  REF. PHYSICIAN(S): Tyler Ash MD  FELLOW: LITZY Ponce PA  ------------------------------------------------------------------------  TYPE OF TEST: Stress/Rest  Pharmacologic  INDICATION: Abnormal electrocardiogram (ECG) (EKG)  (R94.31)  ------------------------------------------------------------------------  HISTORY:  CARDIAC HISTORY: 79 year old male arrived with dizziness  now s/p fall found with humerus fracture, past medical  history coronary artery disease, CABG, s/p AVR, atrial  fibrillation, diabetes mellitus  OTHER HISTORY: Abdominal Aortic Aneursym  RISK FACTORS: Diabetes, Hypertension  MEDICATIONS: asa, plavix, lantus, toprol, percocet  ------------------------------------------------------------------------  BASELINE ELECTROCARDIOGRAM:  Rhythm: Atrial Fibrillation - 77 BPM  Conduction Defect: LBBB  ------------------------------------------------------------------------  HEMODYNAMIC PARAMETERS:                                      HR      BP  Baseline  Pre-Injection             79  138/63  00:00     Inject Regadenoson        75  00:30     Post Injection            75  01:00     Post Injection           72  117/43  02:00     Post Injection            81  100/47  03:00     Post Injection            79  104/49  04:00     Post Injection            74  121/56  05:00     Recovery                  81  128/58  06:00     [id number]  120/59  07:00     Recovery                  83  ------------------------------------------------------------------------  Agent: Regadenoson 0.4 mg/5 ml NS. injected over 10 sec.  Aminophylline: 75 mg  HR: Baseline HR: 79 bpm   Peak HR: 83 bpm (59% of MPHR)  MPHR: 140 bpm   85% of MPHR: 119 bpm  BP: Baseline BP: 138/63 mmHg   Peak BP: 138/63 mmHg   Peak  RPP: 12046 (Rate Pressure Product)  Last Caffeine intake: 12 hrs  Terminated: Completion of protocol  SYMPTOMS/FINDINGS:  Symptoms: No Symptom  Chest pain: No chest pain with administration of  Regadenoson  ------------------------------------------------------------------------  ECG ABNORMALITIES DURING/AFTER STRESS:   Abnormalities: ECG changes could not be interpreted due  to bundle branch block.  ------------------------------------------------------------------------  NEW ARRHYTHMIAS DEVELOPED DURING/AFTER STRESS:  None  ------------------------------------------------------------------------  STRESS TEST IMPRESSIONS:  Chest Pain: No chest pain with administration of  Regadenoson.  Symptom: No Symptom.  HR Response: Appropriate.  BP Response: Appropriate.  Heart Rhythm: Atrial Fibrillation - 77 BPM.  Conduction defects: LBBB.  ECG Abnormalities: ECG changes could not be interpreted  due to bundle branch block.  Arrhythmia: None.  ------------------------------------------------------------------------  PROCEDURE:  7.2 mCi of Tc99m Sestamibi were injected during stress  protocol. Approximately 45 minutes later, tomographic  images were obtained in a 180 degree arc from right  anterior oblique to left anterior oblique with 64 stops.  At a separate time on 2019, 22.9 mCi of Tc99m  Sestamibi were injected at rest. Approximately 45  minute(s) later, tomographic images were obtained in a 180  degree arc from right anterior oblique to left anterior  oblique with 64 stops. The tomographic slices were  reconstructed in 3 orthogonal planes (short axis,  horizontal long axis and vertical long axis).  Interpretation was performed both by visual and  quantitative analysis.  Rest and stress images were acquired using CZT-based  system with pinhole collimation ([employer] 530 c, easyfolio), and reconstructed using MLEM algorithm.  Images were re-acquired with the patient in a prone  position.  ------------------------------------------------------------------------  NUCLEAR FINDINGS:  Review of raw data shows: The study is of good adequate  quality with some gut tracer upatake.  The left ventricle was normal in size. There are large,  moderate defects in inferior, inferoseptal, and  inferolateral walls that are fixed, suggestive of  infarct.There is a small, mild defect in anteroseptal and  apical wall that is predominantly fixed, suggestive of  infarction with minimal jinny-infarct ischemia.  ------------------------------------------------------------------------  GATED ANALYSIS:  Post-stress gated wall motion analysis was performed (LVEF  = 51 %;LVEDV = 83 ml.), revealing severe hypokinesis in  inferior, inferoseptal, and inferolateral wall(s).  ------------------------------------------------------------------------  IMPRESSIONS:Abnormal Study  * Myocardial Perfusion SPECT results are abnormal.  * Review of raw data shows: The study is of good adequate  quality with some gut tracer uptake  * The left ventricle was normal in size. There are large,  moderate defects in inferior, inferoseptal, and  inferolateral walls that are fixed, suggestive of  infarct. There is a small, mild defect in anteroseptal and  apical wall that is predominantly fixed, suggestive of  infarction with minimal jinny-infarct ischemia.  * Post-stress gated wall motion analysis was performed  (LVEF = 51 %;LVEDV = 83 ml.), revealing severe hypokinesis  in inferior, inferoseptal, and inferolateral wall(s).  ------------------------------------------------------------------------

## 2019-09-18 NOTE — PROGRESS NOTE ADULT - PROBLEM SELECTOR PLAN 5
A1c 7.0  at home Levemir 26 units  high suspicion for 2 hypoglycemic events prior to admission, 57 fingerstick on 16, 17th blood sugars controlled  will decrease to 15 units Lantus

## 2019-09-19 ENCOUNTER — APPOINTMENT (OUTPATIENT)
Dept: SURGERY | Facility: HOSPITAL | Age: 79
End: 2019-09-19

## 2019-09-19 LAB
ANION GAP SERPL CALC-SCNC: 14 MMO/L — SIGNIFICANT CHANGE UP (ref 7–14)
BUN SERPL-MCNC: 26 MG/DL — HIGH (ref 7–23)
CALCIUM SERPL-MCNC: 9.6 MG/DL — SIGNIFICANT CHANGE UP (ref 8.4–10.5)
CHLORIDE SERPL-SCNC: 103 MMOL/L — SIGNIFICANT CHANGE UP (ref 98–107)
CO2 SERPL-SCNC: 27 MMOL/L — SIGNIFICANT CHANGE UP (ref 22–31)
CREAT SERPL-MCNC: 1.42 MG/DL — HIGH (ref 0.5–1.3)
GLUCOSE BLDC GLUCOMTR-MCNC: 103 MG/DL — HIGH (ref 70–99)
GLUCOSE BLDC GLUCOMTR-MCNC: 105 MG/DL — HIGH (ref 70–99)
GLUCOSE BLDC GLUCOMTR-MCNC: 119 MG/DL — HIGH (ref 70–99)
GLUCOSE BLDC GLUCOMTR-MCNC: 124 MG/DL — HIGH (ref 70–99)
GLUCOSE SERPL-MCNC: 85 MG/DL — SIGNIFICANT CHANGE UP (ref 70–99)
HCT VFR BLD CALC: 28.7 % — LOW (ref 39–50)
HGB BLD-MCNC: 9.5 G/DL — LOW (ref 13–17)
MAGNESIUM SERPL-MCNC: 1.8 MG/DL — SIGNIFICANT CHANGE UP (ref 1.6–2.6)
MCHC RBC-ENTMCNC: 29.9 PG — SIGNIFICANT CHANGE UP (ref 27–34)
MCHC RBC-ENTMCNC: 33.1 % — SIGNIFICANT CHANGE UP (ref 32–36)
MCV RBC AUTO: 90.3 FL — SIGNIFICANT CHANGE UP (ref 80–100)
NRBC # FLD: 0.02 K/UL — SIGNIFICANT CHANGE UP (ref 0–0)
PHOSPHATE SERPL-MCNC: 3.2 MG/DL — SIGNIFICANT CHANGE UP (ref 2.5–4.5)
PLATELET # BLD AUTO: 96 K/UL — LOW (ref 150–400)
PMV BLD: 10.5 FL — SIGNIFICANT CHANGE UP (ref 7–13)
POTASSIUM SERPL-MCNC: 3.6 MMOL/L — SIGNIFICANT CHANGE UP (ref 3.5–5.3)
POTASSIUM SERPL-SCNC: 3.6 MMOL/L — SIGNIFICANT CHANGE UP (ref 3.5–5.3)
RBC # BLD: 3.18 M/UL — LOW (ref 4.2–5.8)
RBC # FLD: 13.8 % — SIGNIFICANT CHANGE UP (ref 10.3–14.5)
SODIUM SERPL-SCNC: 144 MMOL/L — SIGNIFICANT CHANGE UP (ref 135–145)
WBC # BLD: 4.93 K/UL — SIGNIFICANT CHANGE UP (ref 3.8–10.5)
WBC # FLD AUTO: 4.93 K/UL — SIGNIFICANT CHANGE UP (ref 3.8–10.5)

## 2019-09-19 PROCEDURE — 93880 EXTRACRANIAL BILAT STUDY: CPT | Mod: 26

## 2019-09-19 PROCEDURE — 99233 SBSQ HOSP IP/OBS HIGH 50: CPT | Mod: GC

## 2019-09-19 PROCEDURE — 93978 VASCULAR STUDY: CPT | Mod: 26

## 2019-09-19 RX ORDER — SODIUM CHLORIDE 9 MG/ML
1000 INJECTION INTRAMUSCULAR; INTRAVENOUS; SUBCUTANEOUS
Refills: 0 | Status: COMPLETED | OUTPATIENT
Start: 2019-09-19 | End: 2019-09-20

## 2019-09-19 RX ADMIN — SODIUM CHLORIDE 75 MILLILITER(S): 9 INJECTION INTRAMUSCULAR; INTRAVENOUS; SUBCUTANEOUS at 17:30

## 2019-09-19 RX ADMIN — Medication 1 TABLET(S): at 14:28

## 2019-09-19 RX ADMIN — CLOPIDOGREL BISULFATE 75 MILLIGRAM(S): 75 TABLET, FILM COATED ORAL at 09:37

## 2019-09-19 RX ADMIN — INSULIN GLARGINE 15 UNIT(S): 100 INJECTION, SOLUTION SUBCUTANEOUS at 21:12

## 2019-09-19 RX ADMIN — Medication 325 MILLIGRAM(S): at 09:37

## 2019-09-19 RX ADMIN — Medication 25 MILLIGRAM(S): at 05:33

## 2019-09-19 RX ADMIN — Medication 1 TABLET(S): at 15:15

## 2019-09-19 NOTE — PROGRESS NOTE ADULT - PROBLEM SELECTOR PLAN 6
DVT: SCD due to thrombocytopenia  Diet: consistent carbs  Dispo: pending dizziness eval and if surgery will proceed DVT: SCD due to thrombocytopenia  Diet: consistent carbs  Dispo: rehab pending [facility]H and possible ILR

## 2019-09-19 NOTE — PROGRESS NOTE ADULT - SUBJECTIVE AND OBJECTIVE BOX
Patient is a 79y old  Male who presents with a chief complaint of dizziness (19 Sep 2019 07:58)  c/o R shoulder pain.  Denies dizziness on bedrest.  States" dizzy when got up".     PAST MEDICAL & SURGICAL HISTORY:  Malignant neoplasm of tongue  Afib  Pleural Effusion  Viral Meningitis  Diabetes Mellitus  AAA (Abdominal Aortic Aneurysm): hx of  Gallstones  History of bronchoscopy: right VAT; 2010  History of back surgery: 2016  H/O peripheral artery bypass: RLE; 2016  H/O aortic valve replacement: 2009  History of toe surgery: right 4th toe; 2016  Cataract: bilateral IOL  Cataract  S/P Laparoscopic Cholecystectomy: 10/2009  Aortocoronary Artery Bypass Graft (ICD9 V45.81)  History of Aortic Valve Replacement (ICD9 V43.3)  S/P Tonsillectomy  History of Appendectomy      MEDICATIONS  (STANDING):  aspirin enteric coated 325 milliGRAM(s) Oral daily  clopidogrel Tablet 75 milliGRAM(s) Oral daily  dextrose 5%. 1000 milliLiter(s) (50 mL/Hr) IV Continuous <Continuous>  dextrose 50% Injectable 12.5 Gram(s) IV Push once  dextrose 50% Injectable 25 Gram(s) IV Push once  dextrose 50% Injectable 25 Gram(s) IV Push once  influenza   Vaccine 0.5 milliLiter(s) IntraMuscular once  insulin glargine Injectable (LANTUS) 15 Unit(s) SubCutaneous at bedtime  insulin lispro (HumaLOG) corrective regimen sliding scale   SubCutaneous three times a day before meals  metoprolol succinate ER 25 milliGRAM(s) Oral daily    MEDICATIONS  (PRN):  acetaminophen   Tablet .. 975 milliGRAM(s) Oral every 6 hours PRN Mild Pain (1 - 3), Moderate Pain (4 - 6)  acetaminophen 300 mG/codeine 30 mG 1 Tablet(s) Oral every 4 hours PRN mod-sever pain  dextrose 40% Gel 15 Gram(s) Oral once PRN Blood Glucose LESS THAN 70 milliGRAM(s)/deciliter  glucagon  Injectable 1 milliGRAM(s) IntraMuscular once PRN Glucose LESS THAN 70 milligrams/deciliter            Vital Signs Last 24 Hrs  T(C): 36.6 (19 Sep 2019 05:39), Max: 36.7 (18 Sep 2019 21:57)  T(F): 97.8 (19 Sep 2019 05:39), Max: 98 (18 Sep 2019 21:57)  HR: 80 (19 Sep 2019 05:39) (80 - 87)  BP: 135/78 (19 Sep 2019 05:39) (117/85 - 135/78)  BP(mean): --  RR: 15 (19 Sep 2019 05:39) (15 - 17)  SpO2: 98% (19 Sep 2019 05:39) (98% - 98%)            INTERPRETATION OF TELEMETRY:  AFib 's bpm; no phill or pauses seen on telemetry    ECG:        LABS:                        9.5    4.93  )-----------( 96       ( 19 Sep 2019 06:30 )             28.7     09-19    144  |  103  |  26<H>  ----------------------------<  85  3.6   |  27  |  1.42<H>    Ca    9.6      19 Sep 2019 06:30  Phos  3.2     09-19  Mg     1.8     09-19      BNP  RADIOLOGY & ADDITIONAL STUDIES:        PHYSICAL EXAM:    GENERAL: In no apparent distress, well nourished, and hydrated.  NECK: Supple and normal thyroid.  No JVD or carotid bruit.  Carotid pulse is 2+ bilaterally.  HEART: S1S2 irregularly irregular;  No murmurs, rubs, or gallops.  PULMONARY: Clear to auscultation and perfusion.  No rales, wheezing, or rhonchi bilaterally.  ABDOMEN: Soft, Nontender, Nondistended; Bowel sounds present  EXTREMITIES:  2+ Peripheral Pulses, No clubbing, cyanosis, or edema  R shoulder/R upper arm fracture  NEUROLOGICAL: Grossly nonfocal

## 2019-09-19 NOTE — PROGRESS NOTE ADULT - ATTENDING COMMENTS
MRH for today   will defer ILR to EP - though suspect if organic etiology for DZ, likely neurological and less likely cardiac   eventual dispo rehab   DW wife at bedside

## 2019-09-19 NOTE — PROGRESS NOTE ADULT - SUBJECTIVE AND OBJECTIVE BOX
Boston Tolentino MD, PGY1  [phone]        Patient is a 79y old  Male who presents with a chief complaint of dizziness (18 Sep 2019 18:24)        SUBJECTIVE / OVERNIGHT EVENTS: Patient had no acute events overnight. Patient seen and examined at bedside this morning.     ROS: [ - ] Fever [ - ] Chills [ - ] Nausea/Vomiting [ - ] Chest Pain [ - ] Shortness of breath     MEDICATIONS  (STANDING):  aspirin enteric coated 325 milliGRAM(s) Oral daily  clopidogrel Tablet 75 milliGRAM(s) Oral daily  dextrose 5%. 1000 milliLiter(s) (50 mL/Hr) IV Continuous <Continuous>  dextrose 50% Injectable 12.5 Gram(s) IV Push once  dextrose 50% Injectable 25 Gram(s) IV Push once  dextrose 50% Injectable 25 Gram(s) IV Push once  influenza   Vaccine 0.5 milliLiter(s) IntraMuscular once  insulin glargine Injectable (LANTUS) 15 Unit(s) SubCutaneous at bedtime  insulin lispro (HumaLOG) corrective regimen sliding scale   SubCutaneous three times a day before meals  metoprolol succinate ER 25 milliGRAM(s) Oral daily    MEDICATIONS  (PRN):  acetaminophen   Tablet .. 975 milliGRAM(s) Oral every 6 hours PRN Mild Pain (1 - 3), Moderate Pain (4 - 6)  acetaminophen 300 mG/codeine 30 mG 1 Tablet(s) Oral every 4 hours PRN mod-sever pain  dextrose 40% Gel 15 Gram(s) Oral once PRN Blood Glucose LESS THAN 70 milliGRAM(s)/deciliter  glucagon  Injectable 1 milliGRAM(s) IntraMuscular once PRN Glucose LESS THAN 70 milligrams/deciliter      Vital Signs Last 24 Hrs  T(C): 36.6 (19 Sep 2019 05:39), Max: 36.7 (18 Sep 2019 21:57)  T(F): 97.8 (19 Sep 2019 05:39), Max: 98 (18 Sep 2019 21:57)  HR: 80 (19 Sep 2019 05:39) (80 - 87)  BP: 135/78 (19 Sep 2019 05:39) (117/85 - 135/78)  BP(mean): --  RR: 15 (19 Sep 2019 05:39) (15 - 17)  SpO2: 98% (19 Sep 2019 05:39) (98% - 98%)  CAPILLARY BLOOD GLUCOSE      POCT Blood Glucose.: 155 mg/dL (18 Sep 2019 21:18)  POCT Blood Glucose.: 178 mg/dL (18 Sep 2019 17:38)  POCT Blood Glucose.: 123 mg/dL (18 Sep 2019 13:50)    I&O's Summary    18 Sep 2019 07:01  -  19 Sep 2019 07:00  --------------------------------------------------------  IN: 220 mL / OUT: 400 mL / NET: -180 mL        PHYSICAL EXAM  GENERAL: NAD, lying comfortably in bed   HEAD:  Atraumatic, Normocephalic  EYES: EOMI, PERRLA, conjunctiva and sclera clear  NECK: Supple, No JVD  CHEST/LUNG: Clear to auscultation bilaterally; No wheeze  HEART: Regular rate and rhythm; No murmurs, rubs, or gallops  ABDOMEN: Soft, Nontender, Nondistended; Bowel sounds present  EXTREMITIES:  2+ Peripheral Pulses, No clubbing, cyanosis, or edema  NEURO: AAOx3, non-focal  SKIN: No rashes or lesions      LABS:                        10.5   5.32  )-----------( 71       ( 18 Sep 2019 06:00 )             32.4     09-18    142  |  101  |  26<H>  ----------------------------<  168<H>  4.2   |  27  |  1.33<H>    Ca    9.8      18 Sep 2019 15:50  Phos  3.2     09-18  Mg     1.8     09-18                  RADIOLOGY & ADDITIONAL TESTS:    Imaging Personally Reviewed:  Consultant(s) Notes Reviewed: Boston Tolentino MD, PGY1  [phone]        Patient is a 79y old  Male who presents with a chief complaint of dizziness (18 Sep 2019 18:24)    SUBJECTIVE / OVERNIGHT EVENTS: Patient had no acute events overnight. Patient seen and examined at bedside this morning. No acute episodes of dizziness today although did experience mild dizziness with movement yesterday.     ROS: [ - ] Fever [ - ] Chills [ - ] Nausea/Vomiting [ - ] Chest Pain [ - ] Shortness of breath {-} dysuria, constipation, diarrhea, abdominal pain     MEDICATIONS  (STANDING):  aspirin enteric coated 325 milliGRAM(s) Oral daily  clopidogrel Tablet 75 milliGRAM(s) Oral daily  dextrose 5%. 1000 milliLiter(s) (50 mL/Hr) IV Continuous <Continuous>  dextrose 50% Injectable 12.5 Gram(s) IV Push once  dextrose 50% Injectable 25 Gram(s) IV Push once  dextrose 50% Injectable 25 Gram(s) IV Push once  influenza   Vaccine 0.5 milliLiter(s) IntraMuscular once  insulin glargine Injectable (LANTUS) 15 Unit(s) SubCutaneous at bedtime  insulin lispro (HumaLOG) corrective regimen sliding scale   SubCutaneous three times a day before meals  metoprolol succinate ER 25 milliGRAM(s) Oral daily    MEDICATIONS  (PRN):  acetaminophen   Tablet .. 975 milliGRAM(s) Oral every 6 hours PRN Mild Pain (1 - 3), Moderate Pain (4 - 6)  acetaminophen 300 mG/codeine 30 mG 1 Tablet(s) Oral every 4 hours PRN mod-sever pain  dextrose 40% Gel 15 Gram(s) Oral once PRN Blood Glucose LESS THAN 70 milliGRAM(s)/deciliter  glucagon  Injectable 1 milliGRAM(s) IntraMuscular once PRN Glucose LESS THAN 70 milligrams/deciliter      Vital Signs Last 24 Hrs  T(C): 36.6 (19 Sep 2019 05:39), Max: 36.7 (18 Sep 2019 21:57)  T(F): 97.8 (19 Sep 2019 05:39), Max: 98 (18 Sep 2019 21:57)  HR: 80 (19 Sep 2019 05:39) (80 - 87)  BP: 135/78 (19 Sep 2019 05:39) (117/85 - 135/78)  BP(mean): --  RR: 15 (19 Sep 2019 05:39) (15 - 17)  SpO2: 98% (19 Sep 2019 05:39) (98% - 98%)  CAPILLARY BLOOD GLUCOSE      POCT Blood Glucose.: 155 mg/dL (18 Sep 2019 21:18)  POCT Blood Glucose.: 178 mg/dL (18 Sep 2019 17:38)  POCT Blood Glucose.: 123 mg/dL (18 Sep 2019 13:50)    I&O's Summary    18 Sep 2019 07:01  -  19 Sep 2019 07:00  --------------------------------------------------------  IN: 220 mL / OUT: 400 mL / NET: -180 mL        PHYSICAL EXAM  GENERAL: NAD, lying comfortably in bed   HEAD:  Atraumatic, Normocephalic  EYES: EOMI, PERRLA, conjunctiva and sclera clear  NECK: Supple, No JVD  CHEST/LUNG: Clear to auscultation bilaterally; No wheeze  HEART: irregular rate and rhythm; No murmurs, rubs, or gallops  ABDOMEN: Soft, Nontender, Nondistended; Bowel sounds present  EXTREMITIES:  2+ Peripheral Pulses, No clubbing, cyanosis, or edema. left hand dorsal wound is wrapped up and no longer oozing blood   NEURO: Alert and orientated to person, place, and date, able to verbalize why he needs the MRI and demonstrates insight into condition  SKIN: No rashes or lesions, skin tags on chest      LABS:                        10.5   5.32  )-----------( 71       ( 18 Sep 2019 06:00 )             32.4     09-18    142  |  101  |  26<H>  ----------------------------<  168<H>  4.2   |  27  |  1.33<H>    Ca    9.8      18 Sep 2019 15:50  Phos  3.2     09-18  Mg     1.8     09-18                  RADIOLOGY & ADDITIONAL TESTS:    Imaging Personally Reviewed: stress test reviewed  Consultant(s) Notes Reviewed:  EP and cards

## 2019-09-19 NOTE — DISCHARGE NOTE PROVIDER - CARE PROVIDERS DIRECT ADDRESSES
,[email or web address]

## 2019-09-19 NOTE — PROGRESS NOTE ADULT - SUBJECTIVE AND OBJECTIVE BOX
Cardiovascular Disease Progress Note    Overnight events: No acute events overnight.  had episode of dizziness when sitting up yest. no cp/sob/palps. tele withou events  Otherwise review of systems negative    Objective Findings:  T(C): 36.6 (09-19-19 @ 05:39), Max: 36.7 (09-18-19 @ 21:57)  HR: 80 (09-19-19 @ 05:39) (80 - 87)  BP: 135/78 (09-19-19 @ 05:39) (117/85 - 135/78)  RR: 15 (09-19-19 @ 05:39) (15 - 17)  SpO2: 98% (09-19-19 @ 05:39) (98% - 98%)  Wt(kg): --  Daily     Daily       Physical Exam:  Gen: NAD  HEENT: EOMI  CV: RRR, normal S1 + S2, no m/r/g  Lungs: CTAB  Abd: soft, non-tender  Ext: No edema    Telemetry: af lbbb rate controlled    Laboratory Data:                        10.5   5.32  )-----------( 71       ( 18 Sep 2019 06:00 )             32.4     09-18    142  |  101  |  26<H>  ----------------------------<  168<H>  4.2   |  27  |  1.33<H>    Ca    9.8      18 Sep 2019 15:50  Phos  3.2     09-18  Mg     1.8     09-18                Inpatient Medications:  MEDICATIONS  (STANDING):  aspirin enteric coated 325 milliGRAM(s) Oral daily  clopidogrel Tablet 75 milliGRAM(s) Oral daily  dextrose 5%. 1000 milliLiter(s) (50 mL/Hr) IV Continuous <Continuous>  dextrose 50% Injectable 12.5 Gram(s) IV Push once  dextrose 50% Injectable 25 Gram(s) IV Push once  dextrose 50% Injectable 25 Gram(s) IV Push once  influenza   Vaccine 0.5 milliLiter(s) IntraMuscular once  insulin glargine Injectable (LANTUS) 15 Unit(s) SubCutaneous at bedtime  insulin lispro (HumaLOG) corrective regimen sliding scale   SubCutaneous three times a day before meals  metoprolol succinate ER 25 milliGRAM(s) Oral daily      Assessment:  -syncope  -persistent af with LBBB - not on therapeutic AC per patient wishes  -CAD s/p CABG/AVR  -Presyncope 2/2 to vertebrobasilar insufficiency vs orthostatic hypotension v TIA  -IDDM2  -SCC of the right neck dxed in 12/2018 s/p 35 rounds of RT and 7 rounds of chemo   -right humerus fx  -AAA    Recs:  -would obtain carotid duplex to eval vertebrals/subclavian steal  -check orthostatics  -2d echo without acute findings  -ep consulted given syncope and lbbb appreciated. syncope currently appears orthostatic/vertiginous in origin. no pacing indicatios noted. af rate controlled on toprol 25  -s/p nst fixed inf defect. normal EF. no e/o ischemia  -ac on hold despite elevated [id number] per patient wishes and concern for bleeding  -routine AAA surveillance  -f/u ortho      Over 25 minutes spent on total encounter; more than 50% of the visit was spent counseling and/or coordinating care by the attending physician.      Junior Pena MD   Cardiovascular Disease  [phone]

## 2019-09-19 NOTE — DISCHARGE NOTE PROVIDER - PROVIDER TOKENS
PROVIDER:[TOKEN:[[id number]],FOLLOWUP:[2 weeks]],PROVIDER:[TOKEN:[[id number]],FOLLOWUP:[1 week]],PROVIDER:[TOKEN:[[id number]],FOLLOWUP:[2 weeks]]

## 2019-09-19 NOTE — PROGRESS NOTE ADULT - PROBLEM SELECTOR PLAN 2
likely BPPV due to positional spinning that occurs with specific motions that is exacerbated with poor PO intake   ddx: includes orthostatic vs autonomous dysfunction vs carotid hypersensitivity although unable to r/o cardiac source due to hx of afib and valvular dysfxn(given long term hx of bovine AVR)and unable to r/o neurologic source due to high ischemia possible due to neck radiation  CT BRAIN/NECK:  No acute intracranial mass effect, bleed, shift. Atheromatous irregularity and mild stenoses of the origins of the ICAs bilaterally measuring less than 50%. Atheromatous calcification along the aortic arch and origin of the great   vessels in bilateral vertebral arteries, likely contributing to focal vertebral stenoses.  EKG: Atrial fibrillation 73 bpm with IVCD  tele to monitor events: rate controlled afib, no acute episodes of RVR or PVCs  orthostatic negative  neuro for further pre op optimization and further evaluation for months worth of worsening dizziness and falls  apprec neuro recs: MRI with and without to evaluate vertebrobasilar insufficiency, aspirin and plavix,   Cr is stable within baseline and bovine AVR so no contraindication for MRI

## 2019-09-19 NOTE — PROGRESS NOTE ADULT - ASSESSMENT
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  Patient with recurrent falls x 3-4 months but denies syncope history.  EKG and telemetry show rate controlled atrial fibrillation. Stress test with fixed defects in inferior, inferoseptal and inferolateral walls with severe hypokinesis, EF 51%.  Etiology of falls unclear but symptoms sound most like vertigo.  Agree with brain MRI to rule out lesion or stroke. Incomplete orthostatic BP (lying and sitting only) showed mildly dropped SBP, no standing BP recorded.  So far there were no corresponding events on telemetry.  Patient may benefit from an ILR for prolonged monitoring prior to discharge.  Currently no clear pacing indication.   Plan:   Brain MRI           Orthostatics to check standing BP response           Abdominal sonogram to evaluate AAA.   Will follow up for possible ILR prior to discharge.

## 2019-09-19 NOTE — PROGRESS NOTE ADULT - PROBLEM SELECTOR PLAN 5
A1c 7.0  at home Levemir 26 units  high suspicion for 2 hypoglycemic events prior to admission, 57 fingerstick on 16, 17th blood sugars controlled  decreased to 15 units Lantus, and required 2 units of SSI, will increase to 17 Lanuts A1c 7.0  at home Levemir 26 units  high suspicion for 2 hypoglycemic events prior to admission, 57 fingerstick on 16, 17-18th blood sugars controlled  decreased to 15 units Lantus, and required 2 units of SSI, will increase to 17 Lantus

## 2019-09-19 NOTE — DISCHARGE NOTE PROVIDER - HOSPITAL COURSE
79 y M with PMH CAD s/p CABG and AVR, rate controlled Afib not on AC, IDDM2, SCC of the tongue dxed in 12/2018 s/p 35 rounds of RT and 7 rounds of chemo, stable AAA, presents on 9/15 with right shoulder pain after sustaining a fall secondary to dizziness and found to have a closed proximal right radius fracture. Ortho was consulted and decided for no inpatient intervention to be done and should follow up as an outpatient. For dizziness, has been evaluated by neurology and cardiology, electrophysiology. CTH, CTA of neck, EKG, ECHO did not show definitive cause for dizziness. Still concerned for orthostatic, BPPV or vertebrobasilar insufficiency. Now scheduled for MRI with and without contrast, VA carotid and abdominal duplex study. Of note patient had a elective biopsy of right neck lymph node scheduled with Dr. Portillo at [facility] on 9/18 prior to admission but has since been canceled and the patient should reschedule. 79 y M with PMH CAD s/p CABG and AVR, rate controlled Afib not on AC, IDDM2, SCC of the tongue dxed in 12/2018 s/p 35 rounds of RT and 7 rounds of chemo, stable AAA, presents on 9/15 with right shoulder pain after sustaining a fall secondary to dizziness and found to have a closed proximal right radius fracture. Ortho was consulted and decided for no inpatient intervention to be done and should follow up as an outpatient. Due to episodes of hypoglycemia patient insulin regimen was decreased to Lantus 15 for the duration of his hospital stay. For dizziness, has been evaluated by neurology and cardiology, electrophysiology. CTH, CTA of neck, EKG, ECHO did not show definitive cause for dizziness. Still concerned for orthostatic, BPPV or vertebrobasilar insufficiency. Now scheduled for MRI with and without contrast, VA carotid and abdominal duplex study. Of note patient had a elective biopsy of right neck lymph node scheduled with Dr. Portillo at [facility] on 9/18 prior to admission but has since been canceled and the patient should reschedule.

## 2019-09-19 NOTE — DISCHARGE NOTE PROVIDER - CARE PROVIDER_API CALL
Jin Person)  Orthopedics  [address], [address]  [place] [address]  Phone: [phone]  Fax: [phone]  Follow Up Time: 2 weeks    Santo Reveles)  Medicine  [id number] [place] [address], [place] [address]  Phone: [phone]  Fax: [phone]  Follow Up Time: 1 week    Thierry Pena)  Cardiovascular Disease; Internal Medicine  [address] [place] [address]  Phone: [phone]  Fax: [phone]  Follow Up Time: 2 weeks

## 2019-09-19 NOTE — PROGRESS NOTE ADULT - PROBLEM SELECTOR PLAN 4
c/w with home toprolol 25 mg daily  patient was put on Eliquis then coumadin in the past however now will only take aspirin 325mg  CHADS Vasc: 4.8% stroke risk  explained to patient risks of not being on anticoagulation  spoke to cardiologist and updated him, pt had not vocalized complaints of dizziness while being cleared. ECHO was performed but unable to send over report  TTE: Bioprosthetic aortic valve replacemen poorly visualized no comment on AS grossly normal left ventricular systolic function.  Paradoxical septal wall  motion, consistent with prior cardiac surgery  apprec Card recs:  c/s EP, carotid duplex for possible steal, abd sonogram for AAA  f/u final EP recs  Stress test: The left ventricle was normal in size. There are large, moderate defects in inferior, inferoseptal, and inferolateral walls that are fixed, suggestive of infarct with small area of perinfarct and 51% LVEF c/w with home toprolol 25 mg daily  patient was put on Eliquis then coumadin in the past however now will only take aspirin 325mg  CHADS Vasc: 4.8% stroke risk  explained to patient risks of not being on anticoagulation  spoke to cardiologist and updated him, pt had not vocalized complaints of dizziness while being cleared. ECHO was performed but unable to send over report  TTE: Bioprosthetic aortic valve replacemen poorly visualized no comment on AS grossly normal left ventricular systolic function.  Paradoxical septal wall  motion, consistent with prior cardiac surgery  apprec Card recs:  c/s EP, carotid duplex for possible steal, abd sonogram for AAA  f/u final EP recs: possible ILR  Stress test: The left ventricle was normal in size. There are large, moderate defects in inferior, inferoseptal, and inferolateral walls that are fixed, suggestive of infarct with small area of perinfarct and 51% LVEF

## 2019-09-19 NOTE — DISCHARGE NOTE PROVIDER - NSDCFUSCHEDAPPT_GEN_ALL_CORE_FT
TEMI SWIFT ; 11/21/2019 ; P [facility] [address] [place] [address] TEMI SWIFT ; 11/21/2019 ; P [facility] [address] [place] [address]

## 2019-09-19 NOTE — PROGRESS NOTE ADULT - PROBLEM SELECTOR PLAN 1
ortho recs appreciated : right proximal humerus fracture  Pain control with tylenol with codeine  NWB R UE in sling  encourage active finger motion  no acute orthopedic intervention  Active movement of fingers/wrist/elbow encouraged  Follow up with Dr. Person within 2 weeks, call office for appointment [phone]  no contraindication to ENT surgery if patient is to be in standard supine position, do not manipulate right arm  pt/ot: rehab

## 2019-09-20 LAB
ALBUMIN SERPL ELPH-MCNC: 3.6 G/DL — SIGNIFICANT CHANGE UP (ref 3.3–5)
ALP SERPL-CCNC: 69 U/L — SIGNIFICANT CHANGE UP (ref 40–120)
ALT FLD-CCNC: 6 U/L — SIGNIFICANT CHANGE UP (ref 4–41)
ANION GAP SERPL CALC-SCNC: 13 MMO/L — SIGNIFICANT CHANGE UP (ref 7–14)
ANION GAP SERPL CALC-SCNC: 13 MMO/L — SIGNIFICANT CHANGE UP (ref 7–14)
AST SERPL-CCNC: 16 U/L — SIGNIFICANT CHANGE UP (ref 4–40)
BILIRUB SERPL-MCNC: 0.9 MG/DL — SIGNIFICANT CHANGE UP (ref 0.2–1.2)
BUN SERPL-MCNC: 28 MG/DL — HIGH (ref 7–23)
BUN SERPL-MCNC: 28 MG/DL — HIGH (ref 7–23)
CALCIUM SERPL-MCNC: 9.3 MG/DL — SIGNIFICANT CHANGE UP (ref 8.4–10.5)
CALCIUM SERPL-MCNC: 9.3 MG/DL — SIGNIFICANT CHANGE UP (ref 8.4–10.5)
CHLORIDE SERPL-SCNC: 105 MMOL/L — SIGNIFICANT CHANGE UP (ref 98–107)
CHLORIDE SERPL-SCNC: 105 MMOL/L — SIGNIFICANT CHANGE UP (ref 98–107)
CO2 SERPL-SCNC: 25 MMOL/L — SIGNIFICANT CHANGE UP (ref 22–31)
CO2 SERPL-SCNC: 25 MMOL/L — SIGNIFICANT CHANGE UP (ref 22–31)
CREAT SERPL-MCNC: 1.58 MG/DL — HIGH (ref 0.5–1.3)
CREAT SERPL-MCNC: 1.58 MG/DL — HIGH (ref 0.5–1.3)
GLUCOSE BLDC GLUCOMTR-MCNC: 102 MG/DL — HIGH (ref 70–99)
GLUCOSE BLDC GLUCOMTR-MCNC: 120 MG/DL — HIGH (ref 70–99)
GLUCOSE BLDC GLUCOMTR-MCNC: 125 MG/DL — HIGH (ref 70–99)
GLUCOSE BLDC GLUCOMTR-MCNC: 141 MG/DL — HIGH (ref 70–99)
GLUCOSE BLDC GLUCOMTR-MCNC: 142 MG/DL — HIGH (ref 70–99)
GLUCOSE BLDC GLUCOMTR-MCNC: 148 MG/DL — HIGH (ref 70–99)
GLUCOSE BLDC GLUCOMTR-MCNC: 50 MG/DL — LOW (ref 70–99)
GLUCOSE BLDC GLUCOMTR-MCNC: 59 MG/DL — LOW (ref 70–99)
GLUCOSE BLDC GLUCOMTR-MCNC: 65 MG/DL — LOW (ref 70–99)
GLUCOSE SERPL-MCNC: 56 MG/DL — LOW (ref 70–99)
GLUCOSE SERPL-MCNC: 56 MG/DL — LOW (ref 70–99)
HCT VFR BLD CALC: 29.1 % — LOW (ref 39–50)
HGB BLD-MCNC: 9.4 G/DL — LOW (ref 13–17)
MAGNESIUM SERPL-MCNC: 1.9 MG/DL — SIGNIFICANT CHANGE UP (ref 1.6–2.6)
MCHC RBC-ENTMCNC: 29.8 PG — SIGNIFICANT CHANGE UP (ref 27–34)
MCHC RBC-ENTMCNC: 32.3 % — SIGNIFICANT CHANGE UP (ref 32–36)
MCV RBC AUTO: 92.4 FL — SIGNIFICANT CHANGE UP (ref 80–100)
NRBC # FLD: 0 K/UL — SIGNIFICANT CHANGE UP (ref 0–0)
PHOSPHATE SERPL-MCNC: 3.5 MG/DL — SIGNIFICANT CHANGE UP (ref 2.5–4.5)
PLATELET # BLD AUTO: 99 K/UL — LOW (ref 150–400)
PMV BLD: 10.7 FL — SIGNIFICANT CHANGE UP (ref 7–13)
POTASSIUM SERPL-MCNC: 3.9 MMOL/L — SIGNIFICANT CHANGE UP (ref 3.5–5.3)
POTASSIUM SERPL-MCNC: 3.9 MMOL/L — SIGNIFICANT CHANGE UP (ref 3.5–5.3)
POTASSIUM SERPL-SCNC: 3.9 MMOL/L — SIGNIFICANT CHANGE UP (ref 3.5–5.3)
POTASSIUM SERPL-SCNC: 3.9 MMOL/L — SIGNIFICANT CHANGE UP (ref 3.5–5.3)
PROT SERPL-MCNC: 6.4 G/DL — SIGNIFICANT CHANGE UP (ref 6–8.3)
RBC # BLD: 3.15 M/UL — LOW (ref 4.2–5.8)
RBC # FLD: 14 % — SIGNIFICANT CHANGE UP (ref 10.3–14.5)
SODIUM SERPL-SCNC: 143 MMOL/L — SIGNIFICANT CHANGE UP (ref 135–145)
SODIUM SERPL-SCNC: 143 MMOL/L — SIGNIFICANT CHANGE UP (ref 135–145)
WBC # BLD: 4.32 K/UL — SIGNIFICANT CHANGE UP (ref 3.8–10.5)
WBC # FLD AUTO: 4.32 K/UL — SIGNIFICANT CHANGE UP (ref 3.8–10.5)

## 2019-09-20 PROCEDURE — 99233 SBSQ HOSP IP/OBS HIGH 50: CPT

## 2019-09-20 PROCEDURE — 70553 MRI BRAIN STEM W/O & W/DYE: CPT | Mod: 26

## 2019-09-20 RX ORDER — FLUDROCORTISONE ACETATE 0.1 MG/1
0.1 TABLET ORAL DAILY
Refills: 0 | Status: DISCONTINUED | OUTPATIENT
Start: 2019-09-20 | End: 2019-09-24

## 2019-09-20 RX ORDER — INSULIN GLARGINE 100 [IU]/ML
12 INJECTION, SOLUTION SUBCUTANEOUS AT BEDTIME
Refills: 0 | Status: DISCONTINUED | OUTPATIENT
Start: 2019-09-20 | End: 2019-09-22

## 2019-09-20 RX ORDER — SODIUM CHLORIDE 9 MG/ML
500 INJECTION INTRAMUSCULAR; INTRAVENOUS; SUBCUTANEOUS ONCE
Refills: 0 | Status: COMPLETED | OUTPATIENT
Start: 2019-09-20 | End: 2019-09-20

## 2019-09-20 RX ADMIN — FLUDROCORTISONE ACETATE 0.1 MILLIGRAM(S): 0.1 TABLET ORAL at 14:46

## 2019-09-20 RX ADMIN — Medication 25 MILLIGRAM(S): at 04:44

## 2019-09-20 RX ADMIN — CLOPIDOGREL BISULFATE 75 MILLIGRAM(S): 75 TABLET, FILM COATED ORAL at 08:35

## 2019-09-20 RX ADMIN — Medication 325 MILLIGRAM(S): at 08:35

## 2019-09-20 RX ADMIN — Medication 1 TABLET(S): at 12:54

## 2019-09-20 RX ADMIN — SODIUM CHLORIDE 75 MILLILITER(S): 9 INJECTION INTRAMUSCULAR; INTRAVENOUS; SUBCUTANEOUS at 04:45

## 2019-09-20 RX ADMIN — Medication 1 TABLET(S): at 13:30

## 2019-09-20 RX ADMIN — SODIUM CHLORIDE 250 MILLILITER(S): 9 INJECTION INTRAMUSCULAR; INTRAVENOUS; SUBCUTANEOUS at 14:46

## 2019-09-20 NOTE — PROGRESS NOTE ADULT - PROBLEM SELECTOR PLAN 5
A1c 7.0  at home Levemir 26 units  high suspicion for 2 hypoglycemic events prior to admission, 57 fingerstick on 16, 17-18th blood sugars controlled  decreased to 15 units Lantus, and required 2 units of SSI, will increase to 17 Lantus

## 2019-09-20 NOTE — PROGRESS NOTE ADULT - SUBJECTIVE AND OBJECTIVE BOX
Cardiovascular Disease Progress Note    Overnight events: No acute events overnight.  s/p carotd duplex. denies any new cardiac sx  Otherwise review of systems negative    Objective Findings:  T(C): 36.7 (09-20-19 @ 04:48), Max: 36.7 (09-19-19 @ 14:16)  HR: 69 (09-20-19 @ 04:48) (65 - 70)  BP: 125/77 (09-20-19 @ 04:48) (119/60 - 133/67)  RR: 15 (09-20-19 @ 04:48) (15 - 18)  SpO2: 98% (09-20-19 @ 04:48) (98% - 100%)  Wt(kg): --  Daily     Daily       Physical Exam:  Gen: NAD  HEENT: EOMI  CV: RRR, normal S1 + S2, no m/r/g  Lungs: CTAB  Abd: soft, non-tender  Ext: No edema    Telemetry: af rate controlled    Laboratory Data:                        9.4    4.32  )-----------( 99       ( 20 Sep 2019 05:30 )             29.1     09-20    143  |  105  |  28<H>  ----------------------------<  56<L>  3.9   |  25  |  1.58<H>    Ca    9.3      20 Sep 2019 05:30  Phos  3.5     09-20  Mg     1.9     09-20    TPro  6.4  /  Alb  3.6  /  TBili  0.9  /  DBili  x   /  AST  16  /  ALT  6   /  AlkPhos  69  09-20              Inpatient Medications:  MEDICATIONS  (STANDING):  aspirin enteric coated 325 milliGRAM(s) Oral daily  clopidogrel Tablet 75 milliGRAM(s) Oral daily  dextrose 5%. 1000 milliLiter(s) (50 mL/Hr) IV Continuous <Continuous>  dextrose 50% Injectable 12.5 Gram(s) IV Push once  dextrose 50% Injectable 25 Gram(s) IV Push once  dextrose 50% Injectable 25 Gram(s) IV Push once  influenza   Vaccine 0.5 milliLiter(s) IntraMuscular once  insulin glargine Injectable (LANTUS) 15 Unit(s) SubCutaneous at bedtime  insulin lispro (HumaLOG) corrective regimen sliding scale   SubCutaneous three times a day before meals  metoprolol succinate ER 25 milliGRAM(s) Oral daily      Assessment:  -syncope  -persistent af with LBBB - not on therapeutic AC per patient wishes  -CAD s/p CABG/AVR  -Presyncope 2/2 to vertebrobasilar insufficiency vs orthostatic hypotension v TIA  -IDDM2  -SCC of the right neck dxed in 12/2018 s/p 35 rounds of RT and 7 rounds of chemo   -right humerus fx  -AAA    Recs:  -s/p carotid duplex with antegrade vertebrals and 50-79% bilateral ICA. will inform dr mario of these findings  -awaiting bmri. surgical report of AVR obtained  -check orthostatics  -2d echo without acute findings  -ep consulted given syncope and lbbb appreciated. syncope currently appears orthostatic/vertiginous in origin. no pacing indicatios noted. af rate controlled on toprol 25  -s/p nst fixed inf defect. normal EF. no e/o ischemia  -ac on hold despite elevated [id number] per patient wishes and concern for bleeding  -routine AAA surveillance  -f/u ortho        Over 25 minutes spent on total encounter; more than 50% of the visit was spent counseling and/or coordinating care by the attending physician.      Junior Pena MD   Cardiovascular Disease  [phone]

## 2019-09-20 NOTE — SWALLOW BEDSIDE ASSESSMENT ADULT - SWALLOW EVAL: DIAGNOSIS
Patient demonstrated a Mild Oral Dysphagia characterized by slow mastication and delayed bolus collection and transfer for regular solid textures. Functional oral management noted for puree, soft solids and thin liquids. An adequate pharyngeal stage was characterized by timely initiation of the pharyngeal swallow, adequate laryngeal elevation upon digital palpation and no overt, clinical s/s of laryngeal penetration/aspiration across trials.

## 2019-09-20 NOTE — CHART NOTE - NSCHARTNOTEFT_GEN_A_CORE
Telemetry recording reviewed: AFib rate controlled in 70's.  No phill or pauses seen. No pacing indication.

## 2019-09-20 NOTE — PROGRESS NOTE ADULT - PROBLEM SELECTOR PLAN 4
c/w with home toprolol 25 mg daily  patient was put on Eliquis then coumadin in the past however now will only take aspirin 325mg  CHADS Vasc: 4.8% stroke risk  explained to patient risks of not being on anticoagulation  spoke to cardiologist and updated him, pt had not vocalized complaints of dizziness while being cleared. ECHO was performed but unable to send over report  TTE: Bioprosthetic aortic valve replacemen poorly visualized no comment on AS grossly normal left ventricular systolic function.  Paradoxical septal wall  motion, consistent with prior cardiac surgery  apprec Card recs:  c/s EP, carotid duplex for possible steal, abd sonogram for AAA  f/u final EP recs: possible ILR  Stress test: The left ventricle was normal in size. There are large, moderate defects in inferior, inferoseptal, and inferolateral walls that are fixed, suggestive of infarct with small area of perinfarct and 51% LVEF

## 2019-09-20 NOTE — PROGRESS NOTE ADULT - ASSESSMENT
79 y M with PMH CAD s/p CABG and AVR, Afib not on AC, IDDM2, SCC of the right neck dxed in 12/2018 s/p 35 rounds of RT and 7 rounds of chemo presents on 9/15 with right shoulder pain after sustaining a fall secondary to dizziness and found to have a right shoulder fracture.    + orthostatics and uptrending creatine 79 y M with PMH CAD s/p CABG and AVR, Afib not on AC, IDDM2, SCC of the right neck dxed in 12/2018 s/p 35 rounds of RT and 7 rounds of chemo presents on 9/15 with right shoulder pain after sustaining a fall secondary to dizziness and found to have a right shoulder fracture. 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 on CT brain and neck,  etiology of dizziness, likely secondary to : Pre-syncope secondary to orthostatics, given positive orthostatics on 9/19 PM  versus posterior circulation TIA/small infarct from vertebro basilar insufficiency. Pt is awaiting  MRI with and without to evaluate vertebrobasilar insufficiency, 79 y M with PMH CAD s/p CABG and AVR, Afib not on AC, IDDM2, SCC of the right neck dxed in 12/2018 s/p 35 rounds of RT and 7 rounds of chemo presents on 9/15 with right shoulder pain after sustaining a fall secondary to dizziness and found to have a right shoulder fracture. 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 on CT brain and neck,  etiology of dizziness, likely secondary to : Pre-syncope secondary to orthostatics, given positive orthostatics on 9/19 PM  versus posterior circulation TIA/small infarct from vertebro basilar insufficiency. Pt is awaiting  MRI with and without to evaluate vertebrobasilar insufficiency,      #CKD, stage IIIb   Per chart review of patient's recent Heme/Onc records, patient with CKD baseline creatine around 1.42-1.57, most recent outpatient 1.57 on 5/2019.   - stable, continue to monitor

## 2019-09-20 NOTE — SWALLOW BEDSIDE ASSESSMENT ADULT - COMMENTS
79 y M with PMH CAD s/p CABG and AVR, Afib not on AC, IDDM2, SCC of the right neck dxed in 12/2018 s/p 35 rounds of RT and 7 rounds of chemo presents on 9/15 with right shoulder pain after sustaining a fall secondary to dizziness and found to have a right shoulder fracture. 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 on CT brain and neck,  etiology of dizziness, likely secondary to : Pre-syncope secondary to orthostatics, given positive orthostatics on 9/19 PM  versus posterior circulation TIA/small infarct from vertebro basilar insufficiency. Pt is awaiting  MRI with and without to evaluate vertebrobasilar insufficiency,    Per outpatient ENT / Dr. Portillo notes 9/10/19, patient now with R upper jugular node, was pending DL and biopsy at [facility] which was then postponed due to pt sustaining humerus fracture.    Patient was received awake, alert and cooperative this PM. Patient denies difficulty swallowing at this time. Family members present at bedside.

## 2019-09-20 NOTE — PROGRESS NOTE ADULT - SUBJECTIVE AND OBJECTIVE BOX
Patient is a 79y old  Male who presents with a chief complaint of dizziness (20 Sep 2019 08:26)      SUBJECTIVE / OVERNIGHT EVENTS: Yesterday, + orthostatics - given a liter of fluid. Noted to have uptrending creatine     Review of Systems:     MEDICATIONS  (STANDING):  aspirin enteric coated 325 milliGRAM(s) Oral daily  clopidogrel Tablet 75 milliGRAM(s) Oral daily  dextrose 5%. 1000 milliLiter(s) (50 mL/Hr) IV Continuous <Continuous>  dextrose 50% Injectable 12.5 Gram(s) IV Push once  dextrose 50% Injectable 25 Gram(s) IV Push once  dextrose 50% Injectable 25 Gram(s) IV Push once  influenza   Vaccine 0.5 milliLiter(s) IntraMuscular once  insulin glargine Injectable (LANTUS) 15 Unit(s) SubCutaneous at bedtime  insulin lispro (HumaLOG) corrective regimen sliding scale   SubCutaneous three times a day before meals  metoprolol succinate ER 25 milliGRAM(s) Oral daily    MEDICATIONS  (PRN):  acetaminophen   Tablet .. 975 milliGRAM(s) Oral every 6 hours PRN Mild Pain (1 - 3), Moderate Pain (4 - 6)  acetaminophen 300 mG/codeine 30 mG 1 Tablet(s) Oral every 4 hours PRN mod-sever pain  dextrose 40% Gel 15 Gram(s) Oral once PRN Blood Glucose LESS THAN 70 milliGRAM(s)/deciliter  glucagon  Injectable 1 milliGRAM(s) IntraMuscular once PRN Glucose LESS THAN 70 milligrams/deciliter      PHYSICAL EXAM:  T(C): 36.7 (09-20-19 @ 04:48), Max: 36.7 (09-19-19 @ 14:16)  HR: 69 (09-20-19 @ 04:48) (65 - 70)  BP: 125/77 (09-20-19 @ 04:48) (119/60 - 133/67)  RR: 15 (09-20-19 @ 04:48) (15 - 18)  SpO2: 98% (09-20-19 @ 04:48) (98% - 100%)  I&O's Summary    19 Sep 2019 07:01  -  20 Sep 2019 07:00  --------------------------------------------------------  IN: 820 mL / OUT: 450 mL / NET: 370 mL      GENERAL: NAD, well-developed  HEAD:  Atraumatic, Normocephalic  EYES: EOMI, PERRLA, conjunctiva and sclera clear  NECK: Supple, No elevated JVD  CHEST/LUNG: Clear to auscultation bilaterally; No wheeze  HEART: Regular rate and rhythm; No murmurs, rubs, or gallops  ABDOMEN: Soft, Nontender, Nondistended; Bowel sounds present  EXTREMITIES:  2+ Peripheral Pulses, No clubbing, cyanosis, or edema  PSYCH: AAOx3  NEUROLOGY: CN II-XII grossly intact, moving all extremities  SKIN: No rashes or lesions    LABS:  CAPILLARY BLOOD GLUCOSE      POCT Blood Glucose.: 65 mg/dL (20 Sep 2019 09:12)  POCT Blood Glucose.: 59 mg/dL (20 Sep 2019 08:54)  POCT Blood Glucose.: 50 mg/dL (20 Sep 2019 08:52)  POCT Blood Glucose.: 124 mg/dL (19 Sep 2019 21:09)  POCT Blood Glucose.: 119 mg/dL (19 Sep 2019 17:23)  POCT Blood Glucose.: 105 mg/dL (19 Sep 2019 13:02)                          9.4    4.32  )-----------( 99       ( 20 Sep 2019 05:30 )             29.1     09-20    143  |  105  |  28<H>  ----------------------------<  56<L>  3.9   |  25  |  1.58<H>    Ca    9.3      20 Sep 2019 05:30  Phos  3.5     09-20  Mg     1.9     09-20    TPro  6.4  /  Alb  3.6  /  TBili  0.9  /  DBili  x   /  AST  16  /  ALT  6   /  AlkPhos  69  09-20              RADIOLOGY & ADDITIONAL TESTS:    Telemetry Personally Reviewed -     Imaging Personally Reviewed -     Imaging Reviewed -     Consultant(s) Notes Reviewed -       Care Discussed with Consultants/Other Providers - Patient is a 79y old  Male who presents with a chief complaint of dizziness (20 Sep 2019 08:26)      SUBJECTIVE / OVERNIGHT EVENTS: Yesterday, + orthostatics - given a liter of fluid. Noted to have up- trending creatine. Per team's discussion with Dr. Rodríguez NP at [phone], patient with a tissue valve. Per review of  paperwork in chart, from Dr. David Becerra, patient with AVR (T) - tissue valve. This am, patient reports shoulder pain. Improvement with current pain regimen. Otherwise, denies any current concer    Review of Systems:     MEDICATIONS  (STANDING):  aspirin enteric coated 325 milliGRAM(s) Oral daily  clopidogrel Tablet 75 milliGRAM(s) Oral daily  dextrose 5%. 1000 milliLiter(s) (50 mL/Hr) IV Continuous <Continuous>  dextrose 50% Injectable 12.5 Gram(s) IV Push once  dextrose 50% Injectable 25 Gram(s) IV Push once  dextrose 50% Injectable 25 Gram(s) IV Push once  influenza   Vaccine 0.5 milliLiter(s) IntraMuscular once  insulin glargine Injectable (LANTUS) 15 Unit(s) SubCutaneous at bedtime  insulin lispro (HumaLOG) corrective regimen sliding scale   SubCutaneous three times a day before meals  metoprolol succinate ER 25 milliGRAM(s) Oral daily    MEDICATIONS  (PRN):  acetaminophen   Tablet .. 975 milliGRAM(s) Oral every 6 hours PRN Mild Pain (1 - 3), Moderate Pain (4 - 6)  acetaminophen 300 mG/codeine 30 mG 1 Tablet(s) Oral every 4 hours PRN mod-sever pain  dextrose 40% Gel 15 Gram(s) Oral once PRN Blood Glucose LESS THAN 70 milliGRAM(s)/deciliter  glucagon  Injectable 1 milliGRAM(s) IntraMuscular once PRN Glucose LESS THAN 70 milligrams/deciliter      PHYSICAL EXAM:  T(C): 36.7 (09-20-19 @ 04:48), Max: 36.7 (09-19-19 @ 14:16)  HR: 69 (09-20-19 @ 04:48) (65 - 70)  BP: 125/77 (09-20-19 @ 04:48) (119/60 - 133/67)  RR: 15 (09-20-19 @ 04:48) (15 - 18)  SpO2: 98% (09-20-19 @ 04:48) (98% - 100%)  I&O's Summary    19 Sep 2019 07:01  -  20 Sep 2019 07:00  --------------------------------------------------------  IN: 820 mL / OUT: 450 mL / NET: 370 mL      GENERAL: Elderly gentleman in  NAD, well-developed  HEAD:  Atraumatic, Normocephalic  EYES: EOMI, PERRLA, conjunctiva and sclera clear  NECK: Supple, No elevated JVD  CHEST/LUNG: Clear to auscultation bilaterally; No wheeze  HEART: irregular rate and rhythm; No murmurs, rubs, or gallops  ABDOMEN: Soft, Nontender, Nondistended; Bowel sounds present  EXTREMITIES:  2+ Peripheral Pulses, No clubbing, cyanosis, or edema  PSYCH: AAOx3  NEUROLOGY: CN II-XII grossly intact, moving all extremities  SKIN: No rashes or lesions    LABS:  CAPILLARY BLOOD GLUCOSE      POCT Blood Glucose.: 65 mg/dL (20 Sep 2019 09:12)  POCT Blood Glucose.: 59 mg/dL (20 Sep 2019 08:54)  POCT Blood Glucose.: 50 mg/dL (20 Sep 2019 08:52)  POCT Blood Glucose.: 124 mg/dL (19 Sep 2019 21:09)  POCT Blood Glucose.: 119 mg/dL (19 Sep 2019 17:23)  POCT Blood Glucose.: 105 mg/dL (19 Sep 2019 13:02)                          9.4    4.32  )-----------( 99       ( 20 Sep 2019 05:30 )             29.1     09-20    143  |  105  |  28<H>  ----------------------------<  56<L>  3.9   |  25  |  1.58<H>    Ca    9.3      20 Sep 2019 05:30  Phos  3.5     09-20  Mg     1.9     09-20    TPro  6.4  /  Alb  3.6  /  TBili  0.9  /  DBili  x   /  AST  16  /  ALT  6   /  AlkPhos  69  09-20              RADIOLOGY & ADDITIONAL TESTS:    Telemetry Personally Reviewed - Some PVCs     Imaging Personally Reviewed -     Imaging Reviewed -     Consultant(s) Notes Reviewed -       Care Discussed with Consultants/Other Providers -

## 2019-09-20 NOTE — SWALLOW BEDSIDE ASSESSMENT ADULT - ASR SWALLOW ASPIRATION MONITOR
oral hygiene/pneumonia/fever/throat clearing/change of breathing pattern/position upright (90Y)/gurgly voice/cough/upper respiratory infection

## 2019-09-20 NOTE — PROGRESS NOTE ADULT - PROBLEM SELECTOR PLAN 2
likely BPPV due to positional spinning that occurs with specific motions that is exacerbated with poor PO intake   ddx: includes orthostatic vs autonomous dysfunction vs carotid hypersensitivity although unable to r/o cardiac source due to hx of afib and valvular dysfxn(given long term hx of bovine AVR)and unable to r/o neurologic source due to high ischemia possible due to neck radiation  CT BRAIN/NECK:  No acute intracranial mass effect, bleed, shift. Atheromatous irregularity and mild stenoses of the origins of the ICAs bilaterally measuring less than 50%. Atheromatous calcification along the aortic arch and origin of the great   vessels in bilateral vertebral arteries, likely contributing to focal vertebral stenoses.  EKG: Atrial fibrillation 73 bpm with IVCD  tele to monitor events: rate controlled afib, no acute episodes of RVR or PVCs  orthostatic negative  neuro for further pre op optimization and further evaluation for months worth of worsening dizziness and falls  apprec neuro recs: MRI with and without to evaluate vertebrobasilar insufficiency, aspirin and plavix,   Cr is stable within baseline and bovine AVR so no contraindication for MRI likely BPPV due to positional spinning that occurs with specific motions that is exacerbated with poor PO intake   ddx: includes orthostatic vs autonomous dysfunction vs carotid hypersensitivity although unable to r/o cardiac source due to hx of afib and valvular dysfxn(given long term hx of bovine AVR)and unable to r/o neurologic source due to high ischemia possible due to neck radiation  CT BRAIN/NECK:  No acute intracranial mass effect, bleed, shift. Atheromatous irregularity and mild stenoses of the origins of the ICAs bilaterally measuring less than 50%. Atheromatous calcification along the aortic arch and origin of the great   vessels in bilateral vertebral arteries, likely contributing to focal vertebral stenoses.  EKG: Atrial fibrillation 73 bpm with IVCD  tele to monitor events: rate controlled afib, no acute episodes of RVR or PVCs  repeat orthostatics   neuro for further pre op optimization and further evaluation for months worth of worsening dizziness and falls  apprec neuro recs: MRI with and without to evaluate vertebrobasilar insufficiency, aspirin and plavix,   Cr is stable within baseline and bovine AVR so no contraindication for MRI

## 2019-09-20 NOTE — PROGRESS NOTE ADULT - PROBLEM SELECTOR PLAN 1
ortho recs appreciated : right proximal humerus fracture  Pain control with tylenol with codeine  NWB R UE in sling  encourage active finger motion  no acute orthopedic intervention  Active movement of fingers/wrist/elbow encouraged  Follow up with Dr. Person within 2 weeks, call office for appointment [phone]  no contraindication to ENT surgery if patient is to be in standard supine position, do not manipulate right arm  pt/ot: rehab

## 2019-09-20 NOTE — SWALLOW BEDSIDE ASSESSMENT ADULT - SWALLOW EVAL: RECOMMENDED FEEDING/EATING TECHNIQUES
alternate food with liquid/allow for swallow between intakes/no straws/position upright (90 degrees)/small sips/bites/maintain upright posture during/after eating for 30 mins/oral hygiene

## 2019-09-21 DIAGNOSIS — R55 SYNCOPE AND COLLAPSE: ICD-10-CM

## 2019-09-21 LAB
ANION GAP SERPL CALC-SCNC: 13 MMO/L — SIGNIFICANT CHANGE UP (ref 7–14)
BASOPHILS # BLD AUTO: 0.04 K/UL — SIGNIFICANT CHANGE UP (ref 0–0.2)
BASOPHILS NFR BLD AUTO: 0.9 % — SIGNIFICANT CHANGE UP (ref 0–2)
BUN SERPL-MCNC: 22 MG/DL — SIGNIFICANT CHANGE UP (ref 7–23)
CALCIUM SERPL-MCNC: 9.4 MG/DL — SIGNIFICANT CHANGE UP (ref 8.4–10.5)
CHLORIDE SERPL-SCNC: 102 MMOL/L — SIGNIFICANT CHANGE UP (ref 98–107)
CO2 SERPL-SCNC: 27 MMOL/L — SIGNIFICANT CHANGE UP (ref 22–31)
CREAT SERPL-MCNC: 1.33 MG/DL — HIGH (ref 0.5–1.3)
EOSINOPHIL # BLD AUTO: 0.15 K/UL — SIGNIFICANT CHANGE UP (ref 0–0.5)
EOSINOPHIL NFR BLD AUTO: 3.2 % — SIGNIFICANT CHANGE UP (ref 0–6)
GLUCOSE BLDC GLUCOMTR-MCNC: 116 MG/DL — HIGH (ref 70–99)
GLUCOSE BLDC GLUCOMTR-MCNC: 117 MG/DL — HIGH (ref 70–99)
GLUCOSE BLDC GLUCOMTR-MCNC: 86 MG/DL — SIGNIFICANT CHANGE UP (ref 70–99)
GLUCOSE BLDC GLUCOMTR-MCNC: 87 MG/DL — SIGNIFICANT CHANGE UP (ref 70–99)
GLUCOSE SERPL-MCNC: 94 MG/DL — SIGNIFICANT CHANGE UP (ref 70–99)
HCT VFR BLD CALC: 28.5 % — LOW (ref 39–50)
HGB BLD-MCNC: 9.2 G/DL — LOW (ref 13–17)
IMM GRANULOCYTES NFR BLD AUTO: 0.2 % — SIGNIFICANT CHANGE UP (ref 0–1.5)
LYMPHOCYTES # BLD AUTO: 0.63 K/UL — LOW (ref 1–3.3)
LYMPHOCYTES # BLD AUTO: 13.6 % — SIGNIFICANT CHANGE UP (ref 13–44)
MAGNESIUM SERPL-MCNC: 2 MG/DL — SIGNIFICANT CHANGE UP (ref 1.6–2.6)
MCHC RBC-ENTMCNC: 29.6 PG — SIGNIFICANT CHANGE UP (ref 27–34)
MCHC RBC-ENTMCNC: 32.3 % — SIGNIFICANT CHANGE UP (ref 32–36)
MCV RBC AUTO: 91.6 FL — SIGNIFICANT CHANGE UP (ref 80–100)
MONOCYTES # BLD AUTO: 0.58 K/UL — SIGNIFICANT CHANGE UP (ref 0–0.9)
MONOCYTES NFR BLD AUTO: 12.5 % — SIGNIFICANT CHANGE UP (ref 2–14)
NEUTROPHILS # BLD AUTO: 3.23 K/UL — SIGNIFICANT CHANGE UP (ref 1.8–7.4)
NEUTROPHILS NFR BLD AUTO: 69.6 % — SIGNIFICANT CHANGE UP (ref 43–77)
NRBC # FLD: 0 K/UL — SIGNIFICANT CHANGE UP (ref 0–0)
PLATELET # BLD AUTO: 100 K/UL — LOW (ref 150–400)
PMV BLD: 9.8 FL — SIGNIFICANT CHANGE UP (ref 7–13)
POTASSIUM SERPL-MCNC: 4.2 MMOL/L — SIGNIFICANT CHANGE UP (ref 3.5–5.3)
POTASSIUM SERPL-SCNC: 4.2 MMOL/L — SIGNIFICANT CHANGE UP (ref 3.5–5.3)
RBC # BLD: 3.11 M/UL — LOW (ref 4.2–5.8)
RBC # FLD: 14.1 % — SIGNIFICANT CHANGE UP (ref 10.3–14.5)
SODIUM SERPL-SCNC: 142 MMOL/L — SIGNIFICANT CHANGE UP (ref 135–145)
WBC # BLD: 4.64 K/UL — SIGNIFICANT CHANGE UP (ref 3.8–10.5)
WBC # FLD AUTO: 4.64 K/UL — SIGNIFICANT CHANGE UP (ref 3.8–10.5)

## 2019-09-21 PROCEDURE — 99233 SBSQ HOSP IP/OBS HIGH 50: CPT

## 2019-09-21 RX ORDER — SODIUM CHLORIDE 9 MG/ML
1000 INJECTION INTRAMUSCULAR; INTRAVENOUS; SUBCUTANEOUS
Refills: 0 | Status: DISCONTINUED | OUTPATIENT
Start: 2019-09-21 | End: 2019-09-24

## 2019-09-21 RX ADMIN — Medication 325 MILLIGRAM(S): at 08:54

## 2019-09-21 RX ADMIN — INSULIN GLARGINE 12 UNIT(S): 100 INJECTION, SOLUTION SUBCUTANEOUS at 00:10

## 2019-09-21 RX ADMIN — INSULIN GLARGINE 12 UNIT(S): 100 INJECTION, SOLUTION SUBCUTANEOUS at 22:27

## 2019-09-21 RX ADMIN — SODIUM CHLORIDE 75 MILLILITER(S): 9 INJECTION INTRAMUSCULAR; INTRAVENOUS; SUBCUTANEOUS at 14:09

## 2019-09-21 RX ADMIN — FLUDROCORTISONE ACETATE 0.1 MILLIGRAM(S): 0.1 TABLET ORAL at 06:03

## 2019-09-21 RX ADMIN — Medication 25 MILLIGRAM(S): at 06:03

## 2019-09-21 RX ADMIN — CLOPIDOGREL BISULFATE 75 MILLIGRAM(S): 75 TABLET, FILM COATED ORAL at 08:54

## 2019-09-21 NOTE — PROGRESS NOTE ADULT - PROBLEM SELECTOR PLAN 4
c/w with home toprolol 25 mg daily  on ASA per pt wishes  Stress test: The left ventricle was normal in size. There are large, moderate defects in inferior, inferoseptal, and inferolateral walls that are fixed, suggestive of infarct with small area of perinfarct and 51% LVEF

## 2019-09-21 NOTE — PROGRESS NOTE ADULT - PROBLEM SELECTOR PLAN 1
likely due to orthostatic hypotension  pt with poor PO intake, very dry  trial of IV fluids and reassess orthostatics at pt reports dizziness with standing  poss due to vertibrobasilar insuff, MRI P (pt s/p RT to neck)

## 2019-09-21 NOTE — PROGRESS NOTE ADULT - PROBLEM SELECTOR PLAN 2
ortho recs appreciated : right proximal humerus fracture  Pain control with tylenol with codeine  NWB R UE in sling  encourage active finger motion  no acute orthopedic intervention  Active movement of fingers/wrist/elbow encouraged  Follow up with Dr. Person within 2 weeks, call office for appointment [phone]  no contraindication to ENT surgery if patient is to be in standard supine position, do not manipulate right arm  pt/ot: rehab

## 2019-09-21 NOTE — PROGRESS NOTE ADULT - SUBJECTIVE AND OBJECTIVE BOX
Patient is a 79y old  Male who presents with a chief complaint of dizziness        SUBJECTIVE / OVERNIGHT EVENTS: pt remains with poor PO intake and sig orthostatics; cont to report dizziness upon standing; had long d/w pt and wife regarding adequate PO intake to assist in BP stabilization as this may be the leading cause for this event      MEDICATIONS  (STANDING):  aspirin enteric coated 325 milliGRAM(s) Oral daily  clopidogrel Tablet 75 milliGRAM(s) Oral daily  dextrose 5%. 1000 milliLiter(s) (50 mL/Hr) IV Continuous <Continuous>  dextrose 50% Injectable 12.5 Gram(s) IV Push once  dextrose 50% Injectable 25 Gram(s) IV Push once  dextrose 50% Injectable 25 Gram(s) IV Push once  fludroCORTISONE 0.1 milliGRAM(s) Oral daily  influenza   Vaccine 0.5 milliLiter(s) IntraMuscular once  insulin glargine Injectable (LANTUS) 12 Unit(s) SubCutaneous at bedtime  insulin lispro (HumaLOG) corrective regimen sliding scale   SubCutaneous three times a day before meals  metoprolol succinate ER 25 milliGRAM(s) Oral daily  sodium chloride 0.9%. 1000 milliLiter(s) (75 mL/Hr) IV Continuous <Continuous>    MEDICATIONS  (PRN):  acetaminophen   Tablet .. 975 milliGRAM(s) Oral every 6 hours PRN Mild Pain (1 - 3), Moderate Pain (4 - 6)  acetaminophen 300 mG/codeine 30 mG 1 Tablet(s) Oral every 4 hours PRN mod-sever pain  dextrose 40% Gel 15 Gram(s) Oral once PRN Blood Glucose LESS THAN 70 milliGRAM(s)/deciliter  glucagon  Injectable 1 milliGRAM(s) IntraMuscular once PRN Glucose LESS THAN 70 milligrams/deciliter      Vital Signs Last 24 Hrs  T(F): 97.9 (21 Sep 2019 06:01), Max: 98 (20 Sep 2019 16:25)  HR: 72 (21 Sep 2019 06:01) (72 - 80)  BP: 155/64 (21 Sep 2019 06:01) (120/70 - 155/64)  RR: 17 (21 Sep 2019 06:01) (16 - 17)  SpO2: 99% (21 Sep 2019 06:01) (98% - 100%)        CAPILLARY BLOOD GLUCOSE  POCT Blood Glucose.: 117 mg/dL (21 Sep 2019 12:27)  POCT Blood Glucose.: 87 mg/dL (21 Sep 2019 08:29)  POCT Blood Glucose.: 102 mg/dL (20 Sep 2019 23:34)  POCT Blood Glucose.: 125 mg/dL (20 Sep 2019 17:19)          PHYSICAL EXAM  GENERAL: NAD, well-developed  HEAD:  very dry MM  EYES: EOMI, PERRLA, conjunctiva and sclera clear  CHEST/LUNG: Clear to auscultation bilaterally;   HEART: Regular rate and rhythm;   ABDOMEN: Soft, Nontender, Nondistended; Bowel sounds present  EXTREMITIES: No clubbing, cyanosis, or edema; RUE in sling  PSYCH: AAOx3  SKIN: multiple bruises from blood draws and fall    LABS:                        9.2    4.64  )-----------( 100      ( 21 Sep 2019 05:40 )             28.5     09-21    142  |  102  |  22  ----------------------------<  94  4.2   |  27  |  1.33<H>    Ca    9.4      21 Sep 2019 05:40  Mg     2.0     09-21

## 2019-09-21 NOTE — PROGRESS NOTE ADULT - SUBJECTIVE AND OBJECTIVE BOX
Cardiovascular Disease Progress Note    Overnight events: No acute events overnight.  s/p bmri, awaiting results. no cp/sob/palps. still reports episodes of dizziness when standing  Otherwise review of systems negative    Objective Findings:  T(C): 36.6 (09-21-19 @ 06:01), Max: 36.7 (09-20-19 @ 14:24)  HR: 72 (09-21-19 @ 06:01) (70 - 80)  BP: 155/64 (09-21-19 @ 06:01) (112/67 - 155/64)  RR: 17 (09-21-19 @ 06:01) (16 - 17)  SpO2: 99% (09-21-19 @ 06:01) (98% - 100%)  Wt(kg): --  Daily     Daily       Physical Exam:  Gen: NAD  HEENT: EOMI  CV: RRR, normal S1 + S2, no m/r/g  Lungs: CTAB  Abd: soft, non-tender  Ext: No edema    Telemetry: af rate controlled    Laboratory Data:                        9.2    4.64  )-----------( 100      ( 21 Sep 2019 05:40 )             28.5     09-21    142  |  102  |  22  ----------------------------<  94  4.2   |  27  |  1.33<H>    Ca    9.4      21 Sep 2019 05:40  Phos  3.5     09-20  Mg     2.0     09-21    TPro  6.4  /  Alb  3.6  /  TBili  0.9  /  DBili  x   /  AST  16  /  ALT  6   /  AlkPhos  69  09-20              Inpatient Medications:  MEDICATIONS  (STANDING):  aspirin enteric coated 325 milliGRAM(s) Oral daily  clopidogrel Tablet 75 milliGRAM(s) Oral daily  dextrose 5%. 1000 milliLiter(s) (50 mL/Hr) IV Continuous <Continuous>  dextrose 50% Injectable 12.5 Gram(s) IV Push once  dextrose 50% Injectable 25 Gram(s) IV Push once  dextrose 50% Injectable 25 Gram(s) IV Push once  fludroCORTISONE 0.1 milliGRAM(s) Oral daily  influenza   Vaccine 0.5 milliLiter(s) IntraMuscular once  insulin glargine Injectable (LANTUS) 12 Unit(s) SubCutaneous at bedtime  insulin lispro (HumaLOG) corrective regimen sliding scale   SubCutaneous three times a day before meals  metoprolol succinate ER 25 milliGRAM(s) Oral daily      Assessment:  -syncope  -persistent af with LBBB - not on therapeutic AC per patient wishes  -CAD s/p CABG/AVR  -Presyncope 2/2 to vertebrobasilar insufficiency vs orthostatic hypotension v TIA  -IDDM2  -SCC of the right neck dxed in 12/2018 s/p 35 rounds of RT and 7 rounds of chemo   -right humerus fx  -AAA    Recs:  -s/p carotid duplex with antegrade vertebrals and 50-79% bilateral ICA. informed dr mario of these findings. please have neuro comment on whether this could potentially be contributing to his sx  -s/p bmri. surgical report of AVR obtained  -2d echo without acute findings  -ep consulted given syncope and lbbb appreciated. syncope currently appears orthostatic/vertiginous in origin. no pacing indicatios noted. af rate controlled on toprol 25  -s/p nst fixed inf defect. normal EF. no e/o ischemia  -ac on hold despite elevated [id number] per patient wishes and concern for bleeding  -routine AAA surveillance  -f/u ortho      Over 25 minutes spent on total encounter; more than 50% of the visit was spent counseling and/or coordinating care by the attending physician.      Junior Pena MD   Cardiovascular Disease  [phone]

## 2019-09-21 NOTE — PROGRESS NOTE ADULT - ASSESSMENT
79 y M with PMH CAD s/p CABG and AVR, Afib not on AC, IDDM2, SCC of the right neck dxed in 12/2018 s/p 35 rounds of RT and 7 rounds of chemo presents on 9/15 with right shoulder pain after sustaining a fall secondary to dizziness and found to have a right shoulder fracture.   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  Etiology of dizziness, likely secondary to : Pre-syncope secondary to orthostatics versus posterior circulation TIA/small infarct from vertebro basilar insufficiency.

## 2019-09-22 DIAGNOSIS — E44.0 MODERATE PROTEIN-CALORIE MALNUTRITION: ICD-10-CM

## 2019-09-22 LAB
ANION GAP SERPL CALC-SCNC: 15 MMO/L — HIGH (ref 7–14)
BASOPHILS # BLD AUTO: 0.04 K/UL — SIGNIFICANT CHANGE UP (ref 0–0.2)
BASOPHILS NFR BLD AUTO: 0.8 % — SIGNIFICANT CHANGE UP (ref 0–2)
BUN SERPL-MCNC: 21 MG/DL — SIGNIFICANT CHANGE UP (ref 7–23)
CALCIUM SERPL-MCNC: 9.1 MG/DL — SIGNIFICANT CHANGE UP (ref 8.4–10.5)
CHLORIDE SERPL-SCNC: 102 MMOL/L — SIGNIFICANT CHANGE UP (ref 98–107)
CHOLEST SERPL-MCNC: 136 MG/DL — SIGNIFICANT CHANGE UP (ref 120–199)
CO2 SERPL-SCNC: 25 MMOL/L — SIGNIFICANT CHANGE UP (ref 22–31)
CREAT SERPL-MCNC: 1.2 MG/DL — SIGNIFICANT CHANGE UP (ref 0.5–1.3)
EOSINOPHIL # BLD AUTO: 0.11 K/UL — SIGNIFICANT CHANGE UP (ref 0–0.5)
EOSINOPHIL NFR BLD AUTO: 2.2 % — SIGNIFICANT CHANGE UP (ref 0–6)
GLUCOSE BLDC GLUCOMTR-MCNC: 119 MG/DL — HIGH (ref 70–99)
GLUCOSE BLDC GLUCOMTR-MCNC: 120 MG/DL — HIGH (ref 70–99)
GLUCOSE BLDC GLUCOMTR-MCNC: 137 MG/DL — HIGH (ref 70–99)
GLUCOSE BLDC GLUCOMTR-MCNC: 50 MG/DL — LOW (ref 70–99)
GLUCOSE BLDC GLUCOMTR-MCNC: 50 MG/DL — LOW (ref 70–99)
GLUCOSE BLDC GLUCOMTR-MCNC: 56 MG/DL — LOW (ref 70–99)
GLUCOSE BLDC GLUCOMTR-MCNC: 79 MG/DL — SIGNIFICANT CHANGE UP (ref 70–99)
GLUCOSE BLDC GLUCOMTR-MCNC: 94 MG/DL — SIGNIFICANT CHANGE UP (ref 70–99)
GLUCOSE BLDC GLUCOMTR-MCNC: 98 MG/DL — SIGNIFICANT CHANGE UP (ref 70–99)
GLUCOSE SERPL-MCNC: 54 MG/DL — LOW (ref 70–99)
HCT VFR BLD CALC: 24.8 % — LOW (ref 39–50)
HDLC SERPL-MCNC: 31 MG/DL — LOW (ref 35–55)
HGB BLD-MCNC: 8.1 G/DL — LOW (ref 13–17)
IMM GRANULOCYTES NFR BLD AUTO: 0.2 % — SIGNIFICANT CHANGE UP (ref 0–1.5)
LIPID PNL WITH DIRECT LDL SERPL: 99 MG/DL — SIGNIFICANT CHANGE UP
LYMPHOCYTES # BLD AUTO: 0.55 K/UL — LOW (ref 1–3.3)
LYMPHOCYTES # BLD AUTO: 11 % — LOW (ref 13–44)
MAGNESIUM SERPL-MCNC: 1.9 MG/DL — SIGNIFICANT CHANGE UP (ref 1.6–2.6)
MCHC RBC-ENTMCNC: 29.9 PG — SIGNIFICANT CHANGE UP (ref 27–34)
MCHC RBC-ENTMCNC: 32.7 % — SIGNIFICANT CHANGE UP (ref 32–36)
MCV RBC AUTO: 91.5 FL — SIGNIFICANT CHANGE UP (ref 80–100)
MONOCYTES # BLD AUTO: 0.56 K/UL — SIGNIFICANT CHANGE UP (ref 0–0.9)
MONOCYTES NFR BLD AUTO: 11.2 % — SIGNIFICANT CHANGE UP (ref 2–14)
NEUTROPHILS # BLD AUTO: 3.72 K/UL — SIGNIFICANT CHANGE UP (ref 1.8–7.4)
NEUTROPHILS NFR BLD AUTO: 74.6 % — SIGNIFICANT CHANGE UP (ref 43–77)
NRBC # FLD: 0.02 K/UL — SIGNIFICANT CHANGE UP (ref 0–0)
PLATELET # BLD AUTO: 100 K/UL — LOW (ref 150–400)
PMV BLD: 10.2 FL — SIGNIFICANT CHANGE UP (ref 7–13)
POTASSIUM SERPL-MCNC: 3.8 MMOL/L — SIGNIFICANT CHANGE UP (ref 3.5–5.3)
POTASSIUM SERPL-SCNC: 3.8 MMOL/L — SIGNIFICANT CHANGE UP (ref 3.5–5.3)
RBC # BLD: 2.71 M/UL — LOW (ref 4.2–5.8)
RBC # FLD: 14 % — SIGNIFICANT CHANGE UP (ref 10.3–14.5)
SODIUM SERPL-SCNC: 142 MMOL/L — SIGNIFICANT CHANGE UP (ref 135–145)
TRIGL SERPL-MCNC: 92 MG/DL — SIGNIFICANT CHANGE UP (ref 10–149)
WBC # BLD: 4.99 K/UL — SIGNIFICANT CHANGE UP (ref 3.8–10.5)
WBC # FLD AUTO: 4.99 K/UL — SIGNIFICANT CHANGE UP (ref 3.8–10.5)

## 2019-09-22 PROCEDURE — 99233 SBSQ HOSP IP/OBS HIGH 50: CPT

## 2019-09-22 PROCEDURE — 99356: CPT

## 2019-09-22 RX ORDER — INSULIN GLARGINE 100 [IU]/ML
8 INJECTION, SOLUTION SUBCUTANEOUS AT BEDTIME
Refills: 0 | Status: DISCONTINUED | OUTPATIENT
Start: 2019-09-22 | End: 2019-09-24

## 2019-09-22 RX ADMIN — Medication 1 TABLET(S): at 10:45

## 2019-09-22 RX ADMIN — Medication 325 MILLIGRAM(S): at 09:58

## 2019-09-22 RX ADMIN — Medication 25 MILLIGRAM(S): at 05:43

## 2019-09-22 RX ADMIN — CLOPIDOGREL BISULFATE 75 MILLIGRAM(S): 75 TABLET, FILM COATED ORAL at 09:58

## 2019-09-22 RX ADMIN — INSULIN GLARGINE 8 UNIT(S): 100 INJECTION, SOLUTION SUBCUTANEOUS at 22:11

## 2019-09-22 RX ADMIN — Medication 1 TABLET(S): at 09:58

## 2019-09-22 RX ADMIN — FLUDROCORTISONE ACETATE 0.1 MILLIGRAM(S): 0.1 TABLET ORAL at 05:43

## 2019-09-22 NOTE — PROVIDER CONTACT NOTE (OTHER) - BACKGROUND
(Admit Diagnosis) Closed fracture of proximal end of right humerus  (PMH) Malignant neoplasm of tongue  (PMH) Afib  (PMH) Pleural Effusion
Admit Diagnosis) Closed fracture of proximal end of right humerus  (PMH) Malignant neoplasm of tongue  (PMH) Afib  (PMH) Pleural Effusion
Admitted for right humerus fracture.
Admit Diagnosis) Closed fracture of proximal end of right humerus  (PMH) Malignant neoplasm of tongue  (PMH) Afib  (PMH) Pleural Effusion
PT is T2DM.  Hypoglycemic in the past. PT is aware of hypoglycemia.

## 2019-09-22 NOTE — PROVIDER CONTACT NOTE (OTHER) - NAME OF MD/NP/PA/DO NOTIFIED:
HS4 Karlene Naidu
annetta Arrieta
annetta Arrieta
tele LITZY Phipps
tele LITZY morris at bedside
ACP MD Ana
home

## 2019-09-22 NOTE — PROVIDER CONTACT NOTE (OTHER) - ASSESSMENT
n0 acute distress noted  fall safety maintained   pt currently asymptomatic at this time
vitals stable   no acute distress   fall safety precautions
Hypoglycemic of 50 verified by 50. PT awake and alert.

## 2019-09-22 NOTE — PROGRESS NOTE ADULT - PROBLEM SELECTOR PLAN 2
ortho recs appreciated : right proximal humerus fracture  Pain control with tylenol with codeine  NWB R UE in sling  encourage active finger motion  no acute orthopedic intervention  Active movement of fingers/wrist/elbow encouraged  Follow up with Dr. Person within 2 weeks, call office for appointment [phone]  no contraindication to ENT surgery if patient is to be in standard supine position, do not manipulate right arm  pt/ot: rehab

## 2019-09-22 NOTE — PROGRESS NOTE ADULT - SUBJECTIVE AND OBJECTIVE BOX
Patient is a 79y old  Male who presents with a chief complaint of dizziness         SUBJECTIVE / OVERNIGHT EVENTS: still with poor po intake; wife at bedside; long discussion about importance of intake as it is impacting the dizziness, in combination with radiation damage to neck and vasculature; pt agreeable to try glucerna and ensure pudding;  FS low due to poor intake; encouraged pt to liberalize diet even with sweets because the glucose can be managed; pt/wife express understanding      MEDICATIONS  (STANDING):  aspirin enteric coated 325 milliGRAM(s) Oral daily  clopidogrel Tablet 75 milliGRAM(s) Oral daily  dextrose 5%. 1000 milliLiter(s) (50 mL/Hr) IV Continuous <Continuous>  dextrose 50% Injectable 12.5 Gram(s) IV Push once  dextrose 50% Injectable 25 Gram(s) IV Push once  dextrose 50% Injectable 25 Gram(s) IV Push once  fludroCORTISONE 0.1 milliGRAM(s) Oral daily  influenza   Vaccine 0.5 milliLiter(s) IntraMuscular once  insulin glargine Injectable (LANTUS) 8 Unit(s) SubCutaneous at bedtime  insulin lispro (HumaLOG) corrective regimen sliding scale   SubCutaneous three times a day before meals  metoprolol succinate ER 25 milliGRAM(s) Oral daily  sodium chloride 0.9%. 1000 milliLiter(s) (75 mL/Hr) IV Continuous <Continuous>    MEDICATIONS  (PRN):  acetaminophen   Tablet .. 975 milliGRAM(s) Oral every 6 hours PRN Mild Pain (1 - 3), Moderate Pain (4 - 6)  acetaminophen 300 mG/codeine 30 mG 1 Tablet(s) Oral every 4 hours PRN mod-sever pain  dextrose 40% Gel 15 Gram(s) Oral once PRN Blood Glucose LESS THAN 70 milliGRAM(s)/deciliter  glucagon  Injectable 1 milliGRAM(s) IntraMuscular once PRN Glucose LESS THAN 70 milligrams/deciliter      Vital Signs Last 24 Hrs  T(F): 98.8 (22 Sep 2019 11:36), Max: 98.8 (22 Sep 2019 11:36)  HR: 64 (22 Sep 2019 11:36) (64 - 77)  BP: 117/43 (22 Sep 2019 11:36) (117/43 - 137/65)  RR: 17 (22 Sep 2019 11:36) (17 - 18)  SpO2: 100% (22 Sep 2019 11:36) (97% - 100%)        CAPILLARY BLOOD GLUCOSE  POCT Blood Glucose.: 137 mg/dL (22 Sep 2019 12:16)  POCT Blood Glucose.: 120 mg/dL (22 Sep 2019 10:26)  POCT Blood Glucose.: 94 mg/dL (22 Sep 2019 10:01)  POCT Blood Glucose.: 79 mg/dL (22 Sep 2019 09:42)  POCT Blood Glucose.: 56 mg/dL (22 Sep 2019 09:24)  POCT Blood Glucose.: 50 mg/dL (22 Sep 2019 08:57)  POCT Blood Glucose.: 50 mg/dL (22 Sep 2019 08:31)  POCT Blood Glucose.: 86 mg/dL (21 Sep 2019 22:04)  POCT Blood Glucose.: 116 mg/dL (21 Sep 2019 17:13)          PHYSICAL EXAM  GENERAL: NAD, well-developed  EYES: EOMI, PERRLA, conjunctiva and sclera clear  CHEST/LUNG: Clear to auscultation bilaterally;   HEART: Regular rate and rhythm;   ABDOMEN: Soft, Nontender, Nondistended; Bowel sounds present  EXTREMITIES: No clubbing, cyanosis, or edema; RUE in sling; tender to any movement/touch  PSYCH: AAOx3      LABS:                        8.1    4.99  )-----------( 100      ( 22 Sep 2019 05:50 )             24.8     09-22    142  |  102  |  21  ----------------------------<  54<L>  3.8   |  25  |  1.20    Ca    9.1      22 Sep 2019 05:50  Mg     1.9     09-22

## 2019-09-22 NOTE — PROGRESS NOTE ADULT - PROBLEM SELECTOR PLAN 1
likely due to orthostatic hypotension  pt with poor PO intake, very dry  still orthostatic, cont IVF; liberalize diet  MRI negative for acute pathology

## 2019-09-22 NOTE — PROVIDER CONTACT NOTE (OTHER) - REASON
Patient is refusing to be repositioned in bed confused and agitated.
fingerstick 121
fingerstick 144
fingerstick 65
noted with positive orthostatics
noted with positive orthostatics
unable to have MRI done due to heart valve
fingerstick 50 than repeat 59
Hypoglycemia

## 2019-09-22 NOTE — PROVIDER CONTACT NOTE (OTHER) - ACTION/TREATMENT ORDERED:
repeat in 15 mins
Start hypoglycemia protocol.
Notified HS4. Will continue to monitor
no need to repeat till lunch
no new orders obtained   tele PA and attending to follow up with family
no new orders obtained as of yet
repeat in 15 mins
repeat in 15 mins
will give IVF when pt arrives from MRI

## 2019-09-22 NOTE — PROVIDER CONTACT NOTE (OTHER) - SITUATION
Patient is refusing to be repositioned confused and agitated. Tried reorienting patient.
fingerstick 121
fingerstick 144
fingerstick 50 than repeat 59
noted with positive orthostatics
noted with positive orthostatics
unable to have MRI done due to heart valve
fingerstick 50 than repeat 59
PT is Hypoglyemic at 50 verified by 50.

## 2019-09-22 NOTE — PROVIDER CONTACT NOTE (OTHER) - DATE AND TIME:
17-Sep-2019 03:24
19-Sep-2019 15:00
19-Sep-2019 16:38
20-Sep-2019 09:13
20-Sep-2019 09:30
20-Sep-2019 09:57
20-Sep-2019 14:12
20-Sep-2019 08:56
22-Sep-2019 10:05

## 2019-09-23 LAB
ANION GAP SERPL CALC-SCNC: 14 MMO/L — SIGNIFICANT CHANGE UP (ref 7–14)
BASOPHILS # BLD AUTO: 0.03 K/UL — SIGNIFICANT CHANGE UP (ref 0–0.2)
BASOPHILS NFR BLD AUTO: 0.4 % — SIGNIFICANT CHANGE UP (ref 0–2)
BUN SERPL-MCNC: 22 MG/DL — SIGNIFICANT CHANGE UP (ref 7–23)
CALCIUM SERPL-MCNC: 8.7 MG/DL — SIGNIFICANT CHANGE UP (ref 8.4–10.5)
CHLORIDE SERPL-SCNC: 106 MMOL/L — SIGNIFICANT CHANGE UP (ref 98–107)
CO2 SERPL-SCNC: 21 MMOL/L — LOW (ref 22–31)
CREAT SERPL-MCNC: 1.2 MG/DL — SIGNIFICANT CHANGE UP (ref 0.5–1.3)
EOSINOPHIL # BLD AUTO: 0.01 K/UL — SIGNIFICANT CHANGE UP (ref 0–0.5)
EOSINOPHIL NFR BLD AUTO: 0.1 % — SIGNIFICANT CHANGE UP (ref 0–6)
GLUCOSE BLDC GLUCOMTR-MCNC: 146 MG/DL — HIGH (ref 70–99)
GLUCOSE BLDC GLUCOMTR-MCNC: 158 MG/DL — HIGH (ref 70–99)
GLUCOSE BLDC GLUCOMTR-MCNC: 179 MG/DL — HIGH (ref 70–99)
GLUCOSE BLDC GLUCOMTR-MCNC: 197 MG/DL — HIGH (ref 70–99)
GLUCOSE SERPL-MCNC: 174 MG/DL — HIGH (ref 70–99)
HCT VFR BLD CALC: 25.4 % — LOW (ref 39–50)
HGB BLD-MCNC: 8.5 G/DL — LOW (ref 13–17)
IMM GRANULOCYTES NFR BLD AUTO: 0.4 % — SIGNIFICANT CHANGE UP (ref 0–1.5)
LYMPHOCYTES # BLD AUTO: 0.23 K/UL — LOW (ref 1–3.3)
LYMPHOCYTES # BLD AUTO: 3.1 % — LOW (ref 13–44)
MAGNESIUM SERPL-MCNC: 1.8 MG/DL — SIGNIFICANT CHANGE UP (ref 1.6–2.6)
MCHC RBC-ENTMCNC: 30.7 PG — SIGNIFICANT CHANGE UP (ref 27–34)
MCHC RBC-ENTMCNC: 33.5 % — SIGNIFICANT CHANGE UP (ref 32–36)
MCV RBC AUTO: 91.7 FL — SIGNIFICANT CHANGE UP (ref 80–100)
MONOCYTES # BLD AUTO: 0.5 K/UL — SIGNIFICANT CHANGE UP (ref 0–0.9)
MONOCYTES NFR BLD AUTO: 6.7 % — SIGNIFICANT CHANGE UP (ref 2–14)
NEUTROPHILS # BLD AUTO: 6.65 K/UL — SIGNIFICANT CHANGE UP (ref 1.8–7.4)
NEUTROPHILS NFR BLD AUTO: 89.3 % — HIGH (ref 43–77)
NRBC # FLD: 0 K/UL — SIGNIFICANT CHANGE UP (ref 0–0)
PLATELET # BLD AUTO: 107 K/UL — LOW (ref 150–400)
PMV BLD: 10.2 FL — SIGNIFICANT CHANGE UP (ref 7–13)
POTASSIUM SERPL-MCNC: 4.4 MMOL/L — SIGNIFICANT CHANGE UP (ref 3.5–5.3)
POTASSIUM SERPL-SCNC: 4.4 MMOL/L — SIGNIFICANT CHANGE UP (ref 3.5–5.3)
RBC # BLD: 2.77 M/UL — LOW (ref 4.2–5.8)
RBC # FLD: 14 % — SIGNIFICANT CHANGE UP (ref 10.3–14.5)
SODIUM SERPL-SCNC: 141 MMOL/L — SIGNIFICANT CHANGE UP (ref 135–145)
WBC # BLD: 7.45 K/UL — SIGNIFICANT CHANGE UP (ref 3.8–10.5)
WBC # FLD AUTO: 7.45 K/UL — SIGNIFICANT CHANGE UP (ref 3.8–10.5)

## 2019-09-23 PROCEDURE — 99233 SBSQ HOSP IP/OBS HIGH 50: CPT

## 2019-09-23 RX ORDER — SENNA PLUS 8.6 MG/1
2 TABLET ORAL AT BEDTIME
Refills: 0 | Status: DISCONTINUED | OUTPATIENT
Start: 2019-09-23 | End: 2019-09-24

## 2019-09-23 RX ADMIN — Medication 2: at 09:15

## 2019-09-23 RX ADMIN — SODIUM CHLORIDE 75 MILLILITER(S): 9 INJECTION INTRAMUSCULAR; INTRAVENOUS; SUBCUTANEOUS at 17:57

## 2019-09-23 RX ADMIN — INSULIN GLARGINE 8 UNIT(S): 100 INJECTION, SOLUTION SUBCUTANEOUS at 22:38

## 2019-09-23 RX ADMIN — Medication 2: at 17:56

## 2019-09-23 RX ADMIN — Medication 25 MILLIGRAM(S): at 05:45

## 2019-09-23 RX ADMIN — Medication 975 MILLIGRAM(S): at 11:38

## 2019-09-23 RX ADMIN — FLUDROCORTISONE ACETATE 0.1 MILLIGRAM(S): 0.1 TABLET ORAL at 05:45

## 2019-09-23 RX ADMIN — Medication 325 MILLIGRAM(S): at 09:15

## 2019-09-23 RX ADMIN — Medication 975 MILLIGRAM(S): at 12:10

## 2019-09-23 RX ADMIN — CLOPIDOGREL BISULFATE 75 MILLIGRAM(S): 75 TABLET, FILM COATED ORAL at 09:15

## 2019-09-23 RX ADMIN — Medication 2: at 13:30

## 2019-09-23 NOTE — DIETITIAN INITIAL EVALUATION ADULT. - PERTINENT MEDS FT
MEDICATIONS  (STANDING):  aspirin enteric coated 325 milliGRAM(s) Oral daily  clopidogrel Tablet 75 milliGRAM(s) Oral daily  dextrose 5%. 1000 milliLiter(s) (50 mL/Hr) IV Continuous <Continuous>  dextrose 50% Injectable 12.5 Gram(s) IV Push once  dextrose 50% Injectable 25 Gram(s) IV Push once  dextrose 50% Injectable 25 Gram(s) IV Push once  fludroCORTISONE 0.1 milliGRAM(s) Oral daily  influenza   Vaccine 0.5 milliLiter(s) IntraMuscular once  insulin glargine Injectable (LANTUS) 8 Unit(s) SubCutaneous at bedtime  insulin lispro (HumaLOG) corrective regimen sliding scale   SubCutaneous three times a day before meals  metoprolol succinate ER 25 milliGRAM(s) Oral daily  senna 2 Tablet(s) Oral at bedtime  sodium chloride 0.9%. 1000 milliLiter(s) (75 mL/Hr) IV Continuous <Continuous>

## 2019-09-23 NOTE — DIETITIAN INITIAL EVALUATION ADULT. - ENERGY NEEDS
Ht: 71 in Wt: (9/17 dosing)  175.9 pounds BMI: 24.5   IBW: 172 pounds   Edema: 1+ B/L legs  Pressure Injuries: none

## 2019-09-23 NOTE — PROGRESS NOTE ADULT - PROBLEM SELECTOR PLAN 2
ortho recs appreciated : right proximal humerus fracture  Pain control with tylenol with codeine  NWB R UE in sling  encourage active finger motion  no acute orthopedic intervention  Active movement of fingers/wrist/elbow encouraged  Follow up with Dr. Person within 2 weeks, call office for appointment [phone]  no contraindication to ENT surgery if patient is to be in standard supine position, do not manipulate right arm  PT very particular about having multiple people to get him out of bed; very reluctant to participate

## 2019-09-23 NOTE — PROGRESS NOTE ADULT - SUBJECTIVE AND OBJECTIVE BOX
Patient is a 79y old  Male who presents with a chief complaint of dizziness (23 Sep 2019 12:52)  reports slightly improved PO intake.  Denies dizziness when got up +mild orthostatic hypotension.  Asymptomatic with 35 beats WCT at 135 bpm likely NSVT    PAST MEDICAL & SURGICAL HISTORY:  Malignant neoplasm of tongue  Afib  Pleural Effusion  Viral Meningitis  Diabetes Mellitus  AAA (Abdominal Aortic Aneurysm): hx of  Gallstones  History of bronchoscopy: right VAT; 2010  History of back surgery: 2016  H/O peripheral artery bypass: RLE; 2016  H/O aortic valve replacement: 2009  History of toe surgery: right 4th toe; 2016  Cataract: bilateral IOL  Cataract  S/P Laparoscopic Cholecystectomy: 10/2009  Aortocoronary Artery Bypass Graft (ICD9 V45.81)  History of Aortic Valve Replacement (ICD9 V43.3)  S/P Tonsillectomy  History of Appendectomy      MEDICATIONS  (STANDING):  aspirin enteric coated 325 milliGRAM(s) Oral daily  clopidogrel Tablet 75 milliGRAM(s) Oral daily  dextrose 5%. 1000 milliLiter(s) (50 mL/Hr) IV Continuous <Continuous>  dextrose 50% Injectable 12.5 Gram(s) IV Push once  dextrose 50% Injectable 25 Gram(s) IV Push once  dextrose 50% Injectable 25 Gram(s) IV Push once  fludroCORTISONE 0.1 milliGRAM(s) Oral daily  influenza   Vaccine 0.5 milliLiter(s) IntraMuscular once  insulin glargine Injectable (LANTUS) 8 Unit(s) SubCutaneous at bedtime  insulin lispro (HumaLOG) corrective regimen sliding scale   SubCutaneous three times a day before meals  metoprolol succinate ER 25 milliGRAM(s) Oral daily  senna 2 Tablet(s) Oral at bedtime  sodium chloride 0.9%. 1000 milliLiter(s) (75 mL/Hr) IV Continuous <Continuous>    MEDICATIONS  (PRN):  acetaminophen   Tablet .. 975 milliGRAM(s) Oral every 6 hours PRN Mild Pain (1 - 3), Moderate Pain (4 - 6)  acetaminophen 300 mG/codeine 30 mG 1 Tablet(s) Oral every 4 hours PRN mod-sever pain  dextrose 40% Gel 15 Gram(s) Oral once PRN Blood Glucose LESS THAN 70 milliGRAM(s)/deciliter  glucagon  Injectable 1 milliGRAM(s) IntraMuscular once PRN Glucose LESS THAN 70 milligrams/deciliter            Vital Signs Last 24 Hrs  T(C): 36.5 (23 Sep 2019 12:38), Max: 37.2 (22 Sep 2019 22:08)  T(F): 97.7 (23 Sep 2019 12:38), Max: 98.9 (22 Sep 2019 22:08)  HR: 75 (23 Sep 2019 12:38) (75 - 92)  BP: 102/61 (23 Sep 2019 12:38) (102/61 - 160/81)  BP(mean): --  RR: 17 (23 Sep 2019 12:38) (17 - 18)  SpO2: 98% (23 Sep 2019 12:38) (98% - 100%)            INTERPRETATION OF TELEMETRY: AFib 60-70 bpm; one 35 beats of WCT at 135 bpm      ECG:        LABS:                        8.5    7.45  )-----------( 107      ( 23 Sep 2019 04:20 )             25.4     09-23    141  |  106  |  22  ----------------------------<  174<H>  4.4   |  21<L>  |  1.20    Ca    8.7      23 Sep 2019 04:20  Mg     1.8     09-23                BNP  RADIOLOGY & ADDITIONAL STUDIES:    CONCLUSIONS:  Technically very difficult study.  1. Mitral annular calcification, otherwise normal mitral  valve. Mild mitral regurgitation.  2. Bioprosthetic aortic valve replacement, which was poorly  visualized.  3. Endocardium not well visualized; grossly normal left  ventricular systolic function.  Paradoxical septal wall  motion, consistent with prior cardiac surgery.  Endocardial  visualization enhanced with intravenous injection of echo  contrast (Definity).  4. Unable to accurately evaluate right ventricular size or  systolic function.  ------------------------------------------------------------------------  Confirmed on  9/17/2019 - 11:16:32 by Yan Santa M.D.  ------------------------------------------------------------------------    PHYSICAL EXAM:    GENERAL: In no apparent distress, frail looking  NECK: Supple and normal thyroid.  No JVD or carotid bruit.  Carotid pulse is 2+ bilaterally.  HEART: Regular rate and rhythm; 2/6 KASSANDRA, no rubs, or gallops.  PULMONARY: Clear to auscultation and perfusion.  No rales, wheezing, or rhonchi bilaterally.  ABDOMEN: Soft, Nontender, Nondistended; Bowel sounds present  EXTREMITIES:  2+ Peripheral Pulses, No clubbing, cyanosis, or edema  NEUROLOGICAL: Grossly nonfocal

## 2019-09-23 NOTE — DIETITIAN INITIAL EVALUATION ADULT. - FACTORS AFF FOOD INTAKE
SCC of tongue/difficulty swallowing/persistent lack of appetite/change in sense of smell or taste/pain

## 2019-09-23 NOTE — PROGRESS NOTE ADULT - SUBJECTIVE AND OBJECTIVE BOX
Patient is a 79y old  Male who presents with a chief complaint of dizziness         SUBJECTIVE / OVERNIGHT EVENTS: remains with poor PO intake; wife says pt making her upset because he's asking for answers why he's weak; reminded her this is the same discussions over the weekend many times; pt getting more grumpy      MEDICATIONS  (STANDING):  aspirin enteric coated 325 milliGRAM(s) Oral daily  clopidogrel Tablet 75 milliGRAM(s) Oral daily  dextrose 5%. 1000 milliLiter(s) (50 mL/Hr) IV Continuous <Continuous>  dextrose 50% Injectable 12.5 Gram(s) IV Push once  dextrose 50% Injectable 25 Gram(s) IV Push once  dextrose 50% Injectable 25 Gram(s) IV Push once  fludroCORTISONE 0.1 milliGRAM(s) Oral daily  influenza   Vaccine 0.5 milliLiter(s) IntraMuscular once  insulin glargine Injectable (LANTUS) 8 Unit(s) SubCutaneous at bedtime  insulin lispro (HumaLOG) corrective regimen sliding scale   SubCutaneous three times a day before meals  metoprolol succinate ER 25 milliGRAM(s) Oral daily  sodium chloride 0.9%. 1000 milliLiter(s) (75 mL/Hr) IV Continuous <Continuous>    MEDICATIONS  (PRN):  acetaminophen   Tablet .. 975 milliGRAM(s) Oral every 6 hours PRN Mild Pain (1 - 3), Moderate Pain (4 - 6)  acetaminophen 300 mG/codeine 30 mG 1 Tablet(s) Oral every 4 hours PRN mod-sever pain  dextrose 40% Gel 15 Gram(s) Oral once PRN Blood Glucose LESS THAN 70 milliGRAM(s)/deciliter  glucagon  Injectable 1 milliGRAM(s) IntraMuscular once PRN Glucose LESS THAN 70 milligrams/deciliter      Vital Signs Last 24 Hrs  T(F): 97.7 (23 Sep 2019 12:38), Max: 98.9 (22 Sep 2019 22:08)  HR: 75 (23 Sep 2019 12:38) (75 - 92)  BP: 102/61 (23 Sep 2019 12:38) (102/61 - 160/81)  RR: 17 (23 Sep 2019 12:38) (17 - 18)  SpO2: 98% (23 Sep 2019 12:38) (98% - 100%)      CAPILLARY BLOOD GLUCOSE  POCT Blood Glucose.: 197 mg/dL (23 Sep 2019 12:31)  POCT Blood Glucose.: 179 mg/dL (23 Sep 2019 08:25)  POCT Blood Glucose.: 98 mg/dL (22 Sep 2019 21:45)  POCT Blood Glucose.: 119 mg/dL (22 Sep 2019 17:31)          PHYSICAL EXAM  GENERAL: NAD, well-developed  HEAD: dry MM  EYES: EOMI, PERRLA, conjunctiva and sclera clear  CHEST/LUNG: Clear to auscultation bilaterally;   HEART: Regular rate and rhythm;   ABDOMEN: Soft, Nontender, Nondistended; Bowel sounds present  EXTREMITIES: No clubbing, cyanosis, or edema  PSYCH: AAOx3      LABS:                        8.5    7.45  )-----------( 107      ( 23 Sep 2019 04:20 )             25.4     09-23    141  |  106  |  22  ----------------------------<  174<H>  4.4   |  21<L>  |  1.20    Ca    8.7      23 Sep 2019 04:20  Mg     1.8     09-23

## 2019-09-23 NOTE — DIETITIAN INITIAL EVALUATION ADULT. - ADD RECOMMEND
1. Continue dysphagia 3 diet. Agree with MD to keep diet liberalized from consistent carbohydrate in order to prevent hypoglycemic episodes. 2. Ensure Enlive 3x daily (1050 scooter and 60 gm protein) to help optimize protein energy intake. 3. Encourage PO intake and honor food preferences as able.

## 2019-09-23 NOTE — PROGRESS NOTE ADULT - ASSESSMENT
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  Patient with recurrent falls x 3-4 months but denies syncope history.  EKG and telemetry show rate controlled atrial fibrillation. Stress test with fixed defects in inferior, inferoseptal and inferolateral walls with severe hypokinesis, EF 51%.  Etiology of falls unclear but symptoms sound most like vertigo.   Patient may benefit from an ILR for prolonged monitoring prior to discharge.  Currently no clear pacing indication.   MRI negative for acute pathology.  +mild orthostatic hypotension with SBP drop from 108 to 95.  Telemetry recorded one 35 beats WCT ?NSVT.  Patient was asymptomatic.  AFib rate control without phill or pauses seen.    -Continue telemetry monitoring  -Continue Metoprolol 25 mg daily  -May benefit from implantable loop recorder (can be done prior to discharge)  -Continue care per primary team

## 2019-09-23 NOTE — PROGRESS NOTE ADULT - ASSESSMENT
79 y M with PMH CAD s/p CABG and AVR, Afib not on AC, IDDM2, SCC of the right neck dxed in 12/2018 s/p 35 rounds of RT and 7 rounds of chemo presents on 9/15 with right shoulder pain after sustaining a fall secondary to dizziness and found to have a right shoulder fracture.   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  Etiology of dizziness, likely secondary to : Pre-syncope secondary to orthostatics versus posterior circulation TIA/small infarct from vertebro basilar insufficiency. 18-Jan-2018 20:56

## 2019-09-23 NOTE — DIETITIAN INITIAL EVALUATION ADULT. - PERTINENT LABORATORY DATA
09-23 Na 141 mmol/L Glu 174 mg/dL<H> K+ 4.4 mmol/L Cr 1.20 mg/dL BUN 22 mg/dL Phos n/a    09-13-19 HbA1c 7.0 %  09-23 @ 12:31 POCT 197 mg/dL  09-23 @ 08:25 POCT 179 mg/dL  09-22 @ 21:45 POCT 98 mg/dL  09-22 @ 17:31 POCT 119 mg/dL

## 2019-09-23 NOTE — DIETITIAN INITIAL EVALUATION ADULT. - OTHER INFO
78 y/o M with DM2 (HbA1c 7.0), CAD, SCC of R kneck s/p 35 rounds of RT and 7 rounds of chemo presents s/p fall and is being worked up for dizziness. Pt reports poor PO intake <50% meals during 7 day admission. Pt also noted with hypoglycemia (FS @ 50 mg/dL) 9/20 and 9/22. Pt amenable to strawberry Ensure. Pt with 40 pound wt loss since Jan 2019 (confirmed via [employer] weight history) and has maintained current weight but unable to regain any weight. Pt was drinking Glucerna 8 fl oz 5x daily with soft solids.  +taste changes- "everything tastes horrible" and + swallowing difficulty with swallow recommendations for dysphagia 3 soft, thin liquids. Food preferences obtained; wife is bringing some snacks from home.

## 2019-09-23 NOTE — CHART NOTE - NSCHARTNOTEFT_GEN_A_CORE
NUTRITION SERVICES     Upon Nutritional Assessment by the Registered Dietitian your patient was determined to meet criteria/ has evidence of the following diagnosis/diagnoses:  [ ] Mild Protein Calorie Malnutrition   [x ] Moderate Protein Calorie Malnutrition   [ ] Severe Protein Calorie Malnutrition   [ ] Unspecified Protein Calorie Malnutrition   [ ] Underweight / BMI <19  [ ] Morbid Obesity / BMI >40    Findings as based on:  •  Comprehensive nutritional assessment and consultation    Please refer to Initial Dietitian Evaluation via documents section of [facility] EMR for further recommendations. NUTRITION SERVICES     Upon Nutritional Assessment by the Registered Dietitian your patient was determined to meet criteria/ has evidence of the following diagnosis/diagnoses:  [ ] Mild Protein Calorie Malnutrition   [x ] Moderate Protein Calorie Malnutrition   [ ] Severe Protein Calorie Malnutrition   [ ] Unspecified Protein Calorie Malnutrition   [ ] Underweight / BMI <19  [ ] Morbid Obesity / BMI >40    Findings as based on:  •  Comprehensive nutritional assessment and consultation    Please refer to Initial Dietitian Evaluation via documents section of [facility] EMR for further recommendations.  agree with above  KL

## 2019-09-23 NOTE — PROGRESS NOTE ADULT - PROBLEM SELECTOR PLAN 5
cont lantus  8  FS improved  will keep this dose for now  cont to monitor FS, if consistently high will adjust lantus back up  will cont to monitor and encourage PO intake

## 2019-09-24 VITALS
RESPIRATION RATE: 17 BRPM | DIASTOLIC BLOOD PRESSURE: 63 MMHG | TEMPERATURE: 98 F | OXYGEN SATURATION: 100 % | SYSTOLIC BLOOD PRESSURE: 116 MMHG | HEART RATE: 87 BPM

## 2019-09-24 LAB
ANION GAP SERPL CALC-SCNC: 14 MMO/L — SIGNIFICANT CHANGE UP (ref 7–14)
BUN SERPL-MCNC: 28 MG/DL — HIGH (ref 7–23)
CALCIUM SERPL-MCNC: 8.7 MG/DL — SIGNIFICANT CHANGE UP (ref 8.4–10.5)
CHLORIDE SERPL-SCNC: 108 MMOL/L — HIGH (ref 98–107)
CO2 SERPL-SCNC: 20 MMOL/L — LOW (ref 22–31)
CREAT SERPL-MCNC: 1.16 MG/DL — SIGNIFICANT CHANGE UP (ref 0.5–1.3)
GLUCOSE BLDC GLUCOMTR-MCNC: 149 MG/DL — HIGH (ref 70–99)
GLUCOSE BLDC GLUCOMTR-MCNC: 150 MG/DL — HIGH (ref 70–99)
GLUCOSE BLDC GLUCOMTR-MCNC: 192 MG/DL — HIGH (ref 70–99)
GLUCOSE BLDC GLUCOMTR-MCNC: 201 MG/DL — HIGH (ref 70–99)
GLUCOSE BLDC GLUCOMTR-MCNC: 206 MG/DL — HIGH (ref 70–99)
GLUCOSE SERPL-MCNC: 136 MG/DL — HIGH (ref 70–99)
HCT VFR BLD CALC: 25.6 % — LOW (ref 39–50)
HGB BLD-MCNC: 8.2 G/DL — LOW (ref 13–17)
MAGNESIUM SERPL-MCNC: 1.7 MG/DL — SIGNIFICANT CHANGE UP (ref 1.6–2.6)
MCHC RBC-ENTMCNC: 30 PG — SIGNIFICANT CHANGE UP (ref 27–34)
MCHC RBC-ENTMCNC: 32 % — SIGNIFICANT CHANGE UP (ref 32–36)
MCV RBC AUTO: 93.8 FL — SIGNIFICANT CHANGE UP (ref 80–100)
NRBC # FLD: 0 K/UL — SIGNIFICANT CHANGE UP (ref 0–0)
PLATELET # BLD AUTO: 124 K/UL — LOW (ref 150–400)
PMV BLD: 10.3 FL — SIGNIFICANT CHANGE UP (ref 7–13)
POTASSIUM SERPL-MCNC: 3.8 MMOL/L — SIGNIFICANT CHANGE UP (ref 3.5–5.3)
POTASSIUM SERPL-SCNC: 3.8 MMOL/L — SIGNIFICANT CHANGE UP (ref 3.5–5.3)
RBC # BLD: 2.73 M/UL — LOW (ref 4.2–5.8)
RBC # FLD: 14.6 % — HIGH (ref 10.3–14.5)
SODIUM SERPL-SCNC: 142 MMOL/L — SIGNIFICANT CHANGE UP (ref 135–145)
WBC # BLD: 5.58 K/UL — SIGNIFICANT CHANGE UP (ref 3.8–10.5)
WBC # FLD AUTO: 5.58 K/UL — SIGNIFICANT CHANGE UP (ref 3.8–10.5)

## 2019-09-24 PROCEDURE — 99233 SBSQ HOSP IP/OBS HIGH 50: CPT

## 2019-09-24 PROCEDURE — 74019 RADEX ABDOMEN 2 VIEWS: CPT | Mod: 26

## 2019-09-24 RX ORDER — MAGNESIUM OXIDE 400 MG ORAL TABLET 241.3 MG
400 TABLET ORAL
Refills: 0 | Status: DISCONTINUED | OUTPATIENT
Start: 2019-09-24 | End: 2019-09-24

## 2019-09-24 RX ORDER — ONDANSETRON 8 MG/1
4 TABLET, FILM COATED ORAL EVERY 6 HOURS
Refills: 0 | Status: DISCONTINUED | OUTPATIENT
Start: 2019-09-24 | End: 2019-09-24

## 2019-09-24 RX ORDER — POTASSIUM CHLORIDE 20 MEQ
40 PACKET (EA) ORAL ONCE
Refills: 0 | Status: COMPLETED | OUTPATIENT
Start: 2019-09-24 | End: 2019-09-24

## 2019-09-24 RX ORDER — DOCUSATE SODIUM 100 MG
100 CAPSULE ORAL THREE TIMES A DAY
Refills: 0 | Status: DISCONTINUED | OUTPATIENT
Start: 2019-09-24 | End: 2019-09-24

## 2019-09-24 RX ORDER — POLYETHYLENE GLYCOL 3350 17 G/17G
17 POWDER, FOR SOLUTION ORAL
Refills: 0 | Status: DISCONTINUED | OUTPATIENT
Start: 2019-09-24 | End: 2019-09-24

## 2019-09-24 RX ADMIN — INSULIN GLARGINE 8 UNIT(S): 100 INJECTION, SOLUTION SUBCUTANEOUS at 21:50

## 2019-09-24 RX ADMIN — SENNA PLUS 2 TABLET(S): 8.6 TABLET ORAL at 21:50

## 2019-09-24 RX ADMIN — SODIUM CHLORIDE 75 MILLILITER(S): 9 INJECTION INTRAMUSCULAR; INTRAVENOUS; SUBCUTANEOUS at 17:55

## 2019-09-24 RX ADMIN — FLUDROCORTISONE ACETATE 0.1 MILLIGRAM(S): 0.1 TABLET ORAL at 05:33

## 2019-09-24 RX ADMIN — Medication 975 MILLIGRAM(S): at 11:44

## 2019-09-24 RX ADMIN — Medication 40 MILLIEQUIVALENT(S): at 17:55

## 2019-09-24 RX ADMIN — Medication 4: at 12:26

## 2019-09-24 RX ADMIN — ONDANSETRON 4 MILLIGRAM(S): 8 TABLET, FILM COATED ORAL at 12:33

## 2019-09-24 RX ADMIN — MAGNESIUM OXIDE 400 MG ORAL TABLET 400 MILLIGRAM(S): 241.3 TABLET ORAL at 17:56

## 2019-09-24 RX ADMIN — Medication 100 MILLIGRAM(S): at 21:50

## 2019-09-24 RX ADMIN — Medication 975 MILLIGRAM(S): at 12:15

## 2019-09-24 RX ADMIN — Medication 25 MILLIGRAM(S): at 05:33

## 2019-09-24 RX ADMIN — CLOPIDOGREL BISULFATE 75 MILLIGRAM(S): 75 TABLET, FILM COATED ORAL at 09:32

## 2019-09-24 RX ADMIN — SODIUM CHLORIDE 75 MILLILITER(S): 9 INJECTION INTRAMUSCULAR; INTRAVENOUS; SUBCUTANEOUS at 05:32

## 2019-09-24 RX ADMIN — Medication 325 MILLIGRAM(S): at 09:32

## 2019-09-24 NOTE — PROGRESS NOTE ADULT - PROBLEM SELECTOR PROBLEM 1
Closed fracture of proximal end of right humerus, unspecified fracture morphology, initial encounter
Syncope, unspecified syncope type
Closed fracture of proximal end of right humerus, unspecified fracture morphology, initial encounter
Syncope, unspecified syncope type

## 2019-09-24 NOTE — CHART NOTE - NSCHARTNOTEFT_GEN_A_CORE
Telemetry reviewed AFib at 70bpm with occasional PVC's.  No NSVT or bradycardia seen.  Will follow up for possible Loop recorder prior to discharge.

## 2019-09-24 NOTE — PROGRESS NOTE ADULT - PROBLEM SELECTOR PLAN 2
ortho recs appreciated : right proximal humerus fracture  Pain control with tylenol with codeine  NWB R UE in sling  encourage active finger motion  no acute orthopedic intervention  Active movement of fingers/wrist/elbow encouraged  Follow up with Dr. Person within 2 weeks, call office for appointment [phone]  no contraindication to ENT surgery if patient is to be in standard supine position, do not manipulate right arm  PT very particular about having multiple people to get him out of bed; very reluctant to participate

## 2019-09-24 NOTE — PROGRESS NOTE ADULT - PROBLEM SELECTOR PLAN 3
SCC of the right neck area, s/p 35 rounds of RT and 7 rounds of chemo.  scheduled for resection and bx of remaining lymph nodes for this Thursday with Dr. Portillo  spoke to Dr Portillo, will postpone surgery till later date until after workup, if still admitted will come to see pt on wed/thurs  no contraindication per ortho

## 2019-09-24 NOTE — PROGRESS NOTE ADULT - REASON FOR ADMISSION
Responded to Code Blue
dizziness

## 2019-09-24 NOTE — PROGRESS NOTE ADULT - SUBJECTIVE AND OBJECTIVE BOX
Patient is a 79y old  Male who presents with a chief complaint of dizziness       SUBJECTIVE / OVERNIGHT EVENTS: still with poor PO; pt and wife cont to ask about the dizziness; unsure if they are truly understanding; now pt c/o nausea and no BM for last few days but no abd pain      MEDICATIONS  (STANDING):  aspirin enteric coated 325 milliGRAM(s) Oral daily  clopidogrel Tablet 75 milliGRAM(s) Oral daily  dextrose 5%. 1000 milliLiter(s) (50 mL/Hr) IV Continuous <Continuous>  dextrose 50% Injectable 12.5 Gram(s) IV Push once  dextrose 50% Injectable 25 Gram(s) IV Push once  dextrose 50% Injectable 25 Gram(s) IV Push once  fludroCORTISONE 0.1 milliGRAM(s) Oral daily  influenza   Vaccine 0.5 milliLiter(s) IntraMuscular once  insulin glargine Injectable (LANTUS) 8 Unit(s) SubCutaneous at bedtime  insulin lispro (HumaLOG) corrective regimen sliding scale   SubCutaneous three times a day before meals  metoprolol succinate ER 25 milliGRAM(s) Oral daily  senna 2 Tablet(s) Oral at bedtime  sodium chloride 0.9%. 1000 milliLiter(s) (75 mL/Hr) IV Continuous <Continuous>    MEDICATIONS  (PRN):  acetaminophen   Tablet .. 975 milliGRAM(s) Oral every 6 hours PRN Mild Pain (1 - 3), Moderate Pain (4 - 6)  acetaminophen 300 mG/codeine 30 mG 1 Tablet(s) Oral every 4 hours PRN mod-sever pain  dextrose 40% Gel 15 Gram(s) Oral once PRN Blood Glucose LESS THAN 70 milliGRAM(s)/deciliter  glucagon  Injectable 1 milliGRAM(s) IntraMuscular once PRN Glucose LESS THAN 70 milligrams/deciliter  ondansetron   Disintegrating Tablet 4 milliGRAM(s) Oral every 6 hours PRN Nausea and/or Vomiting  polyethylene glycol 3350 17 Gram(s) Oral two times a day PRN Constipation      Vital Signs Last 24 Hrs  T(F): 98.1 (24 Sep 2019 13:00), Max: 98.5 (23 Sep 2019 22:36)  HR: 91 (24 Sep 2019 13:00) (72 - 91)  BP: 113/69 (24 Sep 2019 13:00) (100/58 - 113/69)  RR: 18 (24 Sep 2019 13:00) (16 - 18)  SpO2: 100% (24 Sep 2019 13:00) (97% - 100%)        CAPILLARY BLOOD GLUCOSE  POCT Blood Glucose.: 206 mg/dL (24 Sep 2019 12:10)  POCT Blood Glucose.: 149 mg/dL (24 Sep 2019 08:31)  POCT Blood Glucose.: 146 mg/dL (23 Sep 2019 21:53)  POCT Blood Glucose.: 158 mg/dL (23 Sep 2019 17:14)            PHYSICAL EXAM  GENERAL: NAD, well-developed  EYES: EOMI, PERRLA, conjunctiva and sclera clear  CHEST/LUNG: Clear to auscultation bilaterally;   HEART: Regular rate and rhythm;   ABDOMEN: Soft, Nontender, Nondistended; Bowel sounds present  EXTREMITIES:  No clubbing, cyanosis, or edema      LABS:                        8.2    5.58  )-----------( 124      ( 24 Sep 2019 07:00 )             25.6     09-24    142  |  108<H>  |  28<H>  ----------------------------<  136<H>  3.8   |  20<L>  |  1.16    Ca    8.7      24 Sep 2019 07:00  Mg     1.7     09-24

## 2019-09-24 NOTE — PROGRESS NOTE ADULT - PROBLEM SELECTOR PROBLEM 5
Diabetes Mellitus

## 2019-09-24 NOTE — PROGRESS NOTE ADULT - PROBLEM SELECTOR PLAN 7
poor po intake  glucerna, ensure pudding added  nutrition consult for protein options

## 2019-09-24 NOTE — PROGRESS NOTE ADULT - PROBLEM SELECTOR PROBLEM 3
Malignant neoplasm of tongue

## 2019-09-24 NOTE — CHART NOTE - NSCHARTNOTEFT_GEN_A_CORE
Patient is an 79 y M with PMH CAD s/p CABG and AVR, Afib not on AC, IDDM2, SCC of the right neck dxed in 12/2018 s/p 35 rounds of RT and 7 rounds of chemo presents on 9/15 with right shoulder pain after sustaining a fall secondary to dizziness and found to have a right shoulder fracture. RRT called and subsequently upgraded to a code blue. Upon entering the room, compressions in progress, and patient being bagged for ventilation. On first pulse check patient found to be in asystole. Patient was given a dose of Epi and was subsequently intubated by anesthesia. On the third pulse check, patients rhythm converted to VFib. Patient was shocked at that time and briefly had ROSC, however he again became pulseless within 10 seconds and compressions were again resumed. Patient continued to be in VFib And was given a total of 450mg IV Amio and 200mg IV Lidocaine and was shocked a total of 6 times. Patient continued to be in VFib following the 8th pulse check and the patient was given a dose of Calcium Chloride and Sodium Bicarb. Patients rhythm at that time was found to be asystole again. At that time, patients pupils fixed and dilated, and no spontaneous breathing. No palpable carotid or radial pulse. No heart or breath sounds. Time of death 23:22. Family notified.      Boston Smallwood, PGY3

## 2019-09-24 NOTE — PROGRESS NOTE ADULT - PROBLEM SELECTOR PROBLEM 2
Closed fracture of proximal end of right humerus, unspecified fracture morphology, initial encounter
Dizziness
Closed fracture of proximal end of right humerus, unspecified fracture morphology, initial encounter
Dizziness

## 2019-09-24 NOTE — PROGRESS NOTE ADULT - SUBJECTIVE AND OBJECTIVE BOX
Responded to code blue. CPR ongoing. Respiratory Therapist at bedside. Patient is intubated. ETT size 7.0. +ETCO2.  CPR ongoing. Call to primary team. Call to Respiratory Therapist.

## 2019-09-25 NOTE — DISCHARGE NOTE FOR THE EXPIRED PATIENT - HOSPITAL COURSE
79 y M with PMH CAD s/p CABG and AVR, Afib not on AC, IDDM2, SCC of the right neck dxed in 12/2018 s/p 35 rounds of RT and 7 rounds of chemo presents on 9/15 with right shoulder pain after sustaining a fall secondary to dizziness and found to have a right shoulder fracture.   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  Etiology of dizziness, likely secondary to : Pre-syncope secondary to orthostatics versus posterior circulation TIA/small infarct from vertebro basilar insufficiency.            Problem/Plan - 1:  ·  Problem: Syncope, unspecified syncope type.  Plan: likely due to orthostatic hypotension  pt with poor PO intake, very dry  still orthostatic, cont IVF; liberalize diet  MRI negative for acute pathology.      Problem/Plan - 2:  ·  Problem: Closed fracture of proximal end of right humerus, unspecified fracture morphology, initial encounter.  Plan: ortho recs appreciated : right proximal humerus fracture  Pain control with tylenol with codeine  NWB R UE in sling  encourage active finger motion  no acute orthopedic intervention  Active movement of fingers/wrist/elbow encouraged  Follow up with Dr. Person within 2 weeks, call office for appointment [phone]  no contraindication to ENT surgery if patient is to be in standard supine position, do not manipulate right arm  PT very particular about having multiple people to get him out of bed; very reluctant to participate.    Problem/Plan - 3:  ·  Problem: Malignant neoplasm of tongue.  Plan: SCC of the right neck area, s/p 35 rounds of RT and 7 rounds of chemo.  scheduled for resection and bx of remaining lymph nodes for this Thursday with Dr. Portillo  spoke to Dr Portilol, will postpone surgery till later date until after workup, if still admitted will come to see pt on wed/thurs  no contraindication per ortho.      Problem/Plan - 4:  ·  Problem: Chronic atrial fibrillation.  Plan: c/w with home toprolol 25 mg daily  on ASA per pt wishes  Stress test: The left ventricle was normal in size. There are large, moderate defects in inferior, inferoseptal, and inferolateral walls that are fixed, suggestive of infarct with small area of perinfarct and 51% LVEF.    Problem/Plan - 5:  ·  Problem: Diabetes Mellitus.  Plan: cont lantus  8  FS improved  will keep this dose for now  cont to monitor FS, if consistently high will adjust lantus back up  will cont to monitor and encourage PO intake.      Problem/Plan - 6:  Problem: Prophylactic measure. Plan: DVT: SCD due to thrombocytopenia  Diet: consistent carbs  Dispo: rehab.   Problem/Plan - 7:  ·  Problem: Moderate protein-calorie malnutrition.  Plan: poor po intake  glucerna, ensure pudding added  nutrition consult for protein options.     9/24/19- PA Note:  Telemetry monitor showed pt's rhythm was Sinus Bradycardia @ 35 bpm followed by Asystole. Patient became unresponsive  RRT called and subsequently upgraded to a code blue. Upon entering the room, compressions in progress, and patient being bagged for ventilation. On first pulse check patient found to be in asystole. Patient was given a dose of Epi and was subsequently intubated by anesthesia. On the third pulse check, patients rhythm converted to VFib. Patient was shocked at that time and briefly had ROSC, however he again became pulseless within 10 seconds and compressions were again resumed. Patient continued to be in VFib And was given a total of 450mg IV Amio and 200mg IV Lidocaine and was shocked a total of 6 times. Patient continued to be in VFib following the 8th pulse check and the patient was given a dose of Calcium Chloride and Sodium Bicarb. Patients rhythm at that time was found to be asystole again. At that time, patients pupils fixed and dilated, and no spontaneous breathing. No palpable carotid or radial pulse. No heart or breath sounds. Time of death 23:22. Family notified.  I spoke with pt's wife she declined autopsy and organ donation.

## 2019-09-25 NOTE — DISCHARGE NOTE FOR THE EXPIRED PATIENT - OTHER SIGNIFICANT FINDINGS
(Admit Diagnosis) Closed fracture of proximal end of right humerus  (PMH) Malignant neoplasm of tongue  (PMH) Afib  (PMH) Pleural Effusion  (PMH) Viral Meningitis  (PMH) Diabetes Mellitus  (PMH) Gallstones  (Principal DC/DX) Closed fracture of proximal end of right humerus, unspecified fracture morphology, initial encounter  (Problem/DX) Moderate protein-calorie malnutrition  (Problem/DX) Syncope, unspecified syncope type  (Problem/DX) Prophylactic measure  (Problem/DX) Diabetes Mellitus  (Problem/DX) Chronic atrial fibrillation  (Problem/DX) Malignant neoplasm of tongue  (Problem/DX) Dizziness  (Problem/DX) Closed fracture of proximal end of right humerus, unspecified fracture morphology, initial encounter  (PSH) History of bronchoscopy  (PSH) History of back surgery  (PSH) H/O peripheral artery bypass  (PSH) H/O aortic valve replacement  (PSH) History of toe surgery  (PSH) Cataract  (PSH) S/P Laparoscopic Cholecystectomy  (PSH) Aortocoronary Artery Bypass Graft (ICD9 V45.81)  (PSH) History of Aortic Valve Replacement (ICD9 V43.3)  (PSH) History of Appendectomy  (PSH) S/P Tonsillectomy

## 2019-11-21 ENCOUNTER — APPOINTMENT (OUTPATIENT)
Dept: RADIATION ONCOLOGY | Facility: CLINIC | Age: 79
End: 2019-11-21

## 2020-07-28 NOTE — DISEASE MANAGEMENT
[Clinical] : TNM Stage: c [I] : I [NTNM] : 1 [TTNM] : 1 [de-identified] : 7000 cGy [MTNM] : 0 [de-identified] : base of tongue none

## 2020-11-02 NOTE — PHYSICAL EXAM
Problem: Altered Mood, Depressive Behavior:  Goal: Able to verbalize and/or display a decrease in depressive symptoms  Description: Able to verbalize and/or display a decrease in depressive symptoms  11/2/2020 0822 by Silvio Pierce  Outcome: Ongoing   Patient denies depression at this time, states he is feeling better and ready to be discharged  Problem: Altered Mood, Depressive Behavior:  Goal: Ability to disclose and discuss suicidal ideas will improve  Description: Ability to disclose and discuss suicidal ideas will improve  11/2/2020 0822 by Silvio Pierce  Outcome: Ongoing   Patient denies SI at this time  Problem: Altered Mood, Depressive Behavior:  Goal: Absence of self-harm  Description: Absence of self-harm  11/2/2020 5161 by Silvio Pierce  Outcome: Ongoing   Patient remains free from self at this time  Problem: Substance Abuse:  Goal: Absence of drug withdrawal signs and symptoms  Description: Absence of drug/alcohol withdrawal signs and symptoms  11/2/2020 0822 by Silvio Pierce  Outcome: Ongoing   Patient is currently in withdrawal, fluids given frequently.  Q 15 minute checks [Restricted in physically strenuous activity but ambulatory and able to carry out work of a light or sedentary nature] : Status 1- Restricted in physically strenuous activity but ambulatory and able to carry out work of a light or sedentary nature, e.g., light house work, office work [Normal] : affect appropriate [Ulcers] : no ulcers [Mucositis] : no mucositis [Thrush] : no thrush [de-identified] : elderly  [de-identified] : bilateral cataracts [de-identified] : right sub mental mass level 1 ; 2 cm X 2 cm [de-identified] : right 4 th toe amputation [de-identified] : seborrhea keratosis over scalp back,chest  [de-identified] : forgetful

## 2022-03-04 NOTE — H&P PST ADULT - MALLAMPATI CLASS
<<-----Click here for Discharge Medication Review
Class III - visualization of the soft palate and the base of the uvula

## 2023-03-02 NOTE — DISCHARGE NOTE ADULT - MEDICATION SUMMARY - MEDICATIONS TO STOP TAKING
[Initial Evaluation] : an initial evaluation [FreeTextEntry1] : joint pain I will STOP taking the medications listed below when I get home from the hospital:  None
